# Patient Record
Sex: FEMALE | Race: ASIAN | NOT HISPANIC OR LATINO | URBAN - METROPOLITAN AREA
[De-identification: names, ages, dates, MRNs, and addresses within clinical notes are randomized per-mention and may not be internally consistent; named-entity substitution may affect disease eponyms.]

---

## 2022-01-07 ENCOUNTER — INPATIENT (INPATIENT)
Facility: HOSPITAL | Age: 22
LOS: 2 days | Discharge: HOME | DRG: 637 | End: 2022-01-10
Attending: INTERNAL MEDICINE | Admitting: INTERNAL MEDICINE
Payer: COMMERCIAL

## 2022-01-07 VITALS
OXYGEN SATURATION: 100 % | HEART RATE: 114 BPM | DIASTOLIC BLOOD PRESSURE: 81 MMHG | RESPIRATION RATE: 24 BRPM | SYSTOLIC BLOOD PRESSURE: 120 MMHG | WEIGHT: 110.01 LBS

## 2022-01-07 LAB
A1C WITH ESTIMATED AVERAGE GLUCOSE RESULT: >20.1 % — HIGH (ref 4–5.6)
ALBUMIN SERPL ELPH-MCNC: 2.9 G/DL — LOW (ref 3.4–5)
ALBUMIN SERPL ELPH-MCNC: 3.6 G/DL — SIGNIFICANT CHANGE UP (ref 3.3–5)
ALBUMIN SERPL ELPH-MCNC: 3.6 G/DL — SIGNIFICANT CHANGE UP (ref 3.4–5)
ALBUMIN SERPL ELPH-MCNC: 3.8 G/DL — SIGNIFICANT CHANGE UP (ref 3.3–5)
ALP SERPL-CCNC: 289 U/L — HIGH (ref 40–120)
ALP SERPL-CCNC: 418 U/L — HIGH (ref 40–120)
ALP SERPL-CCNC: 444 U/L — HIGH (ref 40–120)
ALP SERPL-CCNC: 487 U/L — HIGH (ref 40–120)
ALT FLD-CCNC: 24 U/L — SIGNIFICANT CHANGE UP (ref 10–45)
ALT FLD-CCNC: 25 U/L — SIGNIFICANT CHANGE UP (ref 12–42)
ALT FLD-CCNC: <5 U/L — LOW (ref 10–45)
ALT FLD-CCNC: SIGNIFICANT CHANGE UP U/L (ref 12–42)
AMPHET UR-MCNC: NEGATIVE — SIGNIFICANT CHANGE UP
ANION GAP SERPL CALC-SCNC: 21 MMOL/L — HIGH (ref 5–17)
ANION GAP SERPL CALC-SCNC: 23 MMOL/L — HIGH (ref 5–17)
ANION GAP SERPL CALC-SCNC: 26 MMOL/L — HIGH (ref 5–17)
ANION GAP SERPL CALC-SCNC: 29 MMOL/L — HIGH (ref 5–17)
ANION GAP SERPL CALC-SCNC: >21 MMOL/L — HIGH (ref 5–17)
ANION GAP SERPL CALC-SCNC: >24 MMOL/L — HIGH (ref 9–16)
ANION GAP SERPL CALC-SCNC: >26 MMOL/L — HIGH (ref 9–16)
ANISOCYTOSIS BLD QL: SIGNIFICANT CHANGE UP
APPEARANCE UR: CLEAR — SIGNIFICANT CHANGE UP
APTT BLD: 29.9 SEC — SIGNIFICANT CHANGE UP (ref 27.5–35.5)
APTT BLD: 33.1 SEC — SIGNIFICANT CHANGE UP (ref 27.5–35.5)
AST SERPL-CCNC: 29 U/L — SIGNIFICANT CHANGE UP (ref 15–37)
AST SERPL-CCNC: 36 U/L — SIGNIFICANT CHANGE UP (ref 10–40)
AST SERPL-CCNC: <5 U/L — LOW (ref 10–40)
AST SERPL-CCNC: SIGNIFICANT CHANGE UP U/L (ref 15–37)
B-OH-BUTYR SERPL-SCNC: >8 MMOL/L — SIGNIFICANT CHANGE UP
BACTERIA # UR AUTO: PRESENT /HPF
BARBITURATES UR SCN-MCNC: NEGATIVE — SIGNIFICANT CHANGE UP
BASE EXCESS BLDA CALC-SCNC: -24 MMOL/L — LOW (ref -2–3)
BASE EXCESS BLDA CALC-SCNC: -30.8 MMOL/L — LOW (ref -2–3)
BASOPHILS # BLD AUTO: 0.07 K/UL — SIGNIFICANT CHANGE UP (ref 0–0.2)
BASOPHILS # BLD AUTO: 0.12 K/UL — SIGNIFICANT CHANGE UP (ref 0–0.2)
BASOPHILS NFR BLD AUTO: 0.6 % — SIGNIFICANT CHANGE UP (ref 0–2)
BASOPHILS NFR BLD AUTO: 1 % — SIGNIFICANT CHANGE UP (ref 0–2)
BENZODIAZ UR-MCNC: NEGATIVE — SIGNIFICANT CHANGE UP
BILIRUB SERPL-MCNC: 0.2 MG/DL — SIGNIFICANT CHANGE UP (ref 0.2–1.2)
BILIRUB SERPL-MCNC: 0.3 MG/DL — SIGNIFICANT CHANGE UP (ref 0.2–1.2)
BILIRUB SERPL-MCNC: 0.4 MG/DL — SIGNIFICANT CHANGE UP (ref 0.2–1.2)
BILIRUB SERPL-MCNC: 0.5 MG/DL — SIGNIFICANT CHANGE UP (ref 0.2–1.2)
BILIRUB UR-MCNC: NEGATIVE — SIGNIFICANT CHANGE UP
BLD GP AB SCN SERPL QL: NEGATIVE — SIGNIFICANT CHANGE UP
BUN SERPL-MCNC: 11 MG/DL — SIGNIFICANT CHANGE UP (ref 7–23)
BUN SERPL-MCNC: 14 MG/DL — SIGNIFICANT CHANGE UP (ref 7–23)
BUN SERPL-MCNC: 15 MG/DL — SIGNIFICANT CHANGE UP (ref 7–23)
BUN SERPL-MCNC: 18 MG/DL — SIGNIFICANT CHANGE UP (ref 7–23)
BUN SERPL-MCNC: 19 MG/DL — SIGNIFICANT CHANGE UP (ref 7–23)
CALCIUM SERPL-MCNC: 5.9 MG/DL — CRITICAL LOW (ref 8.4–10.5)
CALCIUM SERPL-MCNC: 6 MG/DL — CRITICAL LOW (ref 8.4–10.5)
CALCIUM SERPL-MCNC: 6.3 MG/DL — CRITICAL LOW (ref 8.5–10.5)
CALCIUM SERPL-MCNC: 6.5 MG/DL — CRITICAL LOW (ref 8.4–10.5)
CALCIUM SERPL-MCNC: 7.3 MG/DL — LOW (ref 8.5–10.5)
CALCIUM SERPL-MCNC: 7.6 MG/DL — LOW (ref 8.4–10.5)
CALCIUM SERPL-MCNC: 7.7 MG/DL — LOW (ref 8.4–10.5)
CHLORIDE SERPL-SCNC: 110 MMOL/L — HIGH (ref 96–108)
CHLORIDE SERPL-SCNC: 113 MMOL/L — HIGH (ref 96–108)
CHLORIDE SERPL-SCNC: 115 MMOL/L — HIGH (ref 96–108)
CHLORIDE SERPL-SCNC: 117 MMOL/L — HIGH (ref 96–108)
CHLORIDE SERPL-SCNC: 99 MMOL/L — SIGNIFICANT CHANGE UP (ref 96–108)
CHOLEST SERPL-MCNC: 346 MG/DL — HIGH
CO2 BLDA-SCNC: 3 MMOL/L — LOW (ref 19–24)
CO2 BLDA-SCNC: 4 MMOL/L — LOW (ref 19–24)
CO2 SERPL-SCNC: 3 MMOL/L — CRITICAL LOW (ref 22–31)
CO2 SERPL-SCNC: 3 MMOL/L — CRITICAL LOW (ref 22–31)
CO2 SERPL-SCNC: 4 MMOL/L — CRITICAL LOW (ref 22–31)
CO2 SERPL-SCNC: 8 MMOL/L — CRITICAL LOW (ref 22–31)
CO2 SERPL-SCNC: <2 MMOL/L — CRITICAL LOW (ref 22–31)
CO2 SERPL-SCNC: <6 MMOL/L — CRITICAL LOW (ref 22–31)
CO2 SERPL-SCNC: <6 MMOL/L — CRITICAL LOW (ref 22–31)
COCAINE METAB.OTHER UR-MCNC: NEGATIVE — SIGNIFICANT CHANGE UP
COLOR SPEC: YELLOW — SIGNIFICANT CHANGE UP
CORTIS AM PEAK SERPL-MCNC: 37.17 UG/DL — HIGH (ref 6.02–18.4)
CREAT SERPL-MCNC: 0.48 MG/DL — LOW (ref 0.5–1.3)
CREAT SERPL-MCNC: 0.57 MG/DL — SIGNIFICANT CHANGE UP (ref 0.5–1.3)
CREAT SERPL-MCNC: 0.59 MG/DL — SIGNIFICANT CHANGE UP (ref 0.5–1.3)
CREAT SERPL-MCNC: 0.67 MG/DL — SIGNIFICANT CHANGE UP (ref 0.5–1.3)
CREAT SERPL-MCNC: 0.77 MG/DL — SIGNIFICANT CHANGE UP (ref 0.5–1.3)
CREAT SERPL-MCNC: 0.92 MG/DL — SIGNIFICANT CHANGE UP (ref 0.5–1.3)
CREAT SERPL-MCNC: 1.12 MG/DL — SIGNIFICANT CHANGE UP (ref 0.5–1.3)
DACRYOCYTES BLD QL SMEAR: SIGNIFICANT CHANGE UP
DIFF PNL FLD: ABNORMAL
EOSINOPHIL # BLD AUTO: 0 K/UL — SIGNIFICANT CHANGE UP (ref 0–0.5)
EOSINOPHIL # BLD AUTO: 0.12 K/UL — SIGNIFICANT CHANGE UP (ref 0–0.5)
EOSINOPHIL NFR BLD AUTO: 0 % — SIGNIFICANT CHANGE UP (ref 0–6)
EOSINOPHIL NFR BLD AUTO: 1 % — SIGNIFICANT CHANGE UP (ref 0–6)
EPI CELLS # UR: SIGNIFICANT CHANGE UP /HPF (ref 0–5)
ESTIMATED AVERAGE GLUCOSE: >441 MG/DL — HIGH (ref 68–114)
ETHANOL SERPL-MCNC: <3 MG/DL — SIGNIFICANT CHANGE UP
GAS PNL BLDA: SIGNIFICANT CHANGE UP
GLUCOSE BLDC GLUCOMTR-MCNC: 221 MG/DL — HIGH (ref 70–99)
GLUCOSE BLDC GLUCOMTR-MCNC: 267 MG/DL — HIGH (ref 70–99)
GLUCOSE BLDC GLUCOMTR-MCNC: 297 MG/DL — HIGH (ref 70–99)
GLUCOSE BLDC GLUCOMTR-MCNC: 357 MG/DL — HIGH (ref 70–99)
GLUCOSE BLDC GLUCOMTR-MCNC: 362 MG/DL — HIGH (ref 70–99)
GLUCOSE BLDC GLUCOMTR-MCNC: 377 MG/DL — HIGH (ref 70–99)
GLUCOSE BLDC GLUCOMTR-MCNC: 377 MG/DL — HIGH (ref 70–99)
GLUCOSE BLDC GLUCOMTR-MCNC: 409 MG/DL — HIGH (ref 70–99)
GLUCOSE BLDC GLUCOMTR-MCNC: 412 MG/DL — HIGH (ref 70–99)
GLUCOSE BLDC GLUCOMTR-MCNC: 478 MG/DL — CRITICAL HIGH (ref 70–99)
GLUCOSE BLDC GLUCOMTR-MCNC: 492 MG/DL — CRITICAL HIGH (ref 70–99)
GLUCOSE BLDC GLUCOMTR-MCNC: >600 MG/DL — CRITICAL HIGH (ref 70–99)
GLUCOSE SERPL-MCNC: 398 MG/DL — HIGH (ref 70–99)
GLUCOSE SERPL-MCNC: 451 MG/DL — CRITICAL HIGH (ref 70–99)
GLUCOSE SERPL-MCNC: 479 MG/DL — CRITICAL HIGH (ref 70–99)
GLUCOSE SERPL-MCNC: 512 MG/DL — CRITICAL HIGH (ref 70–99)
GLUCOSE SERPL-MCNC: 513 MG/DL — CRITICAL HIGH (ref 70–99)
GLUCOSE SERPL-MCNC: 535 MG/DL — CRITICAL HIGH (ref 70–99)
GLUCOSE SERPL-MCNC: 733 MG/DL — CRITICAL HIGH (ref 70–99)
GLUCOSE UR QL: >=1000
GRAN CASTS # UR COMP ASSIST: ABNORMAL /LPF
HCG SERPL-ACNC: <1 MIU/ML — SIGNIFICANT CHANGE UP
HCO3 BLDA-SCNC: 2 MMOL/L — CRITICAL LOW (ref 21–28)
HCO3 BLDA-SCNC: 4 MMOL/L — CRITICAL LOW (ref 21–28)
HCT VFR BLD CALC: 42.3 % — SIGNIFICANT CHANGE UP (ref 34.5–45)
HCT VFR BLD CALC: 45.8 % — HIGH (ref 34.5–45)
HDLC SERPL-MCNC: 43 MG/DL — LOW
HGB BLD-MCNC: 13.9 G/DL — SIGNIFICANT CHANGE UP (ref 11.5–15.5)
HGB BLD-MCNC: 14.5 G/DL — SIGNIFICANT CHANGE UP (ref 11.5–15.5)
IMM GRANULOCYTES NFR BLD AUTO: 2.8 % — HIGH (ref 0–1.5)
INR BLD: 0.87 — LOW (ref 0.88–1.16)
INR BLD: 0.91 — SIGNIFICANT CHANGE UP (ref 0.88–1.16)
KETONES UR-MCNC: >=80 MG/DL
LACTATE SERPL-SCNC: 0.5 MMOL/L — SIGNIFICANT CHANGE UP (ref 0.4–2)
LACTATE SERPL-SCNC: 1.1 MMOL/L — SIGNIFICANT CHANGE UP (ref 0.5–2)
LDLC SERPL DIRECT ASSAY-MCNC: 159 MG/DL — HIGH
LEUKOCYTE ESTERASE UR-ACNC: NEGATIVE — SIGNIFICANT CHANGE UP
LIDOCAIN IGE QN: 102 U/L — HIGH (ref 7–60)
LIDOCAIN IGE QN: 220 U/L — SIGNIFICANT CHANGE UP (ref 73–393)
LIPID PNL WITH DIRECT LDL SERPL: SIGNIFICANT CHANGE UP MG/DL
LYMPHOCYTES # BLD AUTO: 1.5 K/UL — SIGNIFICANT CHANGE UP (ref 1–3.3)
LYMPHOCYTES # BLD AUTO: 12.9 % — LOW (ref 13–44)
LYMPHOCYTES # BLD AUTO: 18 % — SIGNIFICANT CHANGE UP (ref 13–44)
LYMPHOCYTES # BLD AUTO: 2.15 K/UL — SIGNIFICANT CHANGE UP (ref 1–3.3)
MACROCYTES BLD QL: SLIGHT — SIGNIFICANT CHANGE UP
MAGNESIUM SERPL-MCNC: 1.9 MG/DL — SIGNIFICANT CHANGE UP (ref 1.6–2.6)
MAGNESIUM SERPL-MCNC: 2.4 MG/DL — SIGNIFICANT CHANGE UP (ref 1.6–2.6)
MAGNESIUM SERPL-MCNC: 2.5 MG/DL — SIGNIFICANT CHANGE UP (ref 1.6–2.6)
MAGNESIUM SERPL-MCNC: 2.6 MG/DL — SIGNIFICANT CHANGE UP (ref 1.6–2.6)
MANUAL SMEAR VERIFICATION: SIGNIFICANT CHANGE UP
MCHC RBC-ENTMCNC: 28.8 PG — SIGNIFICANT CHANGE UP (ref 27–34)
MCHC RBC-ENTMCNC: 29.2 PG — SIGNIFICANT CHANGE UP (ref 27–34)
MCHC RBC-ENTMCNC: 31.7 GM/DL — LOW (ref 32–36)
MCHC RBC-ENTMCNC: 32.9 GM/DL — SIGNIFICANT CHANGE UP (ref 32–36)
MCV RBC AUTO: 87.6 FL — SIGNIFICANT CHANGE UP (ref 80–100)
MCV RBC AUTO: 92.2 FL — SIGNIFICANT CHANGE UP (ref 80–100)
METAMYELOCYTES # FLD: 3 % — HIGH (ref 0–0)
METHADONE UR-MCNC: NEGATIVE — SIGNIFICANT CHANGE UP
MONOCYTES # BLD AUTO: 0.6 K/UL — SIGNIFICANT CHANGE UP (ref 0–0.9)
MONOCYTES # BLD AUTO: 1.63 K/UL — HIGH (ref 0–0.9)
MONOCYTES NFR BLD AUTO: 14 % — SIGNIFICANT CHANGE UP (ref 2–14)
MONOCYTES NFR BLD AUTO: 5 % — SIGNIFICANT CHANGE UP (ref 2–14)
MYELOCYTES NFR BLD: 5 % — HIGH (ref 0–0)
NEUTROPHILS # BLD AUTO: 7.76 K/UL — HIGH (ref 1.8–7.4)
NEUTROPHILS # BLD AUTO: 8.08 K/UL — HIGH (ref 1.8–7.4)
NEUTROPHILS NFR BLD AUTO: 56 % — SIGNIFICANT CHANGE UP (ref 43–77)
NEUTROPHILS NFR BLD AUTO: 69.7 % — SIGNIFICANT CHANGE UP (ref 43–77)
NEUTS BAND # BLD: 9 % — HIGH (ref 0–8)
NITRITE UR-MCNC: NEGATIVE — SIGNIFICANT CHANGE UP
NON HDL CHOLESTEROL: 303 MG/DL — HIGH
NRBC # BLD: 0 /100 WBCS — SIGNIFICANT CHANGE UP (ref 0–0)
NRBC # BLD: 0 /100 — SIGNIFICANT CHANGE UP (ref 0–0)
NRBC # BLD: SIGNIFICANT CHANGE UP /100 WBCS (ref 0–0)
OPIATES UR-MCNC: NEGATIVE — SIGNIFICANT CHANGE UP
PCO2 BLDA: <18 MMHG — LOW (ref 32–35)
PCO2 BLDA: <18 MMHG — LOW (ref 32–35)
PCO2 BLDV: 25 MMHG — LOW (ref 39–42)
PCO2 BLDV: <19 MMHG — LOW (ref 39–42)
PCP SPEC-MCNC: SIGNIFICANT CHANGE UP
PCP UR-MCNC: NEGATIVE — SIGNIFICANT CHANGE UP
PH BLDA: 6.83 — CRITICAL LOW (ref 7.35–7.45)
PH BLDA: 7.09 — CRITICAL LOW (ref 7.35–7.45)
PH BLDV: <6.93 — LOW (ref 7.32–7.43)
PH BLDV: <6.93 — LOW (ref 7.32–7.43)
PH UR: 5.5 — SIGNIFICANT CHANGE UP (ref 5–8)
PHOSPHATE SERPL-MCNC: 0.5 MG/DL — CRITICAL LOW (ref 2.5–4.5)
PHOSPHATE SERPL-MCNC: 1.6 MG/DL — LOW (ref 2.5–4.5)
PHOSPHATE SERPL-MCNC: 2.7 MG/DL — SIGNIFICANT CHANGE UP (ref 2.5–4.5)
PHOSPHATE SERPL-MCNC: 2.8 MG/DL — SIGNIFICANT CHANGE UP (ref 2.5–4.5)
PLAT MORPH BLD: NORMAL — SIGNIFICANT CHANGE UP
PLATELET # BLD AUTO: 257 K/UL — SIGNIFICANT CHANGE UP (ref 150–400)
PLATELET # BLD AUTO: 304 K/UL — SIGNIFICANT CHANGE UP (ref 150–400)
PO2 BLDA: 175 MMHG — HIGH (ref 83–108)
PO2 BLDA: 197 MMHG — HIGH (ref 83–108)
PO2 BLDV: 48 MMHG — HIGH (ref 25–45)
PO2 BLDV: 74 MMHG — HIGH (ref 25–45)
POIKILOCYTOSIS BLD QL AUTO: SIGNIFICANT CHANGE UP
POLYCHROMASIA BLD QL SMEAR: SLIGHT — SIGNIFICANT CHANGE UP
POTASSIUM SERPL-MCNC: 2.7 MMOL/L — CRITICAL LOW (ref 3.5–5.3)
POTASSIUM SERPL-MCNC: 3.1 MMOL/L — LOW (ref 3.5–5.3)
POTASSIUM SERPL-MCNC: 3.4 MMOL/L — LOW (ref 3.5–5.3)
POTASSIUM SERPL-MCNC: 3.4 MMOL/L — LOW (ref 3.5–5.3)
POTASSIUM SERPL-MCNC: 4 MMOL/L — SIGNIFICANT CHANGE UP (ref 3.5–5.3)
POTASSIUM SERPL-MCNC: SIGNIFICANT CHANGE UP (ref 3.5–5.3)
POTASSIUM SERPL-MCNC: SIGNIFICANT CHANGE UP MMOL/L (ref 3.5–5.3)
POTASSIUM SERPL-SCNC: 2.7 MMOL/L — CRITICAL LOW (ref 3.5–5.3)
POTASSIUM SERPL-SCNC: 3.1 MMOL/L — LOW (ref 3.5–5.3)
POTASSIUM SERPL-SCNC: 3.4 MMOL/L — LOW (ref 3.5–5.3)
POTASSIUM SERPL-SCNC: 3.4 MMOL/L — LOW (ref 3.5–5.3)
POTASSIUM SERPL-SCNC: 4 MMOL/L — SIGNIFICANT CHANGE UP (ref 3.5–5.3)
POTASSIUM SERPL-SCNC: SIGNIFICANT CHANGE UP (ref 3.5–5.3)
POTASSIUM SERPL-SCNC: SIGNIFICANT CHANGE UP MMOL/L (ref 3.5–5.3)
PROMYELOCYTES # FLD: 2 % — HIGH (ref 0–0)
PROT SERPL-MCNC: 5.3 G/DL — LOW (ref 6.4–8.2)
PROT SERPL-MCNC: 5.5 G/DL — LOW (ref 6–8.3)
PROT SERPL-MCNC: 6 G/DL — SIGNIFICANT CHANGE UP (ref 6–8.3)
PROT SERPL-MCNC: 6.6 G/DL — SIGNIFICANT CHANGE UP (ref 6.4–8.2)
PROT UR-MCNC: 30 MG/DL
PROTHROM AB SERPL-ACNC: 10.5 SEC — LOW (ref 10.6–13.6)
PROTHROM AB SERPL-ACNC: 10.8 SEC — SIGNIFICANT CHANGE UP (ref 10.6–13.6)
RBC # BLD: 4.83 M/UL — SIGNIFICANT CHANGE UP (ref 3.8–5.2)
RBC # BLD: 4.97 M/UL — SIGNIFICANT CHANGE UP (ref 3.8–5.2)
RBC # FLD: 13.8 % — SIGNIFICANT CHANGE UP (ref 10.3–14.5)
RBC # FLD: 14.1 % — SIGNIFICANT CHANGE UP (ref 10.3–14.5)
RBC BLD AUTO: ABNORMAL
RBC CASTS # UR COMP ASSIST: < 5 /HPF — SIGNIFICANT CHANGE UP
RH IG SCN BLD-IMP: POSITIVE — SIGNIFICANT CHANGE UP
SAO2 % BLDA: 100 % — HIGH (ref 94–98)
SAO2 % BLDA: 99.6 % — HIGH (ref 94–98)
SAO2 % BLDV: 82.3 % — SIGNIFICANT CHANGE UP (ref 67–88)
SAO2 % BLDV: 94.7 % — HIGH (ref 67–88)
SARS-COV-2 RNA SPEC QL NAA+PROBE: SIGNIFICANT CHANGE UP
SODIUM SERPL-SCNC: 131 MMOL/L — LOW (ref 132–145)
SODIUM SERPL-SCNC: 136 MMOL/L — SIGNIFICANT CHANGE UP (ref 135–145)
SODIUM SERPL-SCNC: 140 MMOL/L — SIGNIFICANT CHANGE UP (ref 132–145)
SODIUM SERPL-SCNC: 142 MMOL/L — SIGNIFICANT CHANGE UP (ref 135–145)
SODIUM SERPL-SCNC: 144 MMOL/L — SIGNIFICANT CHANGE UP (ref 135–145)
SODIUM SERPL-SCNC: 146 MMOL/L — HIGH (ref 135–145)
SODIUM SERPL-SCNC: 147 MMOL/L — HIGH (ref 135–145)
SP GR SPEC: 1.02 — SIGNIFICANT CHANGE UP (ref 1–1.03)
SPHEROCYTES BLD QL SMEAR: SLIGHT — SIGNIFICANT CHANGE UP
THC UR QL: NEGATIVE — SIGNIFICANT CHANGE UP
TRIGL SERPL-MCNC: 1298 MG/DL — HIGH
TROPONIN I, HIGH SENSITIVITY RESULT: 9.9 NG/L — SIGNIFICANT CHANGE UP
TSH SERPL-MCNC: 2.07 UIU/ML — SIGNIFICANT CHANGE UP (ref 0.36–3.74)
UROBILINOGEN FLD QL: 0.2 E.U./DL — SIGNIFICANT CHANGE UP
WBC # BLD: 11.61 K/UL — HIGH (ref 3.8–10.5)
WBC # BLD: 11.94 K/UL — HIGH (ref 3.8–10.5)
WBC # FLD AUTO: 11.61 K/UL — HIGH (ref 3.8–10.5)
WBC # FLD AUTO: 11.94 K/UL — HIGH (ref 3.8–10.5)
WBC UR QL: < 5 /HPF — SIGNIFICANT CHANGE UP

## 2022-01-07 PROCEDURE — 71045 X-RAY EXAM CHEST 1 VIEW: CPT | Mod: 26

## 2022-01-07 PROCEDURE — 99222 1ST HOSP IP/OBS MODERATE 55: CPT | Mod: GC,25

## 2022-01-07 PROCEDURE — 36620 INSERTION CATHETER ARTERY: CPT | Mod: GC

## 2022-01-07 PROCEDURE — 70450 CT HEAD/BRAIN W/O DYE: CPT | Mod: 26

## 2022-01-07 PROCEDURE — 93010 ELECTROCARDIOGRAM REPORT: CPT

## 2022-01-07 PROCEDURE — 71045 X-RAY EXAM CHEST 1 VIEW: CPT | Mod: 26,59

## 2022-01-07 PROCEDURE — 99291 CRITICAL CARE FIRST HOUR: CPT | Mod: 25

## 2022-01-07 RX ORDER — POTASSIUM CHLORIDE 20 MEQ
40 PACKET (EA) ORAL EVERY 4 HOURS
Refills: 0 | Status: COMPLETED | OUTPATIENT
Start: 2022-01-07 | End: 2022-01-07

## 2022-01-07 RX ORDER — POTASSIUM CHLORIDE 20 MEQ
40 PACKET (EA) ORAL ONCE
Refills: 0 | Status: COMPLETED | OUTPATIENT
Start: 2022-01-07 | End: 2022-01-07

## 2022-01-07 RX ORDER — PIPERACILLIN AND TAZOBACTAM 4; .5 G/20ML; G/20ML
4.5 INJECTION, POWDER, LYOPHILIZED, FOR SOLUTION INTRAVENOUS EVERY 6 HOURS
Refills: 0 | Status: DISCONTINUED | OUTPATIENT
Start: 2022-01-07 | End: 2022-01-08

## 2022-01-07 RX ORDER — MAGNESIUM SULFATE 500 MG/ML
1 VIAL (ML) INJECTION ONCE
Refills: 0 | Status: COMPLETED | OUTPATIENT
Start: 2022-01-07 | End: 2022-01-07

## 2022-01-07 RX ORDER — POTASSIUM CHLORIDE 20 MEQ
10 PACKET (EA) ORAL
Refills: 0 | Status: COMPLETED | OUTPATIENT
Start: 2022-01-07 | End: 2022-01-07

## 2022-01-07 RX ORDER — DEXTROSE 50 % IN WATER 50 %
50 SYRINGE (ML) INTRAVENOUS
Refills: 0 | Status: DISCONTINUED | OUTPATIENT
Start: 2022-01-07 | End: 2022-01-10

## 2022-01-07 RX ORDER — SODIUM CHLORIDE 9 MG/ML
1000 INJECTION, SOLUTION INTRAVENOUS ONCE
Refills: 0 | Status: COMPLETED | OUTPATIENT
Start: 2022-01-07 | End: 2022-01-07

## 2022-01-07 RX ORDER — POTASSIUM CHLORIDE 20 MEQ
20 PACKET (EA) ORAL ONCE
Refills: 0 | Status: COMPLETED | OUTPATIENT
Start: 2022-01-07 | End: 2022-01-07

## 2022-01-07 RX ORDER — INSULIN HUMAN 100 [IU]/ML
10 INJECTION, SOLUTION SUBCUTANEOUS ONCE
Refills: 0 | Status: DISCONTINUED | OUTPATIENT
Start: 2022-01-07 | End: 2022-01-07

## 2022-01-07 RX ORDER — INSULIN HUMAN 100 [IU]/ML
4.5 INJECTION, SOLUTION SUBCUTANEOUS
Qty: 100 | Refills: 0 | Status: DISCONTINUED | OUTPATIENT
Start: 2022-01-07 | End: 2022-01-07

## 2022-01-07 RX ORDER — CHLORHEXIDINE GLUCONATE 213 G/1000ML
1 SOLUTION TOPICAL
Refills: 0 | Status: DISCONTINUED | OUTPATIENT
Start: 2022-01-07 | End: 2022-01-10

## 2022-01-07 RX ORDER — POTASSIUM PHOSPHATE, MONOBASIC POTASSIUM PHOSPHATE, DIBASIC 236; 224 MG/ML; MG/ML
15 INJECTION, SOLUTION INTRAVENOUS ONCE
Refills: 0 | Status: COMPLETED | OUTPATIENT
Start: 2022-01-07 | End: 2022-01-07

## 2022-01-07 RX ORDER — PIPERACILLIN AND TAZOBACTAM 4; .5 G/20ML; G/20ML
3.38 INJECTION, POWDER, LYOPHILIZED, FOR SOLUTION INTRAVENOUS ONCE
Refills: 0 | Status: DISCONTINUED | OUTPATIENT
Start: 2022-01-07 | End: 2022-01-07

## 2022-01-07 RX ORDER — INSULIN HUMAN 100 [IU]/ML
4 INJECTION, SOLUTION SUBCUTANEOUS ONCE
Refills: 0 | Status: DISCONTINUED | OUTPATIENT
Start: 2022-01-07 | End: 2022-01-07

## 2022-01-07 RX ORDER — HYDROCORTISONE 20 MG
100 TABLET ORAL EVERY 8 HOURS
Refills: 0 | Status: DISCONTINUED | OUTPATIENT
Start: 2022-01-07 | End: 2022-01-07

## 2022-01-07 RX ORDER — SODIUM BICARBONATE 1 MEQ/ML
50 SYRINGE (ML) INTRAVENOUS ONCE
Refills: 0 | Status: COMPLETED | OUTPATIENT
Start: 2022-01-07 | End: 2022-01-07

## 2022-01-07 RX ORDER — ENOXAPARIN SODIUM 100 MG/ML
40 INJECTION SUBCUTANEOUS EVERY 24 HOURS
Refills: 0 | Status: DISCONTINUED | OUTPATIENT
Start: 2022-01-07 | End: 2022-01-08

## 2022-01-07 RX ORDER — ONDANSETRON 8 MG/1
4 TABLET, FILM COATED ORAL ONCE
Refills: 0 | Status: COMPLETED | OUTPATIENT
Start: 2022-01-07 | End: 2022-01-07

## 2022-01-07 RX ORDER — INSULIN HUMAN 100 [IU]/ML
4 INJECTION, SOLUTION SUBCUTANEOUS
Qty: 100 | Refills: 0 | Status: DISCONTINUED | OUTPATIENT
Start: 2022-01-07 | End: 2022-01-08

## 2022-01-07 RX ORDER — SODIUM CHLORIDE 9 MG/ML
1000 INJECTION, SOLUTION INTRAVENOUS ONCE
Refills: 0 | Status: DISCONTINUED | OUTPATIENT
Start: 2022-01-07 | End: 2022-01-07

## 2022-01-07 RX ORDER — SODIUM CHLORIDE 9 MG/ML
1000 INJECTION, SOLUTION INTRAVENOUS
Refills: 0 | Status: DISCONTINUED | OUTPATIENT
Start: 2022-01-07 | End: 2022-01-07

## 2022-01-07 RX ORDER — SODIUM CHLORIDE 9 MG/ML
1000 INJECTION INTRAMUSCULAR; INTRAVENOUS; SUBCUTANEOUS ONCE
Refills: 0 | Status: COMPLETED | OUTPATIENT
Start: 2022-01-07 | End: 2022-01-07

## 2022-01-07 RX ORDER — MAGNESIUM SULFATE 500 MG/ML
2 VIAL (ML) INJECTION ONCE
Refills: 0 | Status: COMPLETED | OUTPATIENT
Start: 2022-01-07 | End: 2022-01-07

## 2022-01-07 RX ORDER — VANCOMYCIN HCL 1 G
750 VIAL (EA) INTRAVENOUS ONCE
Refills: 0 | Status: COMPLETED | OUTPATIENT
Start: 2022-01-07 | End: 2022-01-07

## 2022-01-07 RX ORDER — CALCIUM GLUCONATE 100 MG/ML
2 VIAL (ML) INTRAVENOUS ONCE
Refills: 0 | Status: COMPLETED | OUTPATIENT
Start: 2022-01-07 | End: 2022-01-07

## 2022-01-07 RX ORDER — SODIUM BICARBONATE 1 MEQ/ML
0.83 SYRINGE (ML) INTRAVENOUS
Qty: 150 | Refills: 0 | Status: DISCONTINUED | OUTPATIENT
Start: 2022-01-07 | End: 2022-01-07

## 2022-01-07 RX ORDER — INSULIN HUMAN 100 [IU]/ML
4 INJECTION, SOLUTION SUBCUTANEOUS
Qty: 100 | Refills: 0 | Status: DISCONTINUED | OUTPATIENT
Start: 2022-01-07 | End: 2022-01-07

## 2022-01-07 RX ORDER — POTASSIUM CHLORIDE 20 MEQ
40 PACKET (EA) ORAL EVERY 4 HOURS
Refills: 0 | Status: DISCONTINUED | OUTPATIENT
Start: 2022-01-07 | End: 2022-01-07

## 2022-01-07 RX ORDER — INSULIN HUMAN 100 [IU]/ML
4.5 INJECTION, SOLUTION SUBCUTANEOUS
Qty: 250 | Refills: 0 | Status: DISCONTINUED | OUTPATIENT
Start: 2022-01-07 | End: 2022-01-07

## 2022-01-07 RX ORDER — SODIUM BICARBONATE 1 MEQ/ML
50 SYRINGE (ML) INTRAVENOUS
Refills: 0 | Status: COMPLETED | OUTPATIENT
Start: 2022-01-07 | End: 2022-01-07

## 2022-01-07 RX ORDER — SODIUM CHLORIDE 9 MG/ML
1000 INJECTION, SOLUTION INTRAVENOUS
Refills: 0 | Status: DISCONTINUED | OUTPATIENT
Start: 2022-01-07 | End: 2022-01-08

## 2022-01-07 RX ORDER — POTASSIUM PHOSPHATE, MONOBASIC POTASSIUM PHOSPHATE, DIBASIC 236; 224 MG/ML; MG/ML
30 INJECTION, SOLUTION INTRAVENOUS ONCE
Refills: 0 | Status: COMPLETED | OUTPATIENT
Start: 2022-01-07 | End: 2022-01-07

## 2022-01-07 RX ADMIN — Medication 100 MILLIEQUIVALENT(S): at 10:23

## 2022-01-07 RX ADMIN — Medication 25 GRAM(S): at 08:31

## 2022-01-07 RX ADMIN — Medication 100 MILLIEQUIVALENT(S): at 11:27

## 2022-01-07 RX ADMIN — SODIUM CHLORIDE 1000 MILLILITER(S): 9 INJECTION INTRAMUSCULAR; INTRAVENOUS; SUBCUTANEOUS at 08:00

## 2022-01-07 RX ADMIN — PIPERACILLIN AND TAZOBACTAM 200 GRAM(S): 4; .5 INJECTION, POWDER, LYOPHILIZED, FOR SOLUTION INTRAVENOUS at 08:59

## 2022-01-07 RX ADMIN — Medication 100 MILLIEQUIVALENT(S): at 10:57

## 2022-01-07 RX ADMIN — Medication 50 MILLIEQUIVALENT(S): at 22:22

## 2022-01-07 RX ADMIN — SODIUM CHLORIDE 1000 MILLILITER(S): 9 INJECTION INTRAMUSCULAR; INTRAVENOUS; SUBCUTANEOUS at 08:30

## 2022-01-07 RX ADMIN — Medication 40 MILLIEQUIVALENT(S): at 17:13

## 2022-01-07 RX ADMIN — Medication 100 MILLIEQUIVALENT(S): at 09:05

## 2022-01-07 RX ADMIN — Medication 40 MILLIEQUIVALENT(S): at 13:21

## 2022-01-07 RX ADMIN — POTASSIUM PHOSPHATE, MONOBASIC POTASSIUM PHOSPHATE, DIBASIC 83.33 MILLIMOLE(S): 236; 224 INJECTION, SOLUTION INTRAVENOUS at 23:03

## 2022-01-07 RX ADMIN — Medication 50 MILLIEQUIVALENT(S): at 10:20

## 2022-01-07 RX ADMIN — Medication 50 MILLIEQUIVALENT(S): at 08:59

## 2022-01-07 RX ADMIN — PIPERACILLIN AND TAZOBACTAM 200 GRAM(S): 4; .5 INJECTION, POWDER, LYOPHILIZED, FOR SOLUTION INTRAVENOUS at 13:25

## 2022-01-07 RX ADMIN — INSULIN HUMAN 4 UNIT(S)/HR: 100 INJECTION, SOLUTION SUBCUTANEOUS at 15:00

## 2022-01-07 RX ADMIN — Medication 100 MILLIGRAM(S): at 15:30

## 2022-01-07 RX ADMIN — Medication 250 MILLIGRAM(S): at 13:20

## 2022-01-07 RX ADMIN — Medication 40 MILLIEQUIVALENT(S): at 15:40

## 2022-01-07 RX ADMIN — SODIUM CHLORIDE 250 MILLILITER(S): 9 INJECTION, SOLUTION INTRAVENOUS at 14:13

## 2022-01-07 RX ADMIN — Medication 50 MILLIEQUIVALENT(S): at 10:22

## 2022-01-07 RX ADMIN — SODIUM CHLORIDE 200 MILLILITER(S): 9 INJECTION, SOLUTION INTRAVENOUS at 21:37

## 2022-01-07 RX ADMIN — INSULIN HUMAN 4 UNIT(S)/HR: 100 INJECTION, SOLUTION SUBCUTANEOUS at 21:00

## 2022-01-07 RX ADMIN — SODIUM CHLORIDE 1000 MILLILITER(S): 9 INJECTION, SOLUTION INTRAVENOUS at 17:27

## 2022-01-07 RX ADMIN — CHLORHEXIDINE GLUCONATE 1 APPLICATION(S): 213 SOLUTION TOPICAL at 20:01

## 2022-01-07 RX ADMIN — SODIUM CHLORIDE 1000 MILLILITER(S): 9 INJECTION INTRAMUSCULAR; INTRAVENOUS; SUBCUTANEOUS at 08:27

## 2022-01-07 RX ADMIN — POTASSIUM PHOSPHATE, MONOBASIC POTASSIUM PHOSPHATE, DIBASIC 62.5 MILLIMOLE(S): 236; 224 INJECTION, SOLUTION INTRAVENOUS at 12:30

## 2022-01-07 RX ADMIN — Medication 200 GRAM(S): at 14:30

## 2022-01-07 RX ADMIN — INSULIN HUMAN 4 UNIT(S)/HR: 100 INJECTION, SOLUTION SUBCUTANEOUS at 11:00

## 2022-01-07 RX ADMIN — Medication 100 GRAM(S): at 20:01

## 2022-01-07 RX ADMIN — Medication 100 MILLIGRAM(S): at 10:20

## 2022-01-07 RX ADMIN — ONDANSETRON 4 MILLIGRAM(S): 8 TABLET, FILM COATED ORAL at 08:31

## 2022-01-07 RX ADMIN — ENOXAPARIN SODIUM 40 MILLIGRAM(S): 100 INJECTION SUBCUTANEOUS at 12:42

## 2022-01-07 RX ADMIN — Medication 40 MILLIEQUIVALENT(S): at 23:14

## 2022-01-07 RX ADMIN — PIPERACILLIN AND TAZOBACTAM 200 GRAM(S): 4; .5 INJECTION, POWDER, LYOPHILIZED, FOR SOLUTION INTRAVENOUS at 19:06

## 2022-01-07 RX ADMIN — Medication 100 MILLIEQUIVALENT(S): at 12:42

## 2022-01-07 RX ADMIN — Medication 40 MILLIEQUIVALENT(S): at 21:30

## 2022-01-07 RX ADMIN — INSULIN HUMAN 10 UNIT(S): 100 INJECTION, SOLUTION SUBCUTANEOUS at 08:58

## 2022-01-07 RX ADMIN — Medication 100 MILLIEQUIVALENT(S): at 09:00

## 2022-01-07 RX ADMIN — Medication 250 MEQ/KG/HR: at 21:03

## 2022-01-07 NOTE — CONSULT NOTE ADULT - ATTENDING COMMENTS
Pt seen on rounds this afternoon.  21-yo  woman with apparently negative PMH admitted with new onset type 1 DM/severe DKA.  Has been treated with insulin drip, but still quite acidotic and still with Kussmaul respirations at the time of our visit.  She is also still quite lethargic, and though will awaken to answer questions, is obviously still quite weak and gives very brief answers.  Has had typical hyperglycemic symptoms for the past few weeks, has also lost 5-10 lb, but quite possibly 20 lb over the past few months.  (Seems to report a weight of 105 lb at one point, now 85).  Was started on empiric steroid therapy and received two doses of 100 mg hydrocortisone, but this has now been D/C'd with result of pre-therapy cortisol level found to 37 mcg/dl    She has received 2 amps of calcium gluconate for the hypocalcemia, but may need additional therapy.  In terms of the hypocalcemia:  --need to check a PTH level, as well as both 25 and 1,25-vitamin D levels.  Would be very suspicious that her 25-D level is extremely low.    --Her alk phos is extremely high in the setting of normal transaminases.  Would check a GGT to confirm that the alk phos is of bone origin.  If it is, it is possibly the result of severe secondary hyperparathyroidism.    --If the alk phos is of bone origin, would not wait for the 25-d level to come back before ordering celiac serologies, cassandra given her type I DM    Other considerations:  --Is likely to need LARGE amounts of phosphate repletion--not only the usual deficits for DKA, but intracellular fluxes due to the large amount of insulin and dextrose she is receiving.  If her PTH is significantly elevated, this will also add to her deficits via the hormone's phosphaturic effect.  --Need to follow the ketosis with the BHB level, since I suspect that she will have a significant component of hyperchloremic acidosis from the large amount of saline she received  --Potassium losses are also exacerbated by the mineralocorticoid effect of the two doses of hydrocortisone, (plus the kaliuretic effect of the combination of secondary hyperaldo (volume depletion) and large amounts of saline  --Agree with the bicarb infusion

## 2022-01-07 NOTE — ED ADULT NURSE NOTE - CHIEF COMPLAINT QUOTE
Pt BIBA as notification c/o found unresponsive. As per friend who arrives with pt, pt c/o recent weight loss and anemia. Pt withdraws to painful stimuli. FS in field [id number], no hx of diabetes. 1 ep of vomiting prior to EMS arrival. Given about 200 cc NS PTA, 20g to L AC placed by EMS. ED team at the bedside.

## 2022-01-07 NOTE — ED PROVIDER NOTE - PHYSICAL EXAMINATION
Constitutional:  Somnolent, Kussmaul breathing, minimal response to noxious stimuli  Head:  NC/AT, symmetric, neck supple  ENMT: Airway patent, nasal mucosa clear, mouth with dry mucosa, throat has no vesicles, no oropharyngeal exudates and vulva is midline  Eyes:  Clear bilaterally, pupils equal, round and reactive to light  Cardiac:  Tachy 100-110's, regular rhythm. Heart sounds S1,S2.  No murmurs, rubs or gallops.  No JVD.  Respiratory:  Breath sounds clear and equal bilaterally, Kussmaul respirations  GI:   Abd soft, non-distended  :  No discharge or lesions  MSK:  Spine appears normal, range of motion is not limited, no muscle or joint tenderness  Neuro:  Somnolent, minimal response to noxious stimuli  Skin:  Skin normal color for race, warm, dry and intact.  No evidence of rash  Psych:  Normal mood and affect, no apparent risk to self or others.  Heme:  No adenopathy or splenomegaly.

## 2022-01-07 NOTE — H&P ADULT - NSHPLABSRESULTS_GEN_ALL_CORE
LABS:                        13.9   11.61 )-----------( 257      ( 2022 10:12 )             42.3     01-    147<H>  |  115<H>  |  14  ----------------------------<  513<HH>  See Note   |  3<LL>  |  0.67    Ca    7.7<L>      2022 17:26  Phos  2.7     -  Mg     1.9     -    TPro  5.3<L>  /  Alb  2.9<L>  /  TBili  0.3  /  DBili  x   /  AST  29  /  ALT  25  /  AlkPhos  418<H>      PT/INR - ( 2022 10:12 )   PT: 10.5 sec;   INR: 0.87          PTT - ( 2022 10:12 )  PTT:29.9 sec  Urinalysis Basic - ( 2022 08:28 )    Color: Yellow / Appearance: Clear / S.020 / pH: x  Gluc: x / Ketone: >=80 mg/dL  / Bili: NEGATIVE / Urobili: 0.2 E.U./dL   Blood: x / Protein: 30 mg/dL / Nitrite: NEGATIVE   Leuk Esterase: NEGATIVE / RBC: < 5 /HPF / WBC < 5 /HPF   Sq Epi: x / Non Sq Epi: 0-5 /HPF / Bacteria: Present /HPF      CAPILLARY BLOOD GLUCOSE      POCT Blood Glucose.: 362 mg/dL (2022 19:10)      RADIOLOGY & ADDITIONAL TESTS: Reviewed.

## 2022-01-07 NOTE — H&P ADULT - NSHPPHYSICALEXAM_GEN_ALL_CORE
VITAL SIGNS:  T(F): 97.8 (01-07-22 @ 18:05), Max: 98.5 (01-07-22 @ 17:00)  HR: 127 (01-07-22 @ 19:00) (99 - 135)  BP: 91/55 (01-07-22 @ 19:00) (91/55 - 120/81)  BP(mean): 67 (01-07-22 @ 19:00) (67 - 94)  RR: 16 (01-07-22 @ 19:00) (15 - 26)  SpO2: 100% (01-07-22 @ 19:00) (98% - 100%)    PHYSICAL EXAM:    Constitutional: young adult woman, laying flat in bed; NAD  HEENT: GABO, anicteric sclera, no nasal discharge; uvula midline, no oropharyngeal erythema or exudates; dry MM  Neck: supple, no thyromegaly  Respiratory: CTA B/L; no W/R/R, no retractions  Cardiac: tachy, regular, no M/R/G; no JVD  Gastrointestinal: soft, NT/ND; no rebound or guarding  Genitourinary: jo with light yellow urine  Extremities: WWP, no clubbing or cyanosis; no peripheral edema  Musculoskeletal: NROM x4; no joint swelling, tenderness or erythema  Vascular: 2+ radial, DP pulses B/L  Dermatologic: skin warm, dry and intact; no rashes, wounds, or scars  Lymphatic: no submandibular or cervical LAD  Neurologic: AAOx1; unable to assess further  Psychiatric: delierious

## 2022-01-07 NOTE — H&P ADULT - ASSESSMENT
Patient is a 22yo F w/ no PMH who presented to hospital after being found in kitchen screaming by friend and appeared weak. BIBEMS and patient found to be in DKA and with AMS. Admitted to MICU for management of DKA.    Neuro  #metabolic encephalopathy  - AAOx1  - 2/2 metabolic acidosis in the setting of DKA  - Monitor mental status    Cardiac  #tachycardia  EKG w/ Sinus tachycardia. Likely 2/2 hypovolemia in the setting of DKA, polyuria.  - give fluids as appropriate    Pulm  No acute issues.  CXR wnl.    Endo/Metabolic  #DKA  #T1DM  Presented in DKA. No known hx of diabetes. Likely new diagnosis of T1DM.  - Endocrinology following  - admit to MICU for insulin gtt protocol  - fluid ruscusitate and replenish electrolytes aggressively   - bicarb gtt for acidosis, monitor K  - change to dextrose containing fluid when glu<250 as per protocol  - c/w insulin gtt until AG closed x2  - f/u Diabetes antibodies    #hypokalemia  K low and at risk for further hypokalemia with insulin gtt  - monitor BMP closely    #hypocalcemia  Ca low upon arrival.   - Ca gluconate  - f/u vitD25, PTH lvls    Renal  Polyuria in the setting of DKA. UA without infection, but with ketonuria and glucosuria.  - monitor fluid status, fluid resuscitate as appropriate.    GI  With NG tube.  Monitor BM's.    ID  #r/o infection  Unclear if infection incited DKA.  - start zosyn  - start vanc    Heme  No acute issues.      F: resuscitate generously  E: replenish aggressively  N: NPO    VTE Prophylaxis: Lovenox 40mg q24h  GI: not needed  C: Full Code  D: MICU

## 2022-01-07 NOTE — ED PROVIDER NOTE - PROGRESS NOTE DETAILS
FSBS 735, pH 6.93, pCO2 25, bicarb 3.  No other labs resulted at this time. Pt d/w Dr. Weiss, medical ICU.  Attending who advises to prioritize, IVFs, potassium and magnesium infusions, consider eICU for evaluation.  Pt transfer upgraded to Level 1, lights and siren as per Dr. Weiss's request.  Pt accepted to medical ICU. Patient resuscitated and Tele ICU consult done- Determined patient is now more alert and protecting airway. No need for intubation prior to transport at this time. Patient with electrolyte replenishment and IVF resuscitation. ICU team updated with most recent clinical status advised transport to ICU. Patient now able to answer questions and move extremities. Patient transported emergently to the ICU.  Thomas Holt PA-C

## 2022-01-07 NOTE — ED PROVIDER NOTE - OBJECTIVE STATEMENT
22 y/o F, no PMH, presents to ED via EMS accompanied by friend.   She is from [place].  [facility] staying at friends home.  Her friend confirmed with pts mother that pt has not PMH but has noted recent weight loss.  Today, pt was found screaming in the kitchen at 6:40 am.  Her friend was awoken from sleep and found pt with standing in kitchen with eyes closed.  She quickly started to appear weak and was eased to the floor.  EMS was called within 15 minutes.  Upon EMS arrival, pt had a witnessed episode of n/v while laying supine.  She arrives altered/somnolent, Kussmaul breathing and glucose "high".  Pt unable to cooperative with HPI/ROS.

## 2022-01-07 NOTE — ED PROVIDER NOTE - CLINICAL SUMMARY MEDICAL DECISION MAKING FREE TEXT BOX
22 y/o F presents to ED with AMS, Kussmaul respirations and hyperglycemia ("high").  Pt ill appearing, somnolent, 's, normotensive, normal O2.  IVFs administered.  Will admit to ICU.

## 2022-01-07 NOTE — CONSULT NOTE ADULT - SUBJECTIVE AND OBJECTIVE BOX
HPI: 21yFemale with no PMHx found altered by friend at home.   She is from [place] and is visiting a friend in [facility]. With confirmed history from mother of no PMHx but reported recent weight loss.   Patient was found screaming in the kitchen at 6:40 am today.  Her friend was awoken from sleep and found pt with standing in kitchen with eyes closed.  She quickly started to appear weak and was eased to the floor.  EMS was called within 15 minutes.  Upon EMS arrival, pt had a witnessed episode of n/v while laying supine.  She arrives altered/somnolent, Kussmaul breathing and glucose "high".  Pt unable to cooperative with HPI/ROS.    FSBS 735 on presentation - 10units regular insulin given   pH 6.93, pCO2 25, bicarb 3.   BHB>8    s/p 4L Normal Saline bolus  Na 142  glucose 479  Calcium corrected 6.8 - 2g Calcium gluconate ordered   AG 23  Triglycerides 1298            FH:  DM:  Thyroid:  Autoimmune:  Other:    SH:  Smoking  Etoh:  Recreational Drugs:  Social Life:    Current Meds:  calcium gluconate IVPB 2 Gram(s) IV Intermittent once  chlorhexidine 2% Cloths 1 Application(s) Topical <User Schedule>  dextrose 5% 1000 milliLiter(s) IV Continuous <Continuous>  dextrose 50% Injectable 50 milliLiter(s) IV Push every 15 minutes  enoxaparin Injectable 40 milliGRAM(s) SubCutaneous every 24 hours  hydrocortisone sodium succinate Injectable 100 milliGRAM(s) IV Push every 8 hours  piperacillin/tazobactam IVPB.. 4.5 Gram(s) IV Intermittent every 6 hours  potassium phosphate IVPB 15 milliMole(s) IV Intermittent once      Allergies:  Allergy Status Unknown      ROS:  Denies the following except as indicated.    General: weight loss/weight gain, decreased appetite, fatigue  Eyes: Blurry vision, double vision, visual changes  ENT: Throat pain, changes in voice,   CV: palpitations, SOB, CP, cough  GI: NVD, difficulty swallowing, abdominal pain  : polyuria, dysuria  Endo: abnormal menses, temperature intolerance, decreased libido  MSK: weakness, joint pain  Skin: rash, dryness, diaphoresis  Heme: Easy bruising,bleeding  Neuro: HA, dizziness, lightheadedness, numbness tingling  Psych: Anxiety, Depression    Vital Signs Last 24 Hrs  T(C): 35.6 (2022 09:16), Max: 35.6 (2022 09:16)  T(F): 96.1 (2022 09:16), Max: 96.1 (2022 09:16)  HR: 115 (2022 11:00) (99 - 115)  BP: 104/73 (2022 11:00) (96/69 - 120/81)  BP(mean): 83 (2022 11:00) (78 - 89)  RR: 18 (2022 11:00) (15 - 24)  SpO2: 100% (2022 11:00) (98% - 100%)    Weight (kg): 45.359 ( @ 07:46)      Constitutional: NAD.   HEENT: NCAT, MMM, OP clear, EOMI, , no proptosis or lid retraction  Neck: no thyromegaly or palpable thyroid nodules   Respiratory: lungs CTAB.  Cardiovascular: regular rhythm, normal S1 and S2, no audible murmurs, no peripheral edema  GI: soft, NT/ND, no masses/HSM appreciated.  Neurology: no tremors, DTR 2+  Skin: no visible rashes/lesions  Psychiatric: AAO x 3, normal affect/mood.  Ext: radial pulses intact, DP pulses intact, extremities warm, no cyanosis, clubbing or edema.       LABS:                        13.9   11.61 )-----------( 257      ( 2022 10:12 )             42.3     -07    142  |  115<H>  |  14  ----------------------------<  479<HH>  see note   |  4<LL>  |  0.48<L>    Ca    5.9<LL>      2022 12:50  Phos  1.6     -  Mg     1.9     -07    TPro  5.3<L>  /  Alb  2.9<L>  /  TBili  0.3  /  DBili  x   /  AST  29  /  ALT  25  /  AlkPhos  418<H>  -07    PT/INR - ( 2022 10:12 )   PT: 10.5 sec;   INR: 0.87          PTT - ( 2022 10:12 )  PTT:29.9 sec  Urinalysis Basic - ( 2022 08:28 )    Color: Yellow / Appearance: Clear / S.020 / pH: x  Gluc: x / Ketone: >=80 mg/dL  / Bili: NEGATIVE / Urobili: 0.2 E.U./dL   Blood: x / Protein: 30 mg/dL / Nitrite: NEGATIVE   Leuk Esterase: NEGATIVE / RBC: < 5 /HPF / WBC < 5 /HPF   Sq Epi: x / Non Sq Epi: 0-5 /HPF / Bacteria: Present /HPF        Thyroid Stimulating Hormone, Serum: 2.066 ( @ 07:58)      RADIOLOGY & ADDITIONAL STUDIES:  CAPILLARY BLOOD GLUCOSE      POCT Blood Glucose.: 297 mg/dL (2022 12:30)  POCT Blood Glucose.: 377 mg/dL (2022 11:06)  POCT Blood Glucose.: 412 mg/dL (2022 09:57)  POCT Blood Glucose.: >600 mg/dL (2022 08:56)  POCT Blood Glucose.: >600 mg/dL (2022 07:38)        A/P:21y Female    1.  DM- DKA new  likely newly diagnosed type 1  should get antibodies       Will continue to monitor     For discharge, pt can continue    Pt can follow up at discharge with [facility] by calling  to make an appointment.   Will discuss case with     and update primary team HPI: 21yFemale with no PMHx found altered by friend at home.   She is from [place] and is visiting a friend in [facility]. With confirmed history from mother of no PMHx but reported recent weight loss.   Patient was found screaming in the kitchen at 6:40 am today.  Her friend was awoken from sleep and found pt with standing in kitchen with eyes closed.  She quickly started to appear weak and was eased to the floor.  EMS was called within 15 minutes.  Upon EMS arrival, pt had a witnessed episode of n/v while laying supine.  She arrives altered/somnolent, Kussmaul breathing and glucose "high".  Pt unable to be cooperative with HPI/ROS.    FSBS 735 on presentation - 10units regular insulin given   pH 6.93, pCO2 25, bicarb 3.   BHB>8    s/p 4L Normal Saline bolus  Na 142  glucose 479  Calcium corrected 6.8 - 2g Calcium gluconate ordered   AG 23  Triglycerides 1298      Interval HPI- awake and remains slightly confused but answers some questions appropriately   Patient reports having lost 20 pound over the past year, was 105lbs and is now 85lbs. She reports associated cessation in menses. She has noticed increased thirst and urination and reports she drinks a lot of water.       Current Meds:  calcium gluconate IVPB 2 Gram(s) IV Intermittent once  chlorhexidine 2% Cloths 1 Application(s) Topical <User Schedule>  dextrose 5% 1000 milliLiter(s) IV Continuous <Continuous>  dextrose 50% Injectable 50 milliLiter(s) IV Push every 15 minutes  enoxaparin Injectable 40 milliGRAM(s) SubCutaneous every 24 hours  hydrocortisone sodium succinate Injectable 100 milliGRAM(s) IV Push every 8 hours  piperacillin/tazobactam IVPB.. 4.5 Gram(s) IV Intermittent every 6 hours  potassium phosphate IVPB 15 milliMole(s) IV Intermittent once      Allergies:  Allergy Status Unknown      ROS:  Denies the following except as indicated.    General: +weight loss/weight gain, decreased appetite, fatigue  Eyes: Blurry vision, double vision, visual changes  ENT: Throat pain, changes in voice,   CV: palpitations, SOB, CP, cough  GI: NVD, difficulty swallowing, abdominal pain  : +polyuria, dysuria  Endo: abnormal menses, temperature intolerance, decreased libido, +polydipsia   MSK: weakness, joint pain  Skin: rash, dryness, diaphoresis  Heme: Easy bruising,bleeding  Neuro: HA, dizziness, lightheadedness, numbness tingling  Psych: Anxiety, Depression    Vital Signs Last 24 Hrs  T(C): 35.6 (2022 09:16), Max: 35.6 (2022 09:16)  T(F): 96.1 (2022 09:16), Max: 96.1 (2022 09:16)  HR: 115 (2022 11:00) (99 - 115)  BP: 104/73 (2022 11:00) (96/69 - 120/81)  BP(mean): 83 (2022 11:00) (78 - 89)  RR: 18 (2022 11:00) (15 - 24)  SpO2: 100% (2022 11:00) (98% - 100%)    Weight (kg): 45.359 ( @ 07:46)      Constitutional: thin, kussmaul breathing noted   HEENT: NCAT, dry mucous membrane   Neck: no thyromegaly or palpable thyroid nodules   Respiratory: lungs CTAB.  Cardiovascular: tachycardic   GI: soft, NT/ND, no masses/HSM appreciated.  Neurology: no tremors  Skin: no visible rashes/lesions, no hyperpigmentation   Psychiatric: AAO x 2  Ext: radial pulses intact, DP pulses intact, extremities warm, no cyanosis, clubbing or edema.       LABS:                        13.9   11.61 )-----------( 257      ( 2022 10:12 )             42.3         142  |  115<H>  |  14  ----------------------------<  479<HH>  see note   |  4<LL>  |  0.48<L>    Ca    5.9<LL>      2022 12:50  Phos  1.6       Mg     1.9         TPro  5.3<L>  /  Alb  2.9<L>  /  TBili  0.3  /  DBili  x   /  AST  29  /  ALT  25  /  AlkPhos  418<H>      PT/INR - ( 2022 10:12 )   PT: 10.5 sec;   INR: 0.87          PTT - ( 2022 10:12 )  PTT:29.9 sec  Urinalysis Basic - ( 2022 08:28 )    Color: Yellow / Appearance: Clear / S.020 / pH: x  Gluc: x / Ketone: >=80 mg/dL  / Bili: NEGATIVE / Urobili: 0.2 E.U./dL   Blood: x / Protein: 30 mg/dL / Nitrite: NEGATIVE   Leuk Esterase: NEGATIVE / RBC: < 5 /HPF / WBC < 5 /HPF   Sq Epi: x / Non Sq Epi: 0-5 /HPF / Bacteria: Present /HPF        Thyroid Stimulating Hormone, Serum: 2.066 ( @ 07:58)      RADIOLOGY & ADDITIONAL STUDIES:  CAPILLARY BLOOD GLUCOSE      POCT Blood Glucose.: 297 mg/dL (2022 12:30)  POCT Blood Glucose.: 377 mg/dL (2022 11:06)  POCT Blood Glucose.: 412 mg/dL (2022 09:57)  POCT Blood Glucose.: >600 mg/dL (2022 08:56)  POCT Blood Glucose.: >600 mg/dL (2022 07:38)        A/P:21y Female with no PMHx presenting in DKA     1.  DM- DKA   likely newly diagnosed type 1 DM- should get antibodies   Currently remains in DKA- treat as per ICU protocol  Correction of fluid and electrolytes  Monitor potassium closely as patient is on bicarb drip as well  Was given 4L Normal Saline- currently hypernatremic and hyperchloremic  consider changing fluids to 1/2NS or LR---->change to dextrose containing fluids when glucose <250 as per protocol   Continue insulin drip until anion gap is closed x 2    pt being given 150mE bicarb in D5  Patient continues to have kussmaul breathing   pH now 7.10, bicarb remains 3  c/w bicarb drip, consider putting in sterile water as she has glucose 400s    If AG closes overnight, bridge with NPH in the morning     Will continue to monitor         Pt can follow up at discharge with [facility] by calling  to make an appointment.   Will discuss case with Dr. Lunsford and update primary team HPI: 21yFemale with no PMHx found altered by friend at home.   She is from [place] and is visiting a friend in [facility]. With confirmed history from mother of no PMHx but reported recent weight loss.   Patient was found screaming in the kitchen at 6:40 am today.  Her friend was awoken from sleep and found pt with standing in kitchen with eyes closed.  She quickly started to appear weak and was eased to the floor.  EMS was called within 15 minutes.  Upon EMS arrival, pt had a witnessed episode of n/v while laying supine.  She arrives altered/somnolent, Kussmaul breathing and glucose "high".  Pt unable to be cooperative with HPI/ROS.    FSBS 735 on presentation - 10units regular insulin given   pH 6.93, pCO2 25, bicarb 3.   BHB>8    s/p 4L Normal Saline bolus  Na 142  glucose 479  Calcium corrected 6.8 - 2g Calcium gluconate ordered   AG 23  Triglycerides 1298      Interval HPI- awake and remains slightly confused but answers some questions appropriately   Patient reports having lost 20 pound over the past year, was 105lbs and is now 85lbs. She reports associated cessation in menses. She has noticed increased thirst and urination and reports she drinks a lot of water.       Current Meds:  calcium gluconate IVPB 2 Gram(s) IV Intermittent once  chlorhexidine 2% Cloths 1 Application(s) Topical <User Schedule>  dextrose 5% 1000 milliLiter(s) IV Continuous <Continuous>  dextrose 50% Injectable 50 milliLiter(s) IV Push every 15 minutes  enoxaparin Injectable 40 milliGRAM(s) SubCutaneous every 24 hours  hydrocortisone sodium succinate Injectable 100 milliGRAM(s) IV Push every 8 hours  piperacillin/tazobactam IVPB.. 4.5 Gram(s) IV Intermittent every 6 hours  potassium phosphate IVPB 15 milliMole(s) IV Intermittent once      Allergies:  Allergy Status Unknown      ROS:  Denies the following except as indicated.    General: +weight loss/weight gain, decreased appetite, fatigue  Eyes: Blurry vision, double vision, visual changes  ENT: Throat pain, changes in voice,   CV: palpitations, SOB, CP, cough  GI: NVD, difficulty swallowing, abdominal pain  : +polyuria, dysuria  Endo: abnormal menses, temperature intolerance, decreased libido, +polydipsia   MSK: weakness, joint pain  Skin: rash, dryness, diaphoresis  Heme: Easy bruising,bleeding  Neuro: HA, dizziness, lightheadedness, numbness tingling  Psych: Anxiety, Depression    Vital Signs Last 24 Hrs  T(C): 35.6 (2022 09:16), Max: 35.6 (2022 09:16)  T(F): 96.1 (2022 09:16), Max: 96.1 (2022 09:16)  HR: 115 (2022 11:00) (99 - 115)  BP: 104/73 (2022 11:00) (96/69 - 120/81)  BP(mean): 83 (2022 11:00) (78 - 89)  RR: 18 (2022 11:00) (15 - 24)  SpO2: 100% (2022 11:00) (98% - 100%)    Weight (kg): 45.359 ( @ 07:46)      Constitutional: thin, kussmaul breathing noted   HEENT: NCAT, dry mucous membrane   Neck: no thyromegaly or palpable thyroid nodules   Respiratory: lungs CTAB.  Cardiovascular: tachycardic   GI: soft, NT/ND, no masses/HSM appreciated.  Neurology: no tremors  Skin: no visible rashes/lesions, no hyperpigmentation   Psychiatric: AAO x 2  Ext: radial pulses intact, DP pulses intact, extremities warm, no cyanosis, clubbing or edema.       LABS:                        13.9   11.61 )-----------( 257      ( 2022 10:12 )             42.3         142  |  115<H>  |  14  ----------------------------<  479<HH>  see note   |  4<LL>  |  0.48<L>    Ca    5.9<LL>      2022 12:50  Phos  1.6       Mg     1.9         TPro  5.3<L>  /  Alb  2.9<L>  /  TBili  0.3  /  DBili  x   /  AST  29  /  ALT  25  /  AlkPhos  418<H>      PT/INR - ( 2022 10:12 )   PT: 10.5 sec;   INR: 0.87          PTT - ( 2022 10:12 )  PTT:29.9 sec  Urinalysis Basic - ( 2022 08:28 )    Color: Yellow / Appearance: Clear / S.020 / pH: x  Gluc: x / Ketone: >=80 mg/dL  / Bili: NEGATIVE / Urobili: 0.2 E.U./dL   Blood: x / Protein: 30 mg/dL / Nitrite: NEGATIVE   Leuk Esterase: NEGATIVE / RBC: < 5 /HPF / WBC < 5 /HPF   Sq Epi: x / Non Sq Epi: 0-5 /HPF / Bacteria: Present /HPF        Thyroid Stimulating Hormone, Serum: 2.066 ( @ 07:58)      RADIOLOGY & ADDITIONAL STUDIES:  CAPILLARY BLOOD GLUCOSE      POCT Blood Glucose.: 297 mg/dL (2022 12:30)  POCT Blood Glucose.: 377 mg/dL (2022 11:06)  POCT Blood Glucose.: 412 mg/dL (2022 09:57)  POCT Blood Glucose.: >600 mg/dL (2022 08:56)  POCT Blood Glucose.: >600 mg/dL (2022 07:38)        A/P:21y Female with no PMHx presenting in DKA     1.  DM- DKA   likely newly diagnosed type 1 DM- should get antibodies   Currently remains in DKA- treat as per ICU protocol  Correction of fluid and electrolytes  Monitor potassium closely as patient is on bicarb drip as well  Was given 4L Normal Saline- currently hypernatremic and hyperchloremic  consider changing fluids to 1/2NS or LR---->change to dextrose containing fluids when glucose <250 as per protocol   Continue insulin drip until anion gap is closed x 2    pt being given 150mE bicarb in D5  Patient continues to have kussmaul breathing   pH now 7.10, bicarb remains 3  c/w bicarb drip, consider putting in sterile water as she has glucose 400s    If AG closes overnight, bridge with NPH in the morning       2. Hypocalcemia-  noted to have drop in calcium after admission  6.8 corrected --->give 2 g calcium gluconate   unknown etiology, will assess parathyroid gland involvement (polyglandular syndrome? MEN1?)  Cortisol level done prior to hydrocortisone given- Cortisol: 37.17 adequate (no sign of adrenal insufficiency)  no need for hydrocortisone   f/u VitD25 level  f/u PTH with calcium level  monitor serial bmps          Will continue to monitor         Pt can follow up at discharge with [facility] by calling  to make an appointment.   Will discuss case with Dr. Lunsford and update primary team

## 2022-01-07 NOTE — PATIENT PROFILE ADULT - FALL HARM RISK - HARM RISK INTERVENTIONS
Assistance with ambulation/Assistance OOB with selected safe patient handling equipment/Communicate Risk of Fall with Harm to all staff/Discuss with provider need for PT consult/Monitor gait and stability/Reinforce activity limits and safety measures with patient and family/Tailored Fall Risk Interventions/Visual Cue: Yellow wristband and red socks/Bed in lowest position, wheels locked, appropriate side rails in place/Call bell, personal items and telephone in reach/Instruct patient to call for assistance before getting out of bed or chair/Non-slip footwear when patient is out of bed/[place] to call system/Physically safe environment - no spills, clutter or unnecessary equipment/Purposeful Proactive Rounding/Room/bathroom lighting operational, light cord in reach

## 2022-01-07 NOTE — PROVIDER CONTACT NOTE (EICU) - BACKGROUND
Was contacted by bedside MD for evaluation for new DKA  21 year old female with   who p/w Ams, n/v and possible aspiration pneumonia. in eD found to have DKA and AMS.   Workup thus far shows AG metabolic acidosis, hyperglycemia, hypokalemia and possible lipemia on blood work  Patient has received IVF, IV insulin, zosyn, bicarb  patient plan to receive supplemental K and start insulin drip after K supplementation  patient bp, sat, RR stable, verbal and responsive on monitor when name was called.

## 2022-01-07 NOTE — H&P ADULT - ATTENDING COMMENTS
New diagnosis of diabetes with DKA. Profound acidosis. Bicarb gtt until pH > 7.0. BMP Q4. Replete K aggressively. FS Q1. Obtain collateral history from family/friends.

## 2022-01-07 NOTE — PROVIDER CONTACT NOTE (EICU) - SITUATION
eICU intervention  patient location [facility] ED  Provider location [facility]

## 2022-01-07 NOTE — ED ADULT TRIAGE NOTE - CHIEF COMPLAINT QUOTE
Pt BIBA as notification c/o unresponsive. As per friend who arrives with pt, pt c/o recent weight loss and anemia. Pt withdraws to painful stimuli. FS in field 595, no hx of diabetes. Given about 200 cc NS PTA, 20g to L AC placed by EMS. ED team at the bedside. Pt BIBA as notification c/o found unresponsive. As per friend who arrives with pt, pt c/o recent weight loss and anemia. Pt withdraws to painful stimuli. FS in field 595, no hx of diabetes. 1 ep of vomiting prior to EMS arrival. Given about 200 cc NS PTA, 20g to L AC placed by EMS. ED team at the bedside.

## 2022-01-07 NOTE — ED PROVIDER NOTE - CRITICAL CARE ATTENDING CONTRIBUTION TO CARE
The patient was seen immediately upon arrival due to a high probability of imminent or life-threatening deterioration secondary to dka / metabolic acidosis , which required my direct attention, intervention, and personal management at the bedside. I have personally provided critical care time exclusive of time spent on separately billable procedures. Time includes review of laboratory data, radiology results, discussion with consultants, discussion with the patient's family, and monitoring for potential decompensation.    additional information obtained from friend present in the ER     the patient demonstrated slow progressive clinical improvement throughout er stay.   ivf, mg ,k, hco3, insulin administered   initially somnolent  and with treatment became awake/drowsy - answers questions & moving extremities appropriately      I was present when SURY Holt discussed case with dr jesus tele icu @[facility] 820   NP colon discussed case with dr. Weiss at approximately 825am and 9am     EMS present to take patient L&S to [facility] @ 900. The patient was seen immediately upon arrival due to a high probability of imminent or life-threatening deterioration secondary to dka / metabolic acidosis , which required my direct attention, intervention, and personal management at the bedside. I have personally provided critical care time exclusive of time spent on separately billable procedures. Time includes review of laboratory data, radiology results, discussion with consultants, discussion with the patient's family, and monitoring for potential decompensation.    additional information obtained from friend present in the ER     20 yo woman w dka & severe metabolic acidosis     the patient demonstrated progressive clinical improvement throughout er stay with administration of ivf, mg ,k, hco3 & insulin.  initially somnolent minimally responsive and with treatment became awake/drowsy - answers questions & moving extremities appropriately.    I was present when SURY Holt discussed case with dr jesus tele icu @[facility] 820   NP colon discussed case with dr. Weiss at approximately 825am and 9am    EMS present to take patient L&S to [facility] @ 061.

## 2022-01-07 NOTE — PROVIDER CONTACT NOTE (EICU) - RECOMMENDATIONS
IVF bolus, IVF with potassium  Check Mag, Lipid profile(TG),   supplement and monitor lytes.   IV insulin drip   Trend and monitor lytes  NPO  IV antibiotics  at this time patient does not appear to require intubation

## 2022-01-07 NOTE — PATIENT PROFILE ADULT - HAS THE PATIENT RECEIVED THE INFLUENZA VACCINE THIS SEASON?
Diabetes under excellent control    Hypertension stable on current medication    COPD following with pulmonology nebulizer twice a day and continuous O2    History of prostate cancer had been following with Urology    Chronic kidney disease following with Nephrology creatinine at the
unable to assess immunization status...

## 2022-01-07 NOTE — H&P ADULT - HISTORY OF PRESENT ILLNESS
Patient is a 22yo F w/ no PMH who presented to hospital after being found in kitchen screaming by friend and appeared weak. BIBEMS and patient found to be in DKA and with AMS. Admitted to MICU for management of DKA. Patient was not able to give history so history gathered from ED documentation and patient's mother. As per ED: She is visiting [facility] and staying at friends home. Pts mother noted that pt does not have PMH but has noted recent weight loss. Pt was found screaming in the kitchen at 6:40 am today.  Her friend was awoken from sleep and found pt with standing in kitchen with eyes closed. She quickly started to appear weak and was eased to the floor. EMS was called within 15 minutes. Upon EMS arrival, pt had a witnessed episode of n/v while laying supine.    ED course:   vitals: temp 95.2, , /81, RR 24,  Patient is a 22yo F w/ no PMH who presented to hospital after being found in kitchen screaming by friend and appeared weak. BIBEMS Patient was not able to give full history so history gathered from ED documentation and patient's mother. Patient did however report weight loss and increased urination frequency. As per ED: She is visiting [facility] and staying at friends home. Pts mother noted via phone that pt does not have PMH but has noted recent weight loss. Pt was found screaming in the kitchen at 6:40 am today.  Her friend was awoken from sleep and found pt with standing in kitchen with eyes closed. She quickly started to appear weak and was eased to the floor. EMS was called within 15 minutes. Upon EMS arrival, pt had a witnessed episode of n/v while laying supine.    ED course:   vitals: temp 95.2, , /81, RR 24, O2sat 100 NRB  labs: wbc 11, bands 9%, glucose >600, AG >26, alk phos 487, cortisol 37, A1C >20, chol 346, triglycerides 1298, lactate 1.1, venous PH <6.93  UA neg, w/ glucosuria and ketonuria  CT head: no acute findings  EKG: sinus tach, QTc 491  CXR: wnl

## 2022-01-08 DIAGNOSIS — E87.8 OTHER DISORDERS OF ELECTROLYTE AND FLUID BALANCE, NOT ELSEWHERE CLASSIFIED: ICD-10-CM

## 2022-01-08 DIAGNOSIS — G93.41 METABOLIC ENCEPHALOPATHY: ICD-10-CM

## 2022-01-08 DIAGNOSIS — E11.10 TYPE 2 DIABETES MELLITUS WITH KETOACIDOSIS WITHOUT COMA: ICD-10-CM

## 2022-01-08 DIAGNOSIS — R63.8 OTHER SYMPTOMS AND SIGNS CONCERNING FOOD AND FLUID INTAKE: ICD-10-CM

## 2022-01-08 LAB
24R-OH-CALCIDIOL SERPL-MCNC: 32.2 NG/ML — SIGNIFICANT CHANGE UP (ref 30–80)
ALBUMIN SERPL ELPH-MCNC: 3.1 G/DL — LOW (ref 3.3–5)
ALP SERPL-CCNC: 209 U/L — HIGH (ref 40–120)
ALT FLD-CCNC: 19 U/L — SIGNIFICANT CHANGE UP (ref 10–45)
ANION GAP SERPL CALC-SCNC: 10 MMOL/L — SIGNIFICANT CHANGE UP (ref 5–17)
ANION GAP SERPL CALC-SCNC: 12 MMOL/L — SIGNIFICANT CHANGE UP (ref 5–17)
ANION GAP SERPL CALC-SCNC: 12 MMOL/L — SIGNIFICANT CHANGE UP (ref 5–17)
ANION GAP SERPL CALC-SCNC: 16 MMOL/L — SIGNIFICANT CHANGE UP (ref 5–17)
ANION GAP SERPL CALC-SCNC: 16 MMOL/L — SIGNIFICANT CHANGE UP (ref 5–17)
ANION GAP SERPL CALC-SCNC: 20 MMOL/L — HIGH (ref 5–17)
ANISOCYTOSIS BLD QL: SIGNIFICANT CHANGE UP
AST SERPL-CCNC: 25 U/L — SIGNIFICANT CHANGE UP (ref 10–40)
BASE EXCESS BLDA CALC-SCNC: -14.4 MMOL/L — LOW (ref -2–3)
BASE EXCESS BLDA CALC-SCNC: -14.7 MMOL/L — LOW (ref -2–3)
BASE EXCESS BLDA CALC-SCNC: -8.8 MMOL/L — LOW (ref -2–3)
BASOPHILS # BLD AUTO: 0.04 K/UL — SIGNIFICANT CHANGE UP (ref 0–0.2)
BASOPHILS NFR BLD AUTO: 0.9 % — SIGNIFICANT CHANGE UP (ref 0–2)
BILIRUB SERPL-MCNC: 0.3 MG/DL — SIGNIFICANT CHANGE UP (ref 0.2–1.2)
BUN SERPL-MCNC: 10 MG/DL — SIGNIFICANT CHANGE UP (ref 7–23)
BUN SERPL-MCNC: 7 MG/DL — SIGNIFICANT CHANGE UP (ref 7–23)
BUN SERPL-MCNC: 8 MG/DL — SIGNIFICANT CHANGE UP (ref 7–23)
BUN SERPL-MCNC: 8 MG/DL — SIGNIFICANT CHANGE UP (ref 7–23)
BUN SERPL-MCNC: 9 MG/DL — SIGNIFICANT CHANGE UP (ref 7–23)
BUN SERPL-MCNC: 9 MG/DL — SIGNIFICANT CHANGE UP (ref 7–23)
C PEPTIDE SERPL-MCNC: 0.3 NG/ML — LOW (ref 1.1–4.4)
C PEPTIDE SERPL-MCNC: 0.4 NG/ML — LOW (ref 1.1–4.4)
CALCIUM SERPL-MCNC: 7.2 MG/DL — LOW (ref 8.4–10.5)
CALCIUM SERPL-MCNC: 7.7 MG/DL — LOW (ref 8.4–10.5)
CALCIUM SERPL-MCNC: 7.7 MG/DL — LOW (ref 8.4–10.5)
CALCIUM SERPL-MCNC: 7.8 MG/DL — LOW (ref 8.4–10.5)
CALCIUM SERPL-MCNC: 7.9 MG/DL — LOW (ref 8.4–10.5)
CALCIUM SERPL-MCNC: 7.9 MG/DL — LOW (ref 8.4–10.5)
CALCIUM SERPL-MCNC: 8 MG/DL — LOW (ref 8.4–10.5)
CHLORIDE SERPL-SCNC: 113 MMOL/L — HIGH (ref 96–108)
CHLORIDE SERPL-SCNC: 117 MMOL/L — HIGH (ref 96–108)
CHLORIDE SERPL-SCNC: 119 MMOL/L — HIGH (ref 96–108)
CHLORIDE SERPL-SCNC: 119 MMOL/L — HIGH (ref 96–108)
CO2 BLDA-SCNC: 11 MMOL/L — LOW (ref 19–24)
CO2 BLDA-SCNC: 16 MMOL/L — LOW (ref 19–24)
CO2 BLDA-SCNC: 9 MMOL/L — LOW (ref 19–24)
CO2 SERPL-SCNC: 10 MMOL/L — CRITICAL LOW (ref 22–31)
CO2 SERPL-SCNC: 10 MMOL/L — CRITICAL LOW (ref 22–31)
CO2 SERPL-SCNC: 14 MMOL/L — LOW (ref 22–31)
CO2 SERPL-SCNC: 15 MMOL/L — LOW (ref 22–31)
CO2 SERPL-SCNC: 15 MMOL/L — LOW (ref 22–31)
CO2 SERPL-SCNC: 8 MMOL/L — CRITICAL LOW (ref 22–31)
CORTIS AM PEAK SERPL-MCNC: 22.49 UG/DL — HIGH (ref 6.02–18.4)
CREAT SERPL-MCNC: 0.8 MG/DL — SIGNIFICANT CHANGE UP (ref 0.5–1.3)
CREAT SERPL-MCNC: 0.83 MG/DL — SIGNIFICANT CHANGE UP (ref 0.5–1.3)
CREAT SERPL-MCNC: 0.84 MG/DL — SIGNIFICANT CHANGE UP (ref 0.5–1.3)
CREAT SERPL-MCNC: 0.86 MG/DL — SIGNIFICANT CHANGE UP (ref 0.5–1.3)
CREAT SERPL-MCNC: 0.9 MG/DL — SIGNIFICANT CHANGE UP (ref 0.5–1.3)
CREAT SERPL-MCNC: 1.04 MG/DL — SIGNIFICANT CHANGE UP (ref 0.5–1.3)
DACRYOCYTES BLD QL SMEAR: SLIGHT — SIGNIFICANT CHANGE UP
EOSINOPHIL # BLD AUTO: 0 K/UL — SIGNIFICANT CHANGE UP (ref 0–0.5)
EOSINOPHIL NFR BLD AUTO: 0 % — SIGNIFICANT CHANGE UP (ref 0–6)
GIANT PLATELETS BLD QL SMEAR: PRESENT — SIGNIFICANT CHANGE UP
GLUCOSE BLDC GLUCOMTR-MCNC: 189 MG/DL — HIGH (ref 70–99)
GLUCOSE BLDC GLUCOMTR-MCNC: 199 MG/DL — HIGH (ref 70–99)
GLUCOSE BLDC GLUCOMTR-MCNC: 209 MG/DL — HIGH (ref 70–99)
GLUCOSE BLDC GLUCOMTR-MCNC: 209 MG/DL — HIGH (ref 70–99)
GLUCOSE BLDC GLUCOMTR-MCNC: 221 MG/DL — HIGH (ref 70–99)
GLUCOSE BLDC GLUCOMTR-MCNC: 221 MG/DL — HIGH (ref 70–99)
GLUCOSE BLDC GLUCOMTR-MCNC: 230 MG/DL — HIGH (ref 70–99)
GLUCOSE BLDC GLUCOMTR-MCNC: 233 MG/DL — HIGH (ref 70–99)
GLUCOSE BLDC GLUCOMTR-MCNC: 259 MG/DL — HIGH (ref 70–99)
GLUCOSE BLDC GLUCOMTR-MCNC: 260 MG/DL — HIGH (ref 70–99)
GLUCOSE BLDC GLUCOMTR-MCNC: 269 MG/DL — HIGH (ref 70–99)
GLUCOSE BLDC GLUCOMTR-MCNC: 284 MG/DL — HIGH (ref 70–99)
GLUCOSE BLDC GLUCOMTR-MCNC: 288 MG/DL — HIGH (ref 70–99)
GLUCOSE BLDC GLUCOMTR-MCNC: 324 MG/DL — HIGH (ref 70–99)
GLUCOSE BLDC GLUCOMTR-MCNC: 334 MG/DL — HIGH (ref 70–99)
GLUCOSE BLDC GLUCOMTR-MCNC: 357 MG/DL — HIGH (ref 70–99)
GLUCOSE BLDC GLUCOMTR-MCNC: 367 MG/DL — HIGH (ref 70–99)
GLUCOSE SERPL-MCNC: 225 MG/DL — HIGH (ref 70–99)
GLUCOSE SERPL-MCNC: 228 MG/DL — HIGH (ref 70–99)
GLUCOSE SERPL-MCNC: 257 MG/DL — HIGH (ref 70–99)
GLUCOSE SERPL-MCNC: 269 MG/DL — HIGH (ref 70–99)
GLUCOSE SERPL-MCNC: 326 MG/DL — HIGH (ref 70–99)
GLUCOSE SERPL-MCNC: 358 MG/DL — HIGH (ref 70–99)
HCO3 BLDA-SCNC: 11 MMOL/L — LOW (ref 21–28)
HCO3 BLDA-SCNC: 16 MMOL/L — LOW (ref 21–28)
HCO3 BLDA-SCNC: 9 MMOL/L — CRITICAL LOW (ref 21–28)
HCT VFR BLD CALC: 27.9 % — LOW (ref 34.5–45)
HGB BLD-MCNC: 10.2 G/DL — LOW (ref 11.5–15.5)
LYMPHOCYTES # BLD AUTO: 1.04 K/UL — SIGNIFICANT CHANGE UP (ref 1–3.3)
LYMPHOCYTES # BLD AUTO: 22 % — SIGNIFICANT CHANGE UP (ref 13–44)
MAGNESIUM SERPL-MCNC: 1.7 MG/DL — SIGNIFICANT CHANGE UP (ref 1.6–2.6)
MAGNESIUM SERPL-MCNC: 1.9 MG/DL — SIGNIFICANT CHANGE UP (ref 1.6–2.6)
MAGNESIUM SERPL-MCNC: 2 MG/DL — SIGNIFICANT CHANGE UP (ref 1.6–2.6)
MAGNESIUM SERPL-MCNC: 2.5 MG/DL — SIGNIFICANT CHANGE UP (ref 1.6–2.6)
MANUAL SMEAR VERIFICATION: SIGNIFICANT CHANGE UP
MCHC RBC-ENTMCNC: 29.1 PG — SIGNIFICANT CHANGE UP (ref 27–34)
MCHC RBC-ENTMCNC: 36.6 GM/DL — HIGH (ref 32–36)
MCV RBC AUTO: 79.7 FL — LOW (ref 80–100)
MICROCYTES BLD QL: SIGNIFICANT CHANGE UP
MONOCYTES # BLD AUTO: 0.22 K/UL — SIGNIFICANT CHANGE UP (ref 0–0.9)
MONOCYTES NFR BLD AUTO: 4.6 % — SIGNIFICANT CHANGE UP (ref 2–14)
NEUTROPHILS # BLD AUTO: 3.33 K/UL — SIGNIFICANT CHANGE UP (ref 1.8–7.4)
NEUTROPHILS NFR BLD AUTO: 69.7 % — SIGNIFICANT CHANGE UP (ref 43–77)
NEUTS BAND # BLD: 0.9 % — SIGNIFICANT CHANGE UP (ref 0–8)
PCO2 BLDA: 23 MMHG — LOW (ref 32–35)
PCO2 BLDA: 29 MMHG — LOW (ref 32–35)
PCO2 BLDA: <18 MMHG — LOW (ref 32–35)
PH BLDA: 7.27 — LOW (ref 7.35–7.45)
PH BLDA: 7.32 — LOW (ref 7.35–7.45)
PH BLDA: 7.34 — LOW (ref 7.35–7.45)
PHOSPHATE SERPL-MCNC: 1.2 MG/DL — LOW (ref 2.5–4.5)
PHOSPHATE SERPL-MCNC: 2.5 MG/DL — SIGNIFICANT CHANGE UP (ref 2.5–4.5)
PHOSPHATE SERPL-MCNC: 3 MG/DL — SIGNIFICANT CHANGE UP (ref 2.5–4.5)
PLAT MORPH BLD: ABNORMAL
PLATELET # BLD AUTO: 162 K/UL — SIGNIFICANT CHANGE UP (ref 150–400)
PO2 BLDA: 108 MMHG — SIGNIFICANT CHANGE UP (ref 83–108)
PO2 BLDA: 136 MMHG — HIGH (ref 83–108)
PO2 BLDA: 149 MMHG — HIGH (ref 83–108)
POIKILOCYTOSIS BLD QL AUTO: SLIGHT — SIGNIFICANT CHANGE UP
POTASSIUM SERPL-MCNC: 3.2 MMOL/L — LOW (ref 3.5–5.3)
POTASSIUM SERPL-MCNC: 3.4 MMOL/L — LOW (ref 3.5–5.3)
POTASSIUM SERPL-MCNC: 3.8 MMOL/L — SIGNIFICANT CHANGE UP (ref 3.5–5.3)
POTASSIUM SERPL-MCNC: 4.3 MMOL/L — SIGNIFICANT CHANGE UP (ref 3.5–5.3)
POTASSIUM SERPL-MCNC: 4.5 MMOL/L — SIGNIFICANT CHANGE UP (ref 3.5–5.3)
POTASSIUM SERPL-MCNC: SIGNIFICANT CHANGE UP MMOL/L (ref 3.5–5.3)
POTASSIUM SERPL-SCNC: 3.2 MMOL/L — LOW (ref 3.5–5.3)
POTASSIUM SERPL-SCNC: 3.4 MMOL/L — LOW (ref 3.5–5.3)
POTASSIUM SERPL-SCNC: 3.8 MMOL/L — SIGNIFICANT CHANGE UP (ref 3.5–5.3)
POTASSIUM SERPL-SCNC: 4.3 MMOL/L — SIGNIFICANT CHANGE UP (ref 3.5–5.3)
POTASSIUM SERPL-SCNC: 4.5 MMOL/L — SIGNIFICANT CHANGE UP (ref 3.5–5.3)
POTASSIUM SERPL-SCNC: SIGNIFICANT CHANGE UP MMOL/L (ref 3.5–5.3)
PROT SERPL-MCNC: 4.6 G/DL — LOW (ref 6–8.3)
PTH-INTACT FLD-MCNC: 43 PG/ML — SIGNIFICANT CHANGE UP (ref 15–65)
RBC # BLD: 3.5 M/UL — LOW (ref 3.8–5.2)
RBC # FLD: 13.7 % — SIGNIFICANT CHANGE UP (ref 10.3–14.5)
RBC BLD AUTO: ABNORMAL
SAO2 % BLDA: 100 % — HIGH (ref 94–98)
SAO2 % BLDA: 99.1 % — HIGH (ref 94–98)
SAO2 % BLDA: 99.6 % — HIGH (ref 94–98)
SCHISTOCYTES BLD QL AUTO: SLIGHT — SIGNIFICANT CHANGE UP
SMUDGE CELLS # BLD: PRESENT — SIGNIFICANT CHANGE UP
SODIUM SERPL-SCNC: 137 MMOL/L — SIGNIFICANT CHANGE UP (ref 135–145)
SODIUM SERPL-SCNC: 139 MMOL/L — SIGNIFICANT CHANGE UP (ref 135–145)
SODIUM SERPL-SCNC: 139 MMOL/L — SIGNIFICANT CHANGE UP (ref 135–145)
SODIUM SERPL-SCNC: 144 MMOL/L — SIGNIFICANT CHANGE UP (ref 135–145)
SODIUM SERPL-SCNC: 146 MMOL/L — HIGH (ref 135–145)
SODIUM SERPL-SCNC: 147 MMOL/L — HIGH (ref 135–145)
VANCOMYCIN FLD-MCNC: 4.5 UG/ML — SIGNIFICANT CHANGE UP
VARIANT LYMPHS # BLD: 1.9 % — SIGNIFICANT CHANGE UP (ref 0–6)
VIT D25+D1,25 OH+D1,25 PNL SERPL-MCNC: 47.7 PG/ML — SIGNIFICANT CHANGE UP (ref 19.9–79.3)
WBC # BLD: 4.71 K/UL — SIGNIFICANT CHANGE UP (ref 3.8–10.5)
WBC # FLD AUTO: 4.71 K/UL — SIGNIFICANT CHANGE UP (ref 3.8–10.5)

## 2022-01-08 PROCEDURE — 99233 SBSQ HOSP IP/OBS HIGH 50: CPT | Mod: GC

## 2022-01-08 PROCEDURE — 99232 SBSQ HOSP IP/OBS MODERATE 35: CPT | Mod: GC

## 2022-01-08 PROCEDURE — 99232 SBSQ HOSP IP/OBS MODERATE 35: CPT

## 2022-01-08 RX ORDER — POTASSIUM PHOSPHATE, MONOBASIC POTASSIUM PHOSPHATE, DIBASIC 236; 224 MG/ML; MG/ML
30 INJECTION, SOLUTION INTRAVENOUS ONCE
Refills: 0 | Status: DISCONTINUED | OUTPATIENT
Start: 2022-01-08 | End: 2022-01-08

## 2022-01-08 RX ORDER — INSULIN LISPRO 100/ML
VIAL (ML) SUBCUTANEOUS
Refills: 0 | Status: DISCONTINUED | OUTPATIENT
Start: 2022-01-08 | End: 2022-01-10

## 2022-01-08 RX ORDER — INSULIN GLARGINE 100 [IU]/ML
12 INJECTION, SOLUTION SUBCUTANEOUS AT BEDTIME
Refills: 0 | Status: DISCONTINUED | OUTPATIENT
Start: 2022-01-08 | End: 2022-01-10

## 2022-01-08 RX ORDER — SODIUM CHLORIDE 9 MG/ML
1000 INJECTION, SOLUTION INTRAVENOUS
Refills: 0 | Status: DISCONTINUED | OUTPATIENT
Start: 2022-01-08 | End: 2022-01-10

## 2022-01-08 RX ORDER — POTASSIUM CHLORIDE 20 MEQ
10 PACKET (EA) ORAL
Refills: 0 | Status: COMPLETED | OUTPATIENT
Start: 2022-01-08 | End: 2022-01-08

## 2022-01-08 RX ORDER — SODIUM BICARBONATE 1 MEQ/ML
1300 SYRINGE (ML) INTRAVENOUS EVERY 8 HOURS
Refills: 0 | Status: DISCONTINUED | OUTPATIENT
Start: 2022-01-08 | End: 2022-01-10

## 2022-01-08 RX ORDER — POTASSIUM CHLORIDE 20 MEQ
20 PACKET (EA) ORAL ONCE
Refills: 0 | Status: COMPLETED | OUTPATIENT
Start: 2022-01-08 | End: 2022-01-08

## 2022-01-08 RX ORDER — INSULIN GLARGINE 100 [IU]/ML
12 INJECTION, SOLUTION SUBCUTANEOUS ONCE
Refills: 0 | Status: COMPLETED | OUTPATIENT
Start: 2022-01-08 | End: 2022-01-08

## 2022-01-08 RX ORDER — INSULIN LISPRO 100/ML
5 VIAL (ML) SUBCUTANEOUS
Refills: 0 | Status: DISCONTINUED | OUTPATIENT
Start: 2022-01-08 | End: 2022-01-10

## 2022-01-08 RX ORDER — ACETAMINOPHEN 500 MG
650 TABLET ORAL ONCE
Refills: 0 | Status: COMPLETED | OUTPATIENT
Start: 2022-01-08 | End: 2022-01-08

## 2022-01-08 RX ORDER — DEXTROSE 50 % IN WATER 50 %
15 SYRINGE (ML) INTRAVENOUS ONCE
Refills: 0 | Status: DISCONTINUED | OUTPATIENT
Start: 2022-01-08 | End: 2022-01-10

## 2022-01-08 RX ORDER — ENOXAPARIN SODIUM 100 MG/ML
30 INJECTION SUBCUTANEOUS EVERY 24 HOURS
Refills: 0 | Status: DISCONTINUED | OUTPATIENT
Start: 2022-01-08 | End: 2022-01-09

## 2022-01-08 RX ORDER — SODIUM CHLORIDE 9 MG/ML
1000 INJECTION, SOLUTION INTRAVENOUS
Refills: 0 | Status: DISCONTINUED | OUTPATIENT
Start: 2022-01-08 | End: 2022-01-08

## 2022-01-08 RX ORDER — POTASSIUM CHLORIDE 20 MEQ
40 PACKET (EA) ORAL
Refills: 0 | Status: COMPLETED | OUTPATIENT
Start: 2022-01-08 | End: 2022-01-08

## 2022-01-08 RX ORDER — MAGNESIUM SULFATE 500 MG/ML
2 VIAL (ML) INJECTION ONCE
Refills: 0 | Status: COMPLETED | OUTPATIENT
Start: 2022-01-08 | End: 2022-01-08

## 2022-01-08 RX ORDER — GLUCAGON INJECTION, SOLUTION 0.5 MG/.1ML
1 INJECTION, SOLUTION SUBCUTANEOUS ONCE
Refills: 0 | Status: DISCONTINUED | OUTPATIENT
Start: 2022-01-08 | End: 2022-01-10

## 2022-01-08 RX ADMIN — Medication 5 UNIT(S): at 17:08

## 2022-01-08 RX ADMIN — INSULIN GLARGINE 12 UNIT(S): 100 INJECTION, SOLUTION SUBCUTANEOUS at 12:27

## 2022-01-08 RX ADMIN — Medication 4: at 17:08

## 2022-01-08 RX ADMIN — Medication 650 MILLIGRAM(S): at 02:21

## 2022-01-08 RX ADMIN — Medication 650 MILLIGRAM(S): at 03:32

## 2022-01-08 RX ADMIN — PIPERACILLIN AND TAZOBACTAM 200 GRAM(S): 4; .5 INJECTION, POWDER, LYOPHILIZED, FOR SOLUTION INTRAVENOUS at 01:33

## 2022-01-08 RX ADMIN — PIPERACILLIN AND TAZOBACTAM 200 GRAM(S): 4; .5 INJECTION, POWDER, LYOPHILIZED, FOR SOLUTION INTRAVENOUS at 07:02

## 2022-01-08 RX ADMIN — Medication 40 MILLIEQUIVALENT(S): at 08:02

## 2022-01-08 RX ADMIN — Medication 100 MILLIEQUIVALENT(S): at 06:14

## 2022-01-08 RX ADMIN — SODIUM CHLORIDE 200 MILLILITER(S): 9 INJECTION, SOLUTION INTRAVENOUS at 04:16

## 2022-01-08 RX ADMIN — Medication 100 MILLIEQUIVALENT(S): at 07:23

## 2022-01-08 RX ADMIN — Medication 40 MILLIEQUIVALENT(S): at 06:14

## 2022-01-08 RX ADMIN — Medication 25 GRAM(S): at 06:02

## 2022-01-08 RX ADMIN — SODIUM CHLORIDE 200 MILLILITER(S): 9 INJECTION, SOLUTION INTRAVENOUS at 00:09

## 2022-01-08 RX ADMIN — ENOXAPARIN SODIUM 30 MILLIGRAM(S): 100 INJECTION SUBCUTANEOUS at 14:56

## 2022-01-08 RX ADMIN — Medication 1300 MILLIGRAM(S): at 14:56

## 2022-01-08 RX ADMIN — CHLORHEXIDINE GLUCONATE 1 APPLICATION(S): 213 SOLUTION TOPICAL at 06:27

## 2022-01-08 RX ADMIN — INSULIN GLARGINE 12 UNIT(S): 100 INJECTION, SOLUTION SUBCUTANEOUS at 22:08

## 2022-01-08 RX ADMIN — Medication 1300 MILLIGRAM(S): at 22:11

## 2022-01-08 RX ADMIN — Medication 20 MILLIEQUIVALENT(S): at 18:47

## 2022-01-08 RX ADMIN — Medication 10: at 22:07

## 2022-01-08 NOTE — PROGRESS NOTE ADULT - PROBLEM SELECTOR PLAN 4
F: PO  E: replenish aggressively  N: NPO  VTE Prophylaxis: Lovenox 40mg q24h  GI: not needed  C: Full Code  D: home F: PO  E: replenish aggressively  N: NPO  VTE Prophylaxis: none, patient mobile   GI: not needed  C: Full Code  D: home

## 2022-01-08 NOTE — PROGRESS NOTE ADULT - ASSESSMENT
20 yo previously healthy female BIBEMS after being found altered and weak at a friends apartment found to be newly diagnosed DM1 in DKA, anion gap closed and transitioned to sub-Q insulin now stable.

## 2022-01-08 NOTE — PROGRESS NOTE ADULT - SUBJECTIVE AND OBJECTIVE BOX
incomplete      INTERVAL EVENTS: JOHNATHAN    SUBJECTIVE / INTERVAL HPI: Patient seen and examined at bedside.     ROS: negative unless otherwise stated above.    VITAL SIGNS:  Vital Signs Last 24 Hrs  T(C): 38 (2022 00:50), Max: 38 (2022 00:50)  T(F): 100.4 (2022 00:50), Max: 100.4 (2022 00:50)  HR: 96 (2022 05:00) (96 - 135)  BP: 84/48 (2022 05:00) (82/52 - 120/81)  BP(mean): 60 (2022 05:00) (60 - 94)  RR: 14 (2022 05:00) (12 - 26)  SpO2: 100% (2022 05:00) (98% - 100%)      22 @ 07:01  -  22 @ 05:53  --------------------------------------------------------  IN: 6954.1 mL / OUT: 9485 mL / NET: -2530.9 mL        PHYSICAL EXAM:    General: NAD  HEENT: MMM  Neck: supple  Cardiovascular: +S1/S2; RRR  Respiratory: CTA B/L; no W/R/R  Gastrointestinal: soft, NT/ND  Extremities: WWP; no edema, clubbing or cyanosis  Vascular: 2+ radial, DP/PT pulses B/L  Neurological: AAOx3; no focal deficits    MEDICATIONS:  MEDICATIONS  (STANDING):  chlorhexidine 2% Cloths 1 Application(s) Topical <User Schedule>  dextrose 5% + sodium chloride 0.45%. 1000 milliLiter(s) (200 mL/Hr) IV Continuous <Continuous>  dextrose 50% Injectable 50 milliLiter(s) IV Push every 15 minutes  enoxaparin Injectable 40 milliGRAM(s) SubCutaneous every 24 hours  insulin regular Infusion 4 Unit(s)/Hr (4 mL/Hr) IV Continuous <Continuous>  magnesium sulfate  IVPB 2 Gram(s) IV Intermittent once  piperacillin/tazobactam IVPB.. 4.5 Gram(s) IV Intermittent every 6 hours    MEDICATIONS  (PRN):      ALLERGIES:  Allergies    Allergy Status Unknown    Intolerances        LABS:                        10.2   4.71  )-----------( 162      ( 2022 05:15 )             27.9     01-08    146<H>  |  119<H>  |  9   ----------------------------<  257<H>  x    |  15<L>  |  0.90    Ca    7.9<L>      2022 05:15  Phos  2.5     01-08  Mg     1.7     -08    TPro  4.6<L>  /  Alb  3.1<L>  /  TBili  0.3  /  DBili  x   /  AST  25  /  ALT  19  /  AlkPhos  209<H>  01-08    PT/INR - ( 2022 10:12 )   PT: 10.5 sec;   INR: 0.87          PTT - ( 2022 10:12 )  PTT:29.9 sec  Urinalysis Basic - ( 2022 08:28 )    Color: Yellow / Appearance: Clear / S.020 / pH: x  Gluc: x / Ketone: >=80 mg/dL  / Bili: NEGATIVE / Urobili: 0.2 E.U./dL   Blood: x / Protein: 30 mg/dL / Nitrite: NEGATIVE   Leuk Esterase: NEGATIVE / RBC: < 5 /HPF / WBC < 5 /HPF   Sq Epi: x / Non Sq Epi: 0-5 /HPF / Bacteria: Present /HPF      CAPILLARY BLOOD GLUCOSE      POCT Blood Glucose.: 233 mg/dL (2022 04:58)      RADIOLOGY & ADDITIONAL TESTS: Reviewed. ***TRANSFER FROM [facility] to [facility]***  Hospital Course: Patient is a 20yo F w/ no PMH who presented to hospital after being found in kitchen screaming by friend and appeared weak. BIBEMS Patient was not able to give full history so history gathered from ED documentation and patient's mother. Patient did however report weight loss and increased urination frequency. As per ED: She is visiting [facility] and staying at friends home. Pts mother noted via phone that pt does not have PMH but has noted recent weight loss. Pt was found screaming in the kitchen at 6:40 am today.  Her friend was awoken from sleep and found pt with standing in kitchen with eyes closed. She quickly started to appear weak and was eased to the floor. EMS was called within 15 minutes. Upon EMS arrival, pt had a witnessed episode of n/v while laying supine.       INTERVAL EVENTS: JOHNATHAN    SUBJECTIVE / INTERVAL HPI: Patient seen and examined at bedside.     ROS: negative unless otherwise stated above.    VITAL SIGNS:  Vital Signs Last 24 Hrs  T(C): 38 (2022 00:50), Max: 38 (2022 00:50)  T(F): 100.4 (2022 00:50), Max: 100.4 (2022 00:50)  HR: 96 (2022 05:00) (96 - 135)  BP: 84/48 (2022 05:00) (82/52 - 120/81)  BP(mean): 60 (2022 05:00) (60 - 94)  RR: 14 (2022 05:00) (12 - 26)  SpO2: 100% (2022 05:00) (98% - 100%)      22 @ 07:01  -  22 @ 05:53  --------------------------------------------------------  IN: 6954.1 mL / OUT: 9485 mL / NET: -2530.9 mL        PHYSICAL EXAM:    General: NAD  HEENT: MMM  Neck: supple  Cardiovascular: +S1/S2; RRR  Respiratory: CTA B/L; no W/R/R  Gastrointestinal: soft, NT/ND  Extremities: WWP; no edema, clubbing or cyanosis  Vascular: 2+ radial, DP/PT pulses B/L  Neurological: AAOx3; no focal deficits    MEDICATIONS:  MEDICATIONS  (STANDING):  chlorhexidine 2% Cloths 1 Application(s) Topical <User Schedule>  dextrose 5% + sodium chloride 0.45%. 1000 milliLiter(s) (200 mL/Hr) IV Continuous <Continuous>  dextrose 50% Injectable 50 milliLiter(s) IV Push every 15 minutes  enoxaparin Injectable 40 milliGRAM(s) SubCutaneous every 24 hours  insulin regular Infusion 4 Unit(s)/Hr (4 mL/Hr) IV Continuous <Continuous>  magnesium sulfate  IVPB 2 Gram(s) IV Intermittent once  piperacillin/tazobactam IVPB.. 4.5 Gram(s) IV Intermittent every 6 hours    MEDICATIONS  (PRN):      ALLERGIES:  Allergies    Allergy Status Unknown    Intolerances        LABS:                        10.2   4.71  )-----------( 162      ( 2022 05:15 )             27.9     01-08    146<H>  |  119<H>  |  9   ----------------------------<  257<H>  x    |  15<L>  |  0.90    Ca    7.9<L>      2022 05:15  Phos  2.5     01-08  Mg     1.7     -08    TPro  4.6<L>  /  Alb  3.1<L>  /  TBili  0.3  /  DBili  x   /  AST  25  /  ALT  19  /  AlkPhos  209<H>  01-08    PT/INR - ( 2022 10:12 )   PT: 10.5 sec;   INR: 0.87          PTT - ( 2022 10:12 )  PTT:29.9 sec  Urinalysis Basic - ( 2022 08:28 )    Color: Yellow / Appearance: Clear / S.020 / pH: x  Gluc: x / Ketone: >=80 mg/dL  / Bili: NEGATIVE / Urobili: 0.2 E.U./dL   Blood: x / Protein: 30 mg/dL / Nitrite: NEGATIVE   Leuk Esterase: NEGATIVE / RBC: < 5 /HPF / WBC < 5 /HPF   Sq Epi: x / Non Sq Epi: 0-5 /HPF / Bacteria: Present /HPF      CAPILLARY BLOOD GLUCOSE      POCT Blood Glucose.: 233 mg/dL (2022 04:58)      RADIOLOGY & ADDITIONAL TESTS: Reviewed. ***TRANSFER FROM MICU to [facility]***  Hospital Course: Patient is a 22yo F w/ no PMH who presented to hospital after being found in kitchen screaming by friend and appeared weak. BIBEMS Patient was not able to give full history so history gathered from ED documentation and patient's mother. Patient did however report weight loss and increased urination frequency. As per ED: She is visiting [facility] and staying at friends home. Pts mother noted via phone that pt does not have PMH but has noted recent weight loss. Pt was found screaming in the kitchen at 6:40 am today.  Her friend was awoken from sleep and found pt with standing in kitchen with eyes closed. She quickly started to appear weak and was eased to the floor. EMS was called within 15 minutes. Upon EMS arrival, pt had a witnessed episode of n/v while laying supine. Patient started on heparin drip upon arrival. Given bicarb and aggressively replenished potassium. Repeat BMP, ABG showed gradual improvement while on insulin gtt. Mental status improved as well. Patient then transitioned from insulin gtt to basal/bolus. Tolerated PO and glucose controlled with AG closed x3. Plan for stepdown to [facility]. Endocrinology following.      INTERVAL EVENTS: JOHNATHAN    SUBJECTIVE / INTERVAL HPI: Patient seen and examined at bedside.     ROS: negative unless otherwise stated above.    VITAL SIGNS:  Vital Signs Last 24 Hrs  T(C): 38 (2022 00:50), Max: 38 (2022 00:50)  T(F): 100.4 (2022 00:50), Max: 100.4 (2022 00:50)  HR: 96 (2022 05:00) (96 - 135)  BP: 84/48 (2022 05:00) (82/52 - 120/81)  BP(mean): 60 (2022 05:00) (60 - 94)  RR: 14 (2022 05:00) (12 - 26)  SpO2: 100% (2022 05:00) (98% - 100%)      22 @ 07:01  -  22 @ 05:53  --------------------------------------------------------  IN: 6954.1 mL / OUT: 9485 mL / NET: -2530.9 mL        PHYSICAL EXAM:    General: NAD, sleeping comfortably  HEENT: MMM, no scleral icterus  Neck: supple  Cardiovascular: tachycardic, no MRG  Respiratory: CTA B/L; no W/R/R  Gastrointestinal: soft, NT/ND  Extremities: WWP; no edema, clubbing or cyanosis  Vascular: 2+ radial, DP pulses B/L  Neurological: AAOx3; no focal deficits    MEDICATIONS:  MEDICATIONS  (STANDING):  chlorhexidine 2% Cloths 1 Application(s) Topical <User Schedule>  dextrose 5% + sodium chloride 0.45%. 1000 milliLiter(s) (200 mL/Hr) IV Continuous <Continuous>  dextrose 50% Injectable 50 milliLiter(s) IV Push every 15 minutes  enoxaparin Injectable 40 milliGRAM(s) SubCutaneous every 24 hours  insulin regular Infusion 4 Unit(s)/Hr (4 mL/Hr) IV Continuous <Continuous>  magnesium sulfate  IVPB 2 Gram(s) IV Intermittent once  piperacillin/tazobactam IVPB.. 4.5 Gram(s) IV Intermittent every 6 hours    MEDICATIONS  (PRN):      ALLERGIES:  Allergies    Allergy Status Unknown    Intolerances        LABS:                        10.2   4.71  )-----------( 162      ( 2022 05:15 )             27.9     01-08    146<H>  |  119<H>  |  9   ----------------------------<  257<H>  x    |  15<L>  |  0.90    Ca    7.9<L>      2022 05:15  Phos  2.5     -08  Mg     1.7     -08    TPro  4.6<L>  /  Alb  3.1<L>  /  TBili  0.3  /  DBili  x   /  AST  25  /  ALT  19  /  AlkPhos  209<H>  01-08    PT/INR - ( 2022 10:12 )   PT: 10.5 sec;   INR: 0.87          PTT - ( 2022 10:12 )  PTT:29.9 sec  Urinalysis Basic - ( 2022 08:28 )    Color: Yellow / Appearance: Clear / S.020 / pH: x  Gluc: x / Ketone: >=80 mg/dL  / Bili: NEGATIVE / Urobili: 0.2 E.U./dL   Blood: x / Protein: 30 mg/dL / Nitrite: NEGATIVE   Leuk Esterase: NEGATIVE / RBC: < 5 /HPF / WBC < 5 /HPF   Sq Epi: x / Non Sq Epi: 0-5 /HPF / Bacteria: Present /HPF      CAPILLARY BLOOD GLUCOSE      POCT Blood Glucose.: 233 mg/dL (2022 04:58)      RADIOLOGY & ADDITIONAL TESTS: Reviewed.

## 2022-01-08 NOTE — PROGRESS NOTE ADULT - ASSESSMENT
DKA, new diabetes diagnosis, likely type 1.  Continue insulin drip until anion gap has closed, and now she has long acting on board.    continue glargine 12 units, she can get another dose tonight and then continue dosing at night.   Start lispro 5 units with meals.  Once glucose toxicity has resolved, I anticipate her insulin requirements will decrease significantly but for now, she will still require larger than expected doses (based on her weight).  Discussed potential complications of diabetes, diet changes she will need to make, glucose testing and insulin she will need to take.    Hypocalcemia.   PTH inappropriately low (43)  but maybe low due to recent calcium administration, if this was drawn after she received bolus of calcium.  Mg and phos are normal.   check 25OH vitamin D (may be pending).  will follow.  25 minutes (time spent)

## 2022-01-08 NOTE — PROGRESS NOTE ADULT - SUBJECTIVE AND OBJECTIVE BOX
INTERVAL HPI/OVERNIGHT EVENTS:    Patient is a 21y old  Female who presents with a chief complaint of DKA (08 Jan 2022 05:53)  Pt awake and alert, eating lunch.   had viral illness last January/February and since then started losing weight.  Baseline weight was 115 lb, then went down to 87 lb.  Was trying to eat more, to gain weight.  No polyuria, but very fatigued, HOWARD, couldn't do anything.  some nausea and blurry vision.  Her diet is not bad, doesn't drink sugared drinks, main indulgence is noodles, likes to eat more proteins.  Father has hypothyroidism.  no other autoimmune disease in family.    Insulin drip was held early this morning (around 5am) for hypokalemia, then anion gap increased.  insulin drip was restarted, and 12 units glargine also given.    MEDICATIONS  (STANDING):  chlorhexidine 2% Cloths 1 Application(s) Topical <User Schedule>  dextrose 40% Gel 15 Gram(s) Oral once  dextrose 5%. 1000 milliLiter(s) (50 mL/Hr) IV Continuous <Continuous>  dextrose 5%. 1000 milliLiter(s) (100 mL/Hr) IV Continuous <Continuous>  dextrose 50% Injectable 50 milliLiter(s) IV Push every 15 minutes  enoxaparin Injectable 30 milliGRAM(s) SubCutaneous every 24 hours  glucagon  Injectable 1 milliGRAM(s) IntraMuscular once  insulin glargine Injectable (LANTUS) 12 Unit(s) SubCutaneous at bedtime  insulin lispro (ADMELOG) corrective regimen sliding scale   SubCutaneous Before meals and at bedtime  insulin lispro Injectable (ADMELOG) 5 Unit(s) SubCutaneous three times a day before meals  insulin regular Infusion 4 Unit(s)/Hr (4 mL/Hr) IV Continuous <Continuous>  sodium bicarbonate 1300 milliGRAM(s) Oral every 8 hours    MEDICATIONS  (PRN):      PHYSICAL EXAM  Vital Signs Last 24 Hrs  T(C): 37.1 (08 Jan 2022 14:00), Max: 38 (08 Jan 2022 00:50)  T(F): 98.8 (08 Jan 2022 14:00), Max: 100.4 (08 Jan 2022 00:50)  HR: 94 (08 Jan 2022 14:00) (85 - 135)  BP: 92/59 (08 Jan 2022 14:00) (75/44 - 115/81)  BP(mean): 69 (08 Jan 2022 14:00) (55 - 94)  RR: 16 (08 Jan 2022 14:00) (11 - 27)  SpO2: 100% (08 Jan 2022 14:00) (98% - 100%)      LABS:                        10.2   4.71  )-----------( 162      ( 08 Jan 2022 05:15 )             27.9     01-08    139  |  113<H>  |  8   ----------------------------<  358<H>  4.5   |  10<LL>  |  0.83    Ca    7.9<L>      08 Jan 2022 11:00  Phos  3.0     01-08  Mg     2.5     01-08    TPro  4.6<L>  /  Alb  3.1<L>  /  TBili  0.3  /  DBili  x   /  AST  25  /  ALT  19  /  AlkPhos  209<H>  01-08    PT/INR - ( 07 Jan 2022 10:12 )   PT: 10.5 sec;   INR: 0.87          PTT - ( 07 Jan 2022 10:12 )  PTT:29.9 sec    Thyroid Stimulating Hormone, Serum: 2.066 uIU/mL (01-07 @ 07:58)      HbA1C:   CAPILLARY BLOOD GLUCOSE  POCT Blood Glucose.: 230 mg/dL (08 Jan 2022 14:17)  POCT Blood Glucose.: 269 mg/dL (08 Jan 2022 13:36)  POCT Blood Glucose.: 367 mg/dL (08 Jan 2022 12:12)  POCT Blood Glucose.: 334 mg/dL (08 Jan 2022 10:37)  POCT Blood Glucose.: 288 mg/dL (08 Jan 2022 09:23)  POCT Blood Glucose.: 221 mg/dL (08 Jan 2022 07:55)  POCT Blood Glucose.: 189 mg/dL (08 Jan 2022 07:01)  POCT Blood Glucose.: 199 mg/dL (08 Jan 2022 05:57)  POCT Blood Glucose.: 233 mg/dL (08 Jan 2022 04:58)  POCT Blood Glucose.: 260 mg/dL (08 Jan 2022 03:58)  POCT Blood Glucose.: 284 mg/dL (08 Jan 2022 03:07)  POCT Blood Glucose.: 324 mg/dL (08 Jan 2022 02:06)  POCT Blood Glucose.: 259 mg/dL (08 Jan 2022 00:59)  POCT Blood Glucose.: 209 mg/dL (07 Jan 2022 23:58)  POCT Blood Glucose.: 221 mg/dL (07 Jan 2022 23:01)  POCT Blood Glucose.: 267 mg/dL (07 Jan 2022 22:03)  POCT Blood Glucose.: 377 mg/dL (07 Jan 2022 21:02)  POCT Blood Glucose.: 357 mg/dL (07 Jan 2022 20:04)  POCT Blood Glucose.: 362 mg/dL (07 Jan 2022 19:10)  POCT Blood Glucose.: 409 mg/dL (07 Jan 2022 18:12)  POCT Blood Glucose.: 478 mg/dL (07 Jan 2022 17:16)  POCT Blood Glucose.: 492 mg/dL (07 Jan 2022 16:04)

## 2022-01-08 NOTE — PROGRESS NOTE ADULT - SUBJECTIVE AND OBJECTIVE BOX
CC: Patient is a 21y old  Female who presents with a chief complaint of DKA     ***TRANSFER FROM MICU TO [facility]***    HOSPITAL COURSE:  22 yo previously healthy female BIBEMS after being found altered and weak at a friends apartment. Patient is visiting a friend from [place] and reports weight loss and increased urinary frequency x6 months - 1 year. On the AM of 2022 the patients friend found her weak and altered in the kitchen and called EMS. On admission the patient was found to to be in DKA with the following values: , arterial pH 6.93, AG >26, A1c of 20 and BHB of 8. The patient was admitted to the [facility] and started on DKA protocol, which included fluids, insulin gtt and bicarb given arterial pH less than 7. The patient also had electrolyte disturbances that have since resolved following repletion prn. Her anion gap cis now closed for 2+ blood chemistries. She has transitioned to SubQ insulin and will begin a regimen of 12U long acting at bedtime with 5U short acting TID. Her latest ABG on the AM of the  still shows acidosis, a non-AG metabolic acidosis, so the patient will continue Bicarb tablets for another 24-48 hours. On exam, her AMS has greatly improved and she is now AOx3 and only slightly groggy.       INTERVAL EVENTS: JOHNATHAN    SUBJECTIVE / INTERVAL HPI: Patient seen and examined at bedside.     ROS: negative unless otherwise stated above.    VITAL SIGNS:  Vital Signs Last 24 Hrs  T(C): 36.6 (2022 21:08), Max: 38 (2022 00:50)  T(F): 97.8 (2022 21:08), Max: 100.4 (2022 00:50)  HR: 98 (2022 21:08) (85 - 118)  BP: 98/58 (2022 21:08) (75/44 - 108/71)  BP(mean): 70 (2022 20:00) (55 - 85)  RR: 20 (2022 21:08) (11 - 27)  SpO2: 99% (2022 21:08) (98% - 100%)      22 @ 07:01  -  22 @ 07:00  --------------------------------------------------------  IN: 7354.1 mL / OUT: 9715 mL / NET: -2360.9 mL    22 @ 07:01  -  22 @ 22:28  --------------------------------------------------------  IN: 1502.3 mL / OUT: 750 mL / NET: 752.3 mL        PHYSICAL EXAM:    General: NAD  HEENT: MMM  Neck: supple  Cardiovascular: +S1/S2; RRR  Respiratory: CTA B/L; no W/R/R  Gastrointestinal: soft, NT/ND  Extremities: WWP; no edema, clubbing or cyanosis  Vascular: 2+ radial, DP/PT pulses B/L  Neurological: AAOx3; moves all extremities on command     MEDICATIONS:  MEDICATIONS  (STANDING):  chlorhexidine 2% Cloths 1 Application(s) Topical <User Schedule>  dextrose 40% Gel 15 Gram(s) Oral once  dextrose 5%. 1000 milliLiter(s) (50 mL/Hr) IV Continuous <Continuous>  dextrose 5%. 1000 milliLiter(s) (100 mL/Hr) IV Continuous <Continuous>  dextrose 50% Injectable 50 milliLiter(s) IV Push every 15 minutes  enoxaparin Injectable 30 milliGRAM(s) SubCutaneous every 24 hours  glucagon  Injectable 1 milliGRAM(s) IntraMuscular once  insulin glargine Injectable (LANTUS) 12 Unit(s) SubCutaneous at bedtime  insulin lispro (ADMELOG) corrective regimen sliding scale   SubCutaneous Before meals and at bedtime  insulin lispro Injectable (ADMELOG) 5 Unit(s) SubCutaneous three times a day before meals  sodium bicarbonate 1300 milliGRAM(s) Oral every 8 hours    MEDICATIONS  (PRN):      ALLERGIES:  Allergies    Allergy Status Unknown    Intolerances        LABS:                        10.2   4.71  )-----------( 162      ( 2022 05:15 )             27.9     08    137  |  113<H>  |  7   ----------------------------<  225<H>  3.8   |  14<L>  |  1.04    Ca    7.2<L>      2022 14:44  Phos  3.0     -  Mg     1.9         TPro  4.6<L>  /  Alb  3.1<L>  /  TBili  0.3  /  DBili  x   /  AST  25  /  ALT  19  /  AlkPhos  209<H>  -08    PT/INR - ( 2022 10:12 )   PT: 10.5 sec;   INR: 0.87          PTT - ( 2022 10:12 )  PTT:29.9 sec  Urinalysis Basic - ( 2022 08:28 )    Color: Yellow / Appearance: Clear / S.020 / pH: x  Gluc: x / Ketone: >=80 mg/dL  / Bili: NEGATIVE / Urobili: 0.2 E.U./dL   Blood: x / Protein: 30 mg/dL / Nitrite: NEGATIVE   Leuk Esterase: NEGATIVE / RBC: < 5 /HPF / WBC < 5 /HPF   Sq Epi: x / Non Sq Epi: 0-5 /HPF / Bacteria: Present /HPF      CAPILLARY BLOOD GLUCOSE      POCT Blood Glucose.: 357 mg/dL (2022 22:01)      RADIOLOGY & ADDITIONAL TESTS: Reviewed. CC: Patient is a 21y old  Female who presents with a chief complaint of DKA     ***TRANSFER FROM MICU TO [email or web address]***    HOSPITAL COURSE:  20 yo previously healthy female BIBEMS after being found altered and weak at a friends apartment. Patient is visiting a friend from [place] and reports weight loss and increased urinary frequency x6 months - 1 year. On the AM of 2022 the patients friend found her weak and altered in the kitchen and called EMS. On admission the patient was found to to be in DKA with the following values: , arterial pH 6.93, AG >26, A1c of 20 and BHB of 8. The patient was admitted to the [facility] and started on DKA protocol, which included fluids, insulin gtt and bicarb given arterial pH less than 7. The patient also had electrolyte disturbances that have since resolved following repletion prn. Her anion gap cis now closed for 2+ blood chemistries. She has transitioned to SubQ insulin and will begin a regimen of 12U long acting at bedtime with 5U short acting TID. Her latest ABG on the AM of the  still shows acidosis, a non-AG metabolic acidosis, so the patient will continue Bicarb tablets for another 24-48 hours. On exam, her AMS has greatly improved and she is now AOx3 and only slightly groggy.       INTERVAL EVENTS: JOHNATHAN    SUBJECTIVE / INTERVAL HPI: Patient seen and examined at bedside.     ROS: negative unless otherwise stated above.    VITAL SIGNS:  Vital Signs Last 24 Hrs  T(C): 36.6 (2022 21:08), Max: 38 (2022 00:50)  T(F): 97.8 (2022 21:08), Max: 100.4 (2022 00:50)  HR: 98 (2022 21:08) (85 - 118)  BP: 98/58 (2022 21:08) (75/44 - 108/71)  BP(mean): 70 (2022 20:00) (55 - 85)  RR: 20 (2022 21:08) (11 - 27)  SpO2: 99% (2022 21:08) (98% - 100%)      22 @ 07:01  -  22 @ 07:00  --------------------------------------------------------  IN: 7354.1 mL / OUT: 9715 mL / NET: -2360.9 mL    22 @ 07:01  -  22 @ 22:28  --------------------------------------------------------  IN: 1502.3 mL / OUT: 750 mL / NET: 752.3 mL        PHYSICAL EXAM:    General: NAD  HEENT: MMM  Neck: supple  Cardiovascular: +S1/S2; RRR  Respiratory: CTA B/L; no W/R/R  Gastrointestinal: soft, NT/ND  Extremities: WWP; no edema, clubbing or cyanosis  Vascular: 2+ radial, DP/PT pulses B/L  Neurological: AAOx3; moves all extremities on command     MEDICATIONS:  MEDICATIONS  (STANDING):  chlorhexidine 2% Cloths 1 Application(s) Topical <User Schedule>  dextrose 40% Gel 15 Gram(s) Oral once  dextrose 5%. 1000 milliLiter(s) (50 mL/Hr) IV Continuous <Continuous>  dextrose 5%. 1000 milliLiter(s) (100 mL/Hr) IV Continuous <Continuous>  dextrose 50% Injectable 50 milliLiter(s) IV Push every 15 minutes  enoxaparin Injectable 30 milliGRAM(s) SubCutaneous every 24 hours  glucagon  Injectable 1 milliGRAM(s) IntraMuscular once  insulin glargine Injectable (LANTUS) 12 Unit(s) SubCutaneous at bedtime  insulin lispro (ADMELOG) corrective regimen sliding scale   SubCutaneous Before meals and at bedtime  insulin lispro Injectable (ADMELOG) 5 Unit(s) SubCutaneous three times a day before meals  sodium bicarbonate 1300 milliGRAM(s) Oral every 8 hours    MEDICATIONS  (PRN):      ALLERGIES:  Allergies    Allergy Status Unknown    Intolerances        LABS:                        10.2   4.71  )-----------( 162      ( 2022 05:15 )             27.9     08    137  |  113<H>  |  7   ----------------------------<  225<H>  3.8   |  14<L>  |  1.04    Ca    7.2<L>      2022 14:44  Phos  3.0     -  Mg     1.9         TPro  4.6<L>  /  Alb  3.1<L>  /  TBili  0.3  /  DBili  x   /  AST  25  /  ALT  19  /  AlkPhos  209<H>  -08    PT/INR - ( 2022 10:12 )   PT: 10.5 sec;   INR: 0.87          PTT - ( 2022 10:12 )  PTT:29.9 sec  Urinalysis Basic - ( 2022 08:28 )    Color: Yellow / Appearance: Clear / S.020 / pH: x  Gluc: x / Ketone: >=80 mg/dL  / Bili: NEGATIVE / Urobili: 0.2 E.U./dL   Blood: x / Protein: 30 mg/dL / Nitrite: NEGATIVE   Leuk Esterase: NEGATIVE / RBC: < 5 /HPF / WBC < 5 /HPF   Sq Epi: x / Non Sq Epi: 0-5 /HPF / Bacteria: Present /HPF      CAPILLARY BLOOD GLUCOSE      POCT Blood Glucose.: 357 mg/dL (2022 22:01)      RADIOLOGY & ADDITIONAL TESTS: Reviewed.

## 2022-01-08 NOTE — PROGRESS NOTE ADULT - PROBLEM SELECTOR PLAN 2
- 2/2 metabolic acidosis in the setting of DKA  - greatly improved on exam today #hypokalemia - resolved   - monitor BMP closely    #hypocalcemia - resolved   Ca low upon arrival.   - s/p Ca gluconate, PTH low but drawn s/p calcium administration   - endocrinology following

## 2022-01-08 NOTE — PROGRESS NOTE ADULT - ASSESSMENT
Patient is a 20yo F w/ no PMH who presented to hospital after being found in kitchen screaming by friend and appeared weak. BIBEMS and patient found to be in DKA and with AMS. Admitted to MICU for management of DKA.    Neuro  #metabolic encephalopathy  - AAOx1  - 2/2 metabolic acidosis in the setting of DKA  - Monitor mental status    Cardiac  #tachycardia  EKG w/ Sinus tachycardia. Likely 2/2 hypovolemia in the setting of DKA, polyuria.  - give fluids as appropriate    Pulm  No acute issues.  CXR wnl.    Endo/Metabolic  #DKA  #T1DM  Presented in DKA. No known hx of diabetes. Likely new diagnosis of T1DM.  - Endocrinology following  - admit to MICU for insulin gtt protocol  - fluid ruscusitate and replenish electrolytes aggressively   - bicarb gtt for acidosis, monitor K  - change to dextrose containing fluid when glu<250 as per protocol  - c/w insulin gtt until AG closed x2  - f/u Diabetes antibodies    #hypokalemia  K low and at risk for further hypokalemia with insulin gtt  - monitor BMP closely    #hypocalcemia  Ca low upon arrival.   - Ca gluconate  - f/u vitD25, PTH lvls    Renal  Polyuria in the setting of DKA. UA without infection, but with ketonuria and glucosuria.  - monitor fluid status, fluid resuscitate as appropriate.    GI  With NG tube.  Monitor BM's.    ID  #r/o infection  Unclear if infection incited DKA.  - start zosyn  - start vanc    Heme  No acute issues.      F: resuscitate generously  E: replenish aggressively  N: NPO    VTE Prophylaxis: Lovenox 40mg q24h  GI: not needed  C: Full Code  D: MICU   Patient is a 22yo F w/ no PMH who presented to hospital after being found in kitchen screaming by friend and appeared weak. BIBEMS and patient found to be in DKA and with AMS. Admitted to MICU for management of DKA.    Neuro  #metabolic encephalopathy  - AAOx3  - 2/2 metabolic acidosis in the setting of DKA  - Monitor mental status    Cardiac  #tachycardia  EKG w/ Sinus tachycardia. Likely 2/2 hypovolemia in the setting of DKA, polyuria.  - give fluids as appropriate    Pulm  No acute issues.  CXR wnl.    Endo/Metabolic  #DKA  #T1DM  Presented in DKA. No known hx of diabetes. Likely new diagnosis of T1DM.  - Endocrinology following  - admitted to MICU for insulin gtt protocol  - fluid ruscusitate and replenish electrolytes aggressively   - bicarb tabs for non-AG met acidosis, monitor K  - transition from insulin gtt to basal/bolus  - f/u Diabetes antibodies    #hypokalemia  K low and at risk for further hypokalemia with insulin gtt  - monitor BMP closely    #hypocalcemia  Ca low upon arrival. s/p Ca gluconate  - f/u vitD25, PTH lvls    Renal  Polyuria in the setting of DKA. UA without infection, but with ketonuria and glucosuria.  - monitor fluid status, fluid resuscitate as appropriate.    GI  With NG tube.  Monitor BM's.  - plan to remove NG tube    ID  #r/o infection  Unclear if infection incited DKA.  - c/w zosyn  - c/w vanc    Heme  No acute issues.      F: resuscitate generously  E: replenish aggressively  N: NPO    VTE Prophylaxis: Lovenox 40mg q24h  GI: not needed  C: Full Code  D: MICU

## 2022-01-08 NOTE — PROGRESS NOTE ADULT - PROBLEM SELECTOR PLAN 3
#hypokalemia - resolved   - monitor BMP closely    #hypocalcemia - resolved   Ca low upon arrival.   - s/p Ca gluconate  - endocrinology following #hypokalemia - resolved   - monitor BMP closely    #hypocalcemia - resolved   Ca low upon arrival.   - s/p Ca gluconate, PTH low but drawn s/p calcium administration   - endocrinology following F: PO  E: replenish aggressively  N: NPO  VTE Prophylaxis: none, patient mobile   GI: not needed  C: Full Code  D: home

## 2022-01-09 LAB
ALBUMIN SERPL ELPH-MCNC: 3 G/DL — LOW (ref 3.3–5)
ALP SERPL-CCNC: 164 U/L — HIGH (ref 40–120)
ALT FLD-CCNC: 18 U/L — SIGNIFICANT CHANGE UP (ref 10–45)
ANION GAP SERPL CALC-SCNC: 12 MMOL/L — SIGNIFICANT CHANGE UP (ref 5–17)
AST SERPL-CCNC: 20 U/L — SIGNIFICANT CHANGE UP (ref 10–40)
BASOPHILS # BLD AUTO: 0.02 K/UL — SIGNIFICANT CHANGE UP (ref 0–0.2)
BASOPHILS NFR BLD AUTO: 0.4 % — SIGNIFICANT CHANGE UP (ref 0–2)
BILIRUB SERPL-MCNC: 0.4 MG/DL — SIGNIFICANT CHANGE UP (ref 0.2–1.2)
BUN SERPL-MCNC: 11 MG/DL — SIGNIFICANT CHANGE UP (ref 7–23)
CALCIUM SERPL-MCNC: 8.3 MG/DL — LOW (ref 8.4–10.5)
CHLORIDE SERPL-SCNC: 111 MMOL/L — HIGH (ref 96–108)
CO2 SERPL-SCNC: 19 MMOL/L — LOW (ref 22–31)
CREAT SERPL-MCNC: 0.82 MG/DL — SIGNIFICANT CHANGE UP (ref 0.5–1.3)
EOSINOPHIL # BLD AUTO: 0.03 K/UL — SIGNIFICANT CHANGE UP (ref 0–0.5)
EOSINOPHIL NFR BLD AUTO: 0.6 % — SIGNIFICANT CHANGE UP (ref 0–6)
GLUCOSE BLDC GLUCOMTR-MCNC: 135 MG/DL — HIGH (ref 70–99)
GLUCOSE BLDC GLUCOMTR-MCNC: 173 MG/DL — HIGH (ref 70–99)
GLUCOSE BLDC GLUCOMTR-MCNC: 339 MG/DL — HIGH (ref 70–99)
GLUCOSE BLDC GLUCOMTR-MCNC: 380 MG/DL — HIGH (ref 70–99)
GLUCOSE BLDC GLUCOMTR-MCNC: 416 MG/DL — HIGH (ref 70–99)
GLUCOSE SERPL-MCNC: 123 MG/DL — HIGH (ref 70–99)
HCT VFR BLD CALC: 28.6 % — LOW (ref 34.5–45)
HGB BLD-MCNC: 10 G/DL — LOW (ref 11.5–15.5)
IMM GRANULOCYTES NFR BLD AUTO: 0.6 % — SIGNIFICANT CHANGE UP (ref 0–1.5)
LYMPHOCYTES # BLD AUTO: 1.94 K/UL — SIGNIFICANT CHANGE UP (ref 1–3.3)
LYMPHOCYTES # BLD AUTO: 35.9 % — SIGNIFICANT CHANGE UP (ref 13–44)
MAGNESIUM SERPL-MCNC: 2.1 MG/DL — SIGNIFICANT CHANGE UP (ref 1.6–2.6)
MCHC RBC-ENTMCNC: 27.9 PG — SIGNIFICANT CHANGE UP (ref 27–34)
MCHC RBC-ENTMCNC: 35 GM/DL — SIGNIFICANT CHANGE UP (ref 32–36)
MCV RBC AUTO: 79.9 FL — LOW (ref 80–100)
MONOCYTES # BLD AUTO: 0.54 K/UL — SIGNIFICANT CHANGE UP (ref 0–0.9)
MONOCYTES NFR BLD AUTO: 10 % — SIGNIFICANT CHANGE UP (ref 2–14)
NEUTROPHILS # BLD AUTO: 2.85 K/UL — SIGNIFICANT CHANGE UP (ref 1.8–7.4)
NEUTROPHILS NFR BLD AUTO: 52.5 % — SIGNIFICANT CHANGE UP (ref 43–77)
NRBC # BLD: 0 /100 WBCS — SIGNIFICANT CHANGE UP (ref 0–0)
PHOSPHATE SERPL-MCNC: 2.3 MG/DL — LOW (ref 2.5–4.5)
PLATELET # BLD AUTO: 148 K/UL — LOW (ref 150–400)
POTASSIUM SERPL-MCNC: 3.1 MMOL/L — LOW (ref 3.5–5.3)
POTASSIUM SERPL-SCNC: 3.1 MMOL/L — LOW (ref 3.5–5.3)
PROT SERPL-MCNC: 4.6 G/DL — LOW (ref 6–8.3)
RBC # BLD: 3.58 M/UL — LOW (ref 3.8–5.2)
RBC # FLD: 14.1 % — SIGNIFICANT CHANGE UP (ref 10.3–14.5)
SODIUM SERPL-SCNC: 142 MMOL/L — SIGNIFICANT CHANGE UP (ref 135–145)
WBC # BLD: 5.41 K/UL — SIGNIFICANT CHANGE UP (ref 3.8–10.5)
WBC # FLD AUTO: 5.41 K/UL — SIGNIFICANT CHANGE UP (ref 3.8–10.5)

## 2022-01-09 PROCEDURE — 99231 SBSQ HOSP IP/OBS SF/LOW 25: CPT

## 2022-01-09 PROCEDURE — 99232 SBSQ HOSP IP/OBS MODERATE 35: CPT | Mod: GC

## 2022-01-09 RX ORDER — POTASSIUM CHLORIDE 20 MEQ
40 PACKET (EA) ORAL EVERY 4 HOURS
Refills: 0 | Status: COMPLETED | OUTPATIENT
Start: 2022-01-09 | End: 2022-01-09

## 2022-01-09 RX ORDER — POTASSIUM PHOSPHATE, MONOBASIC POTASSIUM PHOSPHATE, DIBASIC 236; 224 MG/ML; MG/ML
15 INJECTION, SOLUTION INTRAVENOUS ONCE
Refills: 0 | Status: COMPLETED | OUTPATIENT
Start: 2022-01-09 | End: 2022-01-09

## 2022-01-09 RX ORDER — MAGNESIUM SULFATE 500 MG/ML
2 VIAL (ML) INJECTION ONCE
Refills: 0 | Status: COMPLETED | OUTPATIENT
Start: 2022-01-09 | End: 2022-01-09

## 2022-01-09 RX ORDER — POTASSIUM CHLORIDE 20 MEQ
40 PACKET (EA) ORAL ONCE
Refills: 0 | Status: COMPLETED | OUTPATIENT
Start: 2022-01-09 | End: 2022-01-09

## 2022-01-09 RX ADMIN — Medication 8: at 21:37

## 2022-01-09 RX ADMIN — Medication 1300 MILLIGRAM(S): at 13:26

## 2022-01-09 RX ADMIN — Medication 5 UNIT(S): at 17:42

## 2022-01-09 RX ADMIN — Medication 5 UNIT(S): at 12:34

## 2022-01-09 RX ADMIN — Medication 40 MILLIEQUIVALENT(S): at 09:35

## 2022-01-09 RX ADMIN — Medication 1300 MILLIGRAM(S): at 21:38

## 2022-01-09 RX ADMIN — INSULIN GLARGINE 12 UNIT(S): 100 INJECTION, SOLUTION SUBCUTANEOUS at 21:38

## 2022-01-09 RX ADMIN — POTASSIUM PHOSPHATE, MONOBASIC POTASSIUM PHOSPHATE, DIBASIC 62.5 MILLIMOLE(S): 236; 224 INJECTION, SOLUTION INTRAVENOUS at 11:44

## 2022-01-09 RX ADMIN — Medication 1300 MILLIGRAM(S): at 05:57

## 2022-01-09 RX ADMIN — CHLORHEXIDINE GLUCONATE 1 APPLICATION(S): 213 SOLUTION TOPICAL at 05:58

## 2022-01-09 RX ADMIN — Medication 2: at 12:34

## 2022-01-09 RX ADMIN — Medication 40 MILLIEQUIVALENT(S): at 12:45

## 2022-01-09 RX ADMIN — Medication 25 GRAM(S): at 09:35

## 2022-01-09 RX ADMIN — Medication 12: at 17:42

## 2022-01-09 RX ADMIN — Medication 5 UNIT(S): at 09:34

## 2022-01-09 RX ADMIN — Medication 40 MILLIEQUIVALENT(S): at 17:26

## 2022-01-09 NOTE — PROVIDER CONTACT NOTE (OTHER) - SITUATION
finger stick initially was 416 after 17units of insulin coverage pt had light dinner finger stick 380 at this time pt denies any complaints  pt asymptomatic

## 2022-01-09 NOTE — PROGRESS NOTE ADULT - ASSESSMENT
22 yo previously healthy female BIBEMS after being found altered and weak at a friends apartment found to be newly diagnosed DM1 in DKA, anion gap closed and transitioned to sub-Q insulin now on RMF.

## 2022-01-09 NOTE — PROGRESS NOTE ADULT - SUBJECTIVE AND OBJECTIVE BOX
INTERVAL HPI/OVERNIGHT EVENTS: JOHNATHAN o/n. Resting comfortably in bed this AM. Tolerating meals w/o difficulty. No nausea or vomiting. She is apprehensive about using insulin at home and surprised at her hospitalization/new diagnosis of DM. No nausea, vomiting, fever, chills.     VITAL SIGNS:  T(F): 97.8 (01-09-22 @ 05:26)  HR: 83 (01-09-22 @ 05:26)  BP: 92/61 (01-09-22 @ 05:26)  RR: 17 (01-09-22 @ 05:26)  SpO2: 98% (01-09-22 @ 05:26)  Wt(kg): --    PHYSICAL EXAM:      Constitutional: NAD, well-groomed, well-developed  HEENT: PERRLA, EOMI, Normal Hearing, MMM  Neck: No LAD, No JVD  Back: Normal spine flexure, No CVA tenderness  Respiratory: CTAB  Cardiovascular: S1 and S2, RRR, no M/G/R  Gastrointestinal: BS+, soft, NT/ND  Extremities: No peripheral edema  Vascular: 2+ peripheral pulses  Neurological: A/O x 3, no focal deficits  Psychiatric: Normal mood, normal affect  Musculoskeletal: 5/5 strength b/l upper and lower extremities  Skin: No rashes        MEDICATIONS  (STANDING):  chlorhexidine 2% Cloths 1 Application(s) Topical <User Schedule>  dextrose 40% Gel 15 Gram(s) Oral once  dextrose 5%. 1000 milliLiter(s) (50 mL/Hr) IV Continuous <Continuous>  dextrose 5%. 1000 milliLiter(s) (100 mL/Hr) IV Continuous <Continuous>  dextrose 50% Injectable 50 milliLiter(s) IV Push every 15 minutes  glucagon  Injectable 1 milliGRAM(s) IntraMuscular once  insulin glargine Injectable (LANTUS) 12 Unit(s) SubCutaneous at bedtime  insulin lispro (ADMELOG) corrective regimen sliding scale   SubCutaneous Before meals and at bedtime  insulin lispro Injectable (ADMELOG) 5 Unit(s) SubCutaneous three times a day before meals  potassium chloride    Tablet ER 40 milliEquivalent(s) Oral every 4 hours  sodium bicarbonate 1300 milliGRAM(s) Oral every 8 hours    MEDICATIONS  (PRN):      Allergies    Allergy Status Unknown    Intolerances        LABS:                        10.0   5.41  )-----------( 148      ( 09 Jan 2022 07:05 )             28.6     01-09    142  |  111<H>  |  11  ----------------------------<  123<H>  3.1<L>   |  19<L>  |  0.82    Ca    8.3<L>      09 Jan 2022 07:05  Phos  2.3     01-09  Mg     2.1     01-09    TPro  4.6<L>  /  Alb  3.0<L>  /  TBili  0.4  /  DBili  x   /  AST  20  /  ALT  18  /  AlkPhos  164<H>  01-09          RADIOLOGY & ADDITIONAL TESTS:

## 2022-01-09 NOTE — PROGRESS NOTE ADULT - PROBLEM SELECTOR PLAN 2
#hypokalemia - resolved   - monitor BMP closely    #hypocalcemia - resolved   Ca low upon arrival.   - s/p Ca gluconate, PTH low but drawn s/p calcium administration   - endocrinology following

## 2022-01-09 NOTE — PROGRESS NOTE ADULT - ASSESSMENT
20 yo previously healthy female BIBEMS after being found altered and weak at a friends apartment found to be newly diagnosed DM1 in DKA, anion gap closed and transitioned to sub-Q insulin now on RMF.

## 2022-01-09 NOTE — PROGRESS NOTE ADULT - PROBLEM SELECTOR PLAN 3
F: PO  E: replenish aggressively  N: Consistent carb  VTE Prophylaxis: none, patient mobile   GI: not needed  C: Full Code  D: home

## 2022-01-09 NOTE — PROVIDER CONTACT NOTE (OTHER) - ACTION/TREATMENT ORDERED:
correctional insulin 12 units given plus 5 units of pre meal as ordered  will recheck , educated pt on reporting any symptoms, pt verbalized understanding

## 2022-01-09 NOTE — PROGRESS NOTE ADULT - SUBJECTIVE AND OBJECTIVE BOX
INTERVAL HPI/OVERNIGHT EVENTS:    Patient is a 21y old  Female who presents with a chief complaint of DKA (09 Jan 2022 12:00)  Pt feeling much better, sitting in bed, appetite is good.  currently under her parents' medical insurance plan but will be starting new job in a few weeks in [place]  Sugars today in a good range.  very low vitamin D,  25D 7.8 ng/ml    MEDICATIONS  (STANDING):  chlorhexidine 2% Cloths 1 Application(s) Topical <User Schedule>  dextrose 40% Gel 15 Gram(s) Oral once  dextrose 5%. 1000 milliLiter(s) (50 mL/Hr) IV Continuous <Continuous>  dextrose 5%. 1000 milliLiter(s) (100 mL/Hr) IV Continuous <Continuous>  dextrose 50% Injectable 50 milliLiter(s) IV Push every 15 minutes  glucagon  Injectable 1 milliGRAM(s) IntraMuscular once  insulin glargine Injectable (LANTUS) 12 Unit(s) SubCutaneous at bedtime  insulin lispro (ADMELOG) corrective regimen sliding scale   SubCutaneous Before meals and at bedtime  insulin lispro Injectable (ADMELOG) 5 Unit(s) SubCutaneous three times a day before meals  potassium chloride    Tablet ER 40 milliEquivalent(s) Oral every 4 hours  sodium bicarbonate 1300 milliGRAM(s) Oral every 8 hours    MEDICATIONS  (PRN):      PHYSICAL EXAM  Vital Signs Last 24 Hrs  T(C): 36.4 (09 Jan 2022 12:46), Max: 37.1 (08 Jan 2022 14:00)  T(F): 97.6 (09 Jan 2022 12:46), Max: 98.8 (08 Jan 2022 14:00)  HR: 82 (09 Jan 2022 12:46) (82 - 118)  BP: 96/62 (09 Jan 2022 12:46) (91/52 - 102/63)  BP(mean): 70 (08 Jan 2022 20:00) (66 - 70)  RR: 14 (09 Jan 2022 12:46) (14 - 21)  SpO2: 100% (09 Jan 2022 12:46) (98% - 100%)      LABS:                        10.0   5.41  )-----------( 148      ( 09 Jan 2022 07:05 )             28.6     01-09    142  |  111<H>  |  11  ----------------------------<  123<H>  3.1<L>   |  19<L>  |  0.82    Ca    8.3<L>      09 Jan 2022 07:05  Phos  2.3     01-09  Mg     2.1     01-09    TPro  4.6<L>  /  Alb  3.0<L>  /  TBili  0.4  /  DBili  x   /  AST  20  /  ALT  18  /  AlkPhos  164<H>  01-09        Thyroid Stimulating Hormone, Serum: 2.066 uIU/mL (01-07 @ 07:58)      HbA1C:   CAPILLARY BLOOD GLUCOSE      POCT Blood Glucose.: 173 mg/dL (09 Jan 2022 12:17)  POCT Blood Glucose.: 135 mg/dL (09 Jan 2022 09:20)  POCT Blood Glucose.: 357 mg/dL (08 Jan 2022 22:01)  POCT Blood Glucose.: 209 mg/dL (08 Jan 2022 16:52)  POCT Blood Glucose.: 221 mg/dL (08 Jan 2022 15:08)  POCT Blood Glucose.: 230 mg/dL (08 Jan 2022 14:17)

## 2022-01-09 NOTE — PROGRESS NOTE ADULT - SUBJECTIVE AND OBJECTIVE BOX
OVERNIGHT EVENTS: NAEON    SUBJECTIVE / INTERVAL HPI: Patient seen and examined at bedside. Denied headache, increased urinary frequency, nausea    VITAL SIGNS:  Vital Signs Last 24 Hrs  T(C): 36.6 (09 Jan 2022 05:26), Max: 37.1 (08 Jan 2022 14:00)  T(F): 97.8 (09 Jan 2022 05:26), Max: 98.8 (08 Jan 2022 14:00)  HR: 83 (09 Jan 2022 05:26) (83 - 118)  BP: 92/61 (09 Jan 2022 05:26) (89/59 - 102/63)  BP(mean): 70 (08 Jan 2022 20:00) (66 - 71)  RR: 17 (09 Jan 2022 05:26) (15 - 27)  SpO2: 98% (09 Jan 2022 05:26) (98% - 100%)    PHYSICAL EXAM:    General: Pleasant female NAD  HEENT: NC/AT; PERRL, anicteric sclera; MMM  Neck: supple  Cardiovascular: +S1/S2, RRR, no murmurs, rubs, gallops  Respiratory: CTA B/L; no W/R/R  Gastrointestinal: soft, NT/ND; +BSx4  Extremities: WWP; no edema, clubbing or cyanosis  Vascular: 2+ radial, DP/PT pulses B/L  Neurological: AAOx3; no focal deficits. 5/5 UE 5/5 LE    MEDICATIONS:  MEDICATIONS  (STANDING):  chlorhexidine 2% Cloths 1 Application(s) Topical <User Schedule>  dextrose 40% Gel 15 Gram(s) Oral once  dextrose 5%. 1000 milliLiter(s) (50 mL/Hr) IV Continuous <Continuous>  dextrose 5%. 1000 milliLiter(s) (100 mL/Hr) IV Continuous <Continuous>  dextrose 50% Injectable 50 milliLiter(s) IV Push every 15 minutes  glucagon  Injectable 1 milliGRAM(s) IntraMuscular once  insulin glargine Injectable (LANTUS) 12 Unit(s) SubCutaneous at bedtime  insulin lispro (ADMELOG) corrective regimen sliding scale   SubCutaneous Before meals and at bedtime  insulin lispro Injectable (ADMELOG) 5 Unit(s) SubCutaneous three times a day before meals  potassium chloride    Tablet ER 40 milliEquivalent(s) Oral every 4 hours  sodium bicarbonate 1300 milliGRAM(s) Oral every 8 hours    MEDICATIONS  (PRN):      ALLERGIES:  Allergies    Allergy Status Unknown    Intolerances        LABS:                        10.0   5.41  )-----------( 148      ( 09 Jan 2022 07:05 )             28.6     01-09    142  |  111<H>  |  11  ----------------------------<  123<H>  3.1<L>   |  19<L>  |  0.82    Ca    8.3<L>      09 Jan 2022 07:05  Phos  2.3     01-09  Mg     2.1     01-09    TPro  4.6<L>  /  Alb  3.0<L>  /  TBili  0.4  /  DBili  x   /  AST  20  /  ALT  18  /  AlkPhos  164<H>  01-09        CAPILLARY BLOOD GLUCOSE      POCT Blood Glucose.: 135 mg/dL (09 Jan 2022 09:20)      RADIOLOGY & ADDITIONAL TESTS: Reviewed.    PLAN:

## 2022-01-09 NOTE — PROGRESS NOTE ADULT - PROBLEM SELECTOR PLAN 2
No #hypokalemia   - replete aggressively along with Mg    #hypocalcemia - resolved   Ca low upon arrival.   - s/p Ca gluconate, PTH low but drawn s/p calcium administration   - endocrinology following

## 2022-01-09 NOTE — PROGRESS NOTE ADULT - ASSESSMENT
s/p DKA.    Continue current insulin doses, but I anticipate she will need less insulin in the next coming weeks as she enters "honeymoon phase" due to residual insulin function from small, remaining reserve.  Discussed basal/bolus insulin regimen and need to test glucose at home to monitor sugars.  Outpatient endo can order CGM for her.  Please give her our number upon discharge, [phone], [address].    Hypocalcemia probably due to vitamin D deficiency.  25D level is very low.  Calcium improving/increasing.  please give her a dose of vitamin D 50,000 IU before discharge.

## 2022-01-10 VITALS
RESPIRATION RATE: 18 BRPM | HEART RATE: 76 BPM | OXYGEN SATURATION: 99 % | TEMPERATURE: 98 F | SYSTOLIC BLOOD PRESSURE: 95 MMHG | DIASTOLIC BLOOD PRESSURE: 61 MMHG

## 2022-01-10 LAB
ALBUMIN SERPL ELPH-MCNC: 3.3 G/DL — SIGNIFICANT CHANGE UP (ref 3.3–5)
ALP SERPL-CCNC: 164 U/L — HIGH (ref 40–120)
ALT FLD-CCNC: 18 U/L — SIGNIFICANT CHANGE UP (ref 10–45)
ANION GAP SERPL CALC-SCNC: 9 MMOL/L — SIGNIFICANT CHANGE UP (ref 5–17)
AST SERPL-CCNC: 21 U/L — SIGNIFICANT CHANGE UP (ref 10–40)
BASOPHILS # BLD AUTO: 0.02 K/UL — SIGNIFICANT CHANGE UP (ref 0–0.2)
BASOPHILS NFR BLD AUTO: 0.6 % — SIGNIFICANT CHANGE UP (ref 0–2)
BILIRUB SERPL-MCNC: 0.4 MG/DL — SIGNIFICANT CHANGE UP (ref 0.2–1.2)
BUN SERPL-MCNC: 14 MG/DL — SIGNIFICANT CHANGE UP (ref 7–23)
CALCIUM SERPL-MCNC: 9 MG/DL — SIGNIFICANT CHANGE UP (ref 8.4–10.5)
CHLORIDE SERPL-SCNC: 106 MMOL/L — SIGNIFICANT CHANGE UP (ref 96–108)
CO2 SERPL-SCNC: 27 MMOL/L — SIGNIFICANT CHANGE UP (ref 22–31)
CREAT SERPL-MCNC: 0.72 MG/DL — SIGNIFICANT CHANGE UP (ref 0.5–1.3)
EOSINOPHIL # BLD AUTO: 0.03 K/UL — SIGNIFICANT CHANGE UP (ref 0–0.5)
EOSINOPHIL NFR BLD AUTO: 0.8 % — SIGNIFICANT CHANGE UP (ref 0–6)
GLUCOSE BLDC GLUCOMTR-MCNC: 213 MG/DL — HIGH (ref 70–99)
GLUCOSE BLDC GLUCOMTR-MCNC: 240 MG/DL — HIGH (ref 70–99)
GLUCOSE SERPL-MCNC: 185 MG/DL — HIGH (ref 70–99)
HCT VFR BLD CALC: 31.3 % — LOW (ref 34.5–45)
HGB BLD-MCNC: 10.8 G/DL — LOW (ref 11.5–15.5)
IMM GRANULOCYTES NFR BLD AUTO: 0.3 % — SIGNIFICANT CHANGE UP (ref 0–1.5)
LYMPHOCYTES # BLD AUTO: 1.62 K/UL — SIGNIFICANT CHANGE UP (ref 1–3.3)
LYMPHOCYTES # BLD AUTO: 44.9 % — HIGH (ref 13–44)
MAGNESIUM SERPL-MCNC: 2.2 MG/DL — SIGNIFICANT CHANGE UP (ref 1.6–2.6)
MCHC RBC-ENTMCNC: 28.7 PG — SIGNIFICANT CHANGE UP (ref 27–34)
MCHC RBC-ENTMCNC: 34.5 GM/DL — SIGNIFICANT CHANGE UP (ref 32–36)
MCV RBC AUTO: 83.2 FL — SIGNIFICANT CHANGE UP (ref 80–100)
MONOCYTES # BLD AUTO: 0.31 K/UL — SIGNIFICANT CHANGE UP (ref 0–0.9)
MONOCYTES NFR BLD AUTO: 8.6 % — SIGNIFICANT CHANGE UP (ref 2–14)
NEUTROPHILS # BLD AUTO: 1.62 K/UL — LOW (ref 1.8–7.4)
NEUTROPHILS NFR BLD AUTO: 44.8 % — SIGNIFICANT CHANGE UP (ref 43–77)
NRBC # BLD: 0 /100 WBCS — SIGNIFICANT CHANGE UP (ref 0–0)
PHOSPHATE SERPL-MCNC: 3 MG/DL — SIGNIFICANT CHANGE UP (ref 2.5–4.5)
PLATELET # BLD AUTO: 167 K/UL — SIGNIFICANT CHANGE UP (ref 150–400)
POTASSIUM SERPL-MCNC: 4.6 MMOL/L — SIGNIFICANT CHANGE UP (ref 3.5–5.3)
POTASSIUM SERPL-SCNC: 4.6 MMOL/L — SIGNIFICANT CHANGE UP (ref 3.5–5.3)
PROT SERPL-MCNC: 5.1 G/DL — LOW (ref 6–8.3)
RBC # BLD: 3.76 M/UL — LOW (ref 3.8–5.2)
RBC # FLD: 14.2 % — SIGNIFICANT CHANGE UP (ref 10.3–14.5)
SODIUM SERPL-SCNC: 142 MMOL/L — SIGNIFICANT CHANGE UP (ref 135–145)
WBC # BLD: 3.61 K/UL — LOW (ref 3.8–10.5)
WBC # FLD AUTO: 3.61 K/UL — LOW (ref 3.8–10.5)

## 2022-01-10 PROCEDURE — 84295 ASSAY OF SERUM SODIUM: CPT

## 2022-01-10 PROCEDURE — 84443 ASSAY THYROID STIM HORMONE: CPT

## 2022-01-10 PROCEDURE — 82962 GLUCOSE BLOOD TEST: CPT

## 2022-01-10 PROCEDURE — 36415 COLL VENOUS BLD VENIPUNCTURE: CPT

## 2022-01-10 PROCEDURE — 84132 ASSAY OF SERUM POTASSIUM: CPT

## 2022-01-10 PROCEDURE — 83036 HEMOGLOBIN GLYCOSYLATED A1C: CPT

## 2022-01-10 PROCEDURE — 99232 SBSQ HOSP IP/OBS MODERATE 35: CPT | Mod: GC

## 2022-01-10 PROCEDURE — 81001 URINALYSIS AUTO W/SCOPE: CPT

## 2022-01-10 PROCEDURE — 86901 BLOOD TYPING SEROLOGIC RH(D): CPT

## 2022-01-10 PROCEDURE — 84702 CHORIONIC GONADOTROPIN TEST: CPT

## 2022-01-10 PROCEDURE — 84681 ASSAY OF C-PEPTIDE: CPT

## 2022-01-10 PROCEDURE — 99285 EMERGENCY DEPT VISIT HI MDM: CPT

## 2022-01-10 PROCEDURE — 86341 ISLET CELL ANTIBODY: CPT

## 2022-01-10 PROCEDURE — 84100 ASSAY OF PHOSPHORUS: CPT

## 2022-01-10 PROCEDURE — 86900 BLOOD TYPING SEROLOGIC ABO: CPT

## 2022-01-10 PROCEDURE — 80307 DRUG TEST PRSMV CHEM ANLYZR: CPT

## 2022-01-10 PROCEDURE — 84484 ASSAY OF TROPONIN QUANT: CPT

## 2022-01-10 PROCEDURE — 96374 THER/PROPH/DIAG INJ IV PUSH: CPT

## 2022-01-10 PROCEDURE — 82010 KETONE BODYS QUAN: CPT

## 2022-01-10 PROCEDURE — 86850 RBC ANTIBODY SCREEN: CPT

## 2022-01-10 PROCEDURE — 80202 ASSAY OF VANCOMYCIN: CPT

## 2022-01-10 PROCEDURE — 82533 TOTAL CORTISOL: CPT

## 2022-01-10 PROCEDURE — 93005 ELECTROCARDIOGRAM TRACING: CPT

## 2022-01-10 PROCEDURE — 82306 VITAMIN D 25 HYDROXY: CPT

## 2022-01-10 PROCEDURE — 86337 INSULIN ANTIBODIES: CPT

## 2022-01-10 PROCEDURE — 87635 SARS-COV-2 COVID-19 AMP PRB: CPT

## 2022-01-10 PROCEDURE — 82652 VIT D 1 25-DIHYDROXY: CPT

## 2022-01-10 PROCEDURE — 82310 ASSAY OF CALCIUM: CPT

## 2022-01-10 PROCEDURE — 85025 COMPLETE CBC W/AUTO DIFF WBC: CPT

## 2022-01-10 PROCEDURE — 80048 BASIC METABOLIC PNL TOTAL CA: CPT

## 2022-01-10 PROCEDURE — 83735 ASSAY OF MAGNESIUM: CPT

## 2022-01-10 PROCEDURE — 87040 BLOOD CULTURE FOR BACTERIA: CPT

## 2022-01-10 PROCEDURE — 80061 LIPID PANEL: CPT

## 2022-01-10 PROCEDURE — 80053 COMPREHEN METABOLIC PANEL: CPT

## 2022-01-10 PROCEDURE — 83690 ASSAY OF LIPASE: CPT

## 2022-01-10 PROCEDURE — 70450 CT HEAD/BRAIN W/O DYE: CPT

## 2022-01-10 PROCEDURE — 96375 TX/PRO/DX INJ NEW DRUG ADDON: CPT

## 2022-01-10 PROCEDURE — 71045 X-RAY EXAM CHEST 1 VIEW: CPT

## 2022-01-10 PROCEDURE — 83970 ASSAY OF PARATHORMONE: CPT

## 2022-01-10 PROCEDURE — 82330 ASSAY OF CALCIUM: CPT

## 2022-01-10 PROCEDURE — 82803 BLOOD GASES ANY COMBINATION: CPT

## 2022-01-10 PROCEDURE — 99239 HOSP IP/OBS DSCHRG MGMT >30: CPT | Mod: GC

## 2022-01-10 PROCEDURE — 85730 THROMBOPLASTIN TIME PARTIAL: CPT

## 2022-01-10 PROCEDURE — 83721 ASSAY OF BLOOD LIPOPROTEIN: CPT

## 2022-01-10 PROCEDURE — 83605 ASSAY OF LACTIC ACID: CPT

## 2022-01-10 PROCEDURE — 85610 PROTHROMBIN TIME: CPT

## 2022-01-10 RX ORDER — INSULIN GLARGINE 100 [IU]/ML
12 INJECTION, SOLUTION SUBCUTANEOUS
Qty: 2 | Refills: 0
Start: 2022-01-10 | End: 2022-02-08

## 2022-01-10 RX ORDER — ERGOCALCIFEROL 1.25 MG/1
50000 CAPSULE ORAL ONCE
Refills: 0 | Status: COMPLETED | OUTPATIENT
Start: 2022-01-10 | End: 2022-01-10

## 2022-01-10 RX ORDER — ISOPROPYL ALCOHOL, BENZOCAINE .7; .06 ML/ML; ML/ML
1 SWAB TOPICAL
Qty: 100 | Refills: 1
Start: 2022-01-10 | End: 2022-02-28

## 2022-01-10 RX ORDER — INSULIN ASPART 100 [IU]/ML
8 INJECTION, SOLUTION SUBCUTANEOUS
Qty: 3 | Refills: 0
Start: 2022-01-10 | End: 2022-02-08

## 2022-01-10 RX ADMIN — Medication 1300 MILLIGRAM(S): at 05:12

## 2022-01-10 RX ADMIN — CHLORHEXIDINE GLUCONATE 1 APPLICATION(S): 213 SOLUTION TOPICAL at 05:12

## 2022-01-10 RX ADMIN — ERGOCALCIFEROL 50000 UNIT(S): 1.25 CAPSULE ORAL at 13:59

## 2022-01-10 RX ADMIN — Medication 4: at 08:46

## 2022-01-10 RX ADMIN — Medication 4: at 12:43

## 2022-01-10 RX ADMIN — Medication 5 UNIT(S): at 12:43

## 2022-01-10 RX ADMIN — Medication 5 UNIT(S): at 08:47

## 2022-01-10 RX ADMIN — Medication 1300 MILLIGRAM(S): at 13:59

## 2022-01-10 NOTE — DISCHARGE NOTE PROVIDER - PROVIDER TOKENS
FREE:[LAST:[Meek],FIRST:[Ignacia],PHONE:[phone]],FAX:[(   )    -],ADDRESS:[Family Practice Physician   [address] [place] [address]],SCHEDULEDAPPT:[01/20/2022],SCHEDULEDAPPTTIME:[10:30 PM]]

## 2022-01-10 NOTE — DISCHARGE NOTE PROVIDER - NSDCCPCAREPLAN_GEN_ALL_CORE_FT
PRINCIPAL DISCHARGE DIAGNOSIS  Diagnosis: DKA (diabetic ketoacidosis)  Assessment and Plan of Treatment:        PRINCIPAL DISCHARGE DIAGNOSIS  Diagnosis: DKA (diabetic ketoacidosis)  Assessment and Plan of Treatment: Diabetic ketoacidosis (DKA) happens when the body does not have enough insulin and can't get the sugar it needs for energy. When the body can't use sugar for energy, it starts to use fat for energy. This process makes fatty acids called ketones. The ketones build up in the blood and change the chemical balance in your body.  This problem can be very dangerous and needs to be treated. Without treatment, it can lead to a coma or death.  Follow-up care is a key part of your treatment and safety. Be sure to make and go to all appointments, and call your doctor or nurse call line if you are having problems. It's  To reduce your chance of ketoacidosis:  Take your insulin and other diabetes medicines on time and in the right dose.  Test your blood sugar before meals and at bedtime or as often as your doctor advises. This is the best way to know when your blood sugar is high so you can treat it early.   Teach others at work and at home how to check your blood sugar.   Make sure that someone else knows how do it in case you can't.  Wear or carry medical identification at all times.   Eat regular meals that spread your calories and carbohydrate throughout the day. This will help keep your blood sugar steady.  Call 911 anytime you think you may need emergency care. For example, call if:  You passed out (lost consciousness).  You are confused or cannot think clearly.  Your blood sugar is very high or very low.  Watch closely for changes in your health, and be sure to contact your doctor or nurse call line if:  Your blood sugar stays outside the level your doctor set for you.  You have any problems.         PRINCIPAL DISCHARGE DIAGNOSIS  Diagnosis: DKA (diabetic ketoacidosis)  Assessment and Plan of Treatment: Diabetic ketoacidosis (DKA) happens when the body does not have enough insulin and can't get the sugar it needs for energy. When the body can't use sugar for energy, it starts to use fat for energy. This process makes fatty acids called ketones. The ketones build up in the blood and change the chemical balance in your body.  This problem can be very dangerous and needs to be treated. Without treatment, it can lead to a coma or death.  Follow-up care is a key part of your treatment and safety. Be sure to make and go to all appointments, and call your doctor or nurse call line if you are having problems. It's  To reduce your chance of ketoacidosis:  Take your insulin and other diabetes medicines on time and in the right dose.  Test your blood sugar before meals and at bedtime or as often as your doctor advises. This is the best way to know when your blood sugar is high so you can treat it early.   Teach others at work and at home how to check your blood sugar.   Make sure that someone else knows how do it in case you can't.  Wear or carry medical identification at all times.   Eat regular meals that spread your calories and carbohydrate throughout the day. This will help keep your blood sugar steady.  Call 911 anytime you think you may need emergency care. For example, call if:  You passed out (lost consciousness).  You are confused or cannot think clearly.  Your blood sugar is very high or very low.  Watch closely for changes in your health, and be sure to contact your doctor or nurse call line if:  Your blood sugar stays outside the level your doctor set for you.  You have any problems.  Please see your primary care doctor and discuss with her your admission here. She will be able to help you manage your diabetes, and to recommend an endocrinologist. You will also need to see an opthalmologist once a year as well as a podiatrist for preventitive monitoring.         PRINCIPAL DISCHARGE DIAGNOSIS  Diagnosis: DKA (diabetic ketoacidosis)  Assessment and Plan of Treatment: Diabetic ketoacidosis (DKA) happens when the body does not have enough insulin and can't get the sugar it needs for energy. When the body can't use sugar for energy, it starts to use fat for energy. This process makes fatty acids called ketones. The ketones build up in the blood and change the chemical balance in your body.  This problem can be very dangerous and needs to be treated. Without treatment, it can lead to a coma or death.  Follow-up care is a key part of your treatment and safety. Be sure to make and go to all appointments, and call your doctor or nurse call line if you are having problems.   To reduce your chance of ketoacidosis:  Take your insulin and other diabetes medicines on time and in the right dose.  Test your blood sugar before meals and at bedtime or as often as your doctor advises. This is the best way to know when your blood sugar is high so you can treat it early.   Teach others at work and at home how to check your blood sugar.   Make sure that someone else knows how do it in case you can't.  Wear or carry medical identification at all times.   Eat regular meals that spread your calories and carbohydrate throughout the day. This will help keep your blood sugar steady.  Call 911 anytime you think you may need emergency care. For example, call if:  You passed out (lost consciousness).  You are confused or cannot think clearly.  Your blood sugar is very high or very low.  Watch closely for changes in your health, and be sure to contact your doctor or nurse call line if:  Your blood sugar stays outside the level your doctor set for you.  You have any problems.  Please see your primary care doctor and discuss with her your admission here. She will be able to help you manage your diabetes, and to recommend an endocrinologist. You will also need to see an opthalmologist once a year as well as a podiatrist for preventitive monitoring.  You will use 12 units of Lantus/Long acting insulin at night. 8Units of lispro/shortacting/Novolog before meals.

## 2022-01-10 NOTE — PROGRESS NOTE ADULT - PROBLEM SELECTOR PLAN 1
Presented in DKA. No known hx of diabetes. Likely new diagnosis of T1DM.  - Endocrinology following  - Anion gap closed, transitioned to Lantus 12U and Lispro 5U TID   - now tolerating PO fluids   - bicarb tablets (1500mg q8h) for another 24-48 hr given latest ABG pH 7.27  - f/u Diabetes antibodies  -Outpatient f/u with endocrinology  -Diabetes supplies on dc
Presented in DKA. No known hx of diabetes. Likely new diagnosis of T1DM.  - Endocrinology following  - Lantus 12U and Lispro 5U TID   - Diabetic educator in AM and insulin injection teaching/demonstration   - Outpatient f/u with endocrinology  - Diabetes supplies on dc
Presented in DKA. No known hx of diabetes. Likely new diagnosis of T1DM.  - Endocrinology following  - Anion gap closed, transitioned to Lantus 12U and Lispro 5U TID   - now tolerating PO fluids   - bicarb tablets (1500mg q8h) for another 24-48 hr given latest ABG pH 7.27  - f/u Diabetes antibodies  -Outpatient f/u with endocrinology  -Diabetes supplies on dc
Presented in DKA. No known hx of diabetes. Likely new diagnosis of T1DM.  - Endocrinology following  - Anion gap closed, transitioned to Lantus 12U and Lispro 5U TID   - now tolerating PO fluids   - bicarb tablets for another 24-48 hr given latest ABG pH 7.27  - f/u Diabetes antibodies

## 2022-01-10 NOTE — DISCHARGE NOTE NURSING/CASE MANAGEMENT/SOCIAL WORK - PATIENT PORTAL LINK FT
You can access the FollowMyHealth Patient Portal offered by [facility] by registering at the following website: [email or web address]. By joining [employer]’s FollowMyHealth portal, you will also be able to view your health information using other applications (apps) compatible with our system.

## 2022-01-10 NOTE — PROGRESS NOTE ADULT - SUBJECTIVE AND OBJECTIVE BOX
OVERNIGHT EVENTS:    SUBJECTIVE / INTERVAL HPI: Patient seen and examined at bedside.     VITAL SIGNS:  Vital Signs Last 24 Hrs  T(C): 36.4 (10 Apollo 2022 05:05), Max: 36.7 (09 Jan 2022 20:16)  T(F): 97.5 (10 [place] 2022 05:05), Max: 98 (09 Jan 2022 20:16)  HR: 74 (10 [place] 2022 05:05) (74 - 86)  BP: 97/63 (10 [place] 2022 05:05) (96/62 - 97/69)  BP(mean): --  RR: 18 (10 [place] 2022 05:05) (14 - 18)  SpO2: 100% (10 [place] 2022 05:05) (97% - 100%)    PHYSICAL EXAM:    General: No acute distress  HEENT: NC/AT; PERRL, anicteric sclera; MMM  Neck: supple  Cardiovascular: +S1/S2, RRR, no murmurs, rubs, gallops  Respiratory: CTA B/L; no W/R/R  Gastrointestinal: soft, NT/ND; +BSx4  Extremities: WWP; no edema, clubbing or cyanosis  Vascular: 2+ radial, DP/PT pulses B/L  Neurological: AAOx3; no focal deficits    MEDICATIONS:  MEDICATIONS  (STANDING):  chlorhexidine 2% Cloths 1 Application(s) Topical <User Schedule>  dextrose 40% Gel 15 Gram(s) Oral once  dextrose 5%. 1000 milliLiter(s) (50 mL/Hr) IV Continuous <Continuous>  dextrose 5%. 1000 milliLiter(s) (100 mL/Hr) IV Continuous <Continuous>  dextrose 50% Injectable 50 milliLiter(s) IV Push every 15 minutes  glucagon  Injectable 1 milliGRAM(s) IntraMuscular once  insulin glargine Injectable (LANTUS) 12 Unit(s) SubCutaneous at bedtime  insulin lispro (ADMELOG) corrective regimen sliding scale   SubCutaneous Before meals and at bedtime  insulin lispro Injectable (ADMELOG) 5 Unit(s) SubCutaneous three times a day before meals  sodium bicarbonate 1300 milliGRAM(s) Oral every 8 hours    MEDICATIONS  (PRN):      ALLERGIES:  Allergies    Allergy Status Unknown    Intolerances        LABS:                        10.0   5.41  )-----------( 148      ( 09 Jan 2022 07:05 )             28.6     01-09    142  |  111<H>  |  11  ----------------------------<  123<H>  3.1<L>   |  19<L>  |  0.82    Ca    8.3<L>      09 Jan 2022 07:05  Phos  2.3     01-09  Mg     2.1     01-09    TPro  4.6<L>  /  Alb  3.0<L>  /  TBili  0.4  /  DBili  x   /  AST  20  /  ALT  18  /  AlkPhos  164<H>  01-09        CAPILLARY BLOOD GLUCOSE      POCT Blood Glucose.: 339 mg/dL (09 Jan 2022 21:31)      RADIOLOGY & ADDITIONAL TESTS: Reviewed.    PLAN:

## 2022-01-10 NOTE — DISCHARGE NOTE PROVIDER - CARE PROVIDER_API CALL
Ignacia [place]  Family Practice Physician   [address] [place] [address]  Phone: [phone]  Fax: (   )    -  Scheduled Appointment: 01/20/2022 10:30 PM

## 2022-01-10 NOTE — PROGRESS NOTE ADULT - ATTENDING COMMENTS
Patient awake and alert and anion gap closed. Has primary non anion gap hypercl met acidosis and will start sodium bicarb tabs for next 24-48 hours. Endo plans noted. Transition to Lantus. follow K.
Pt seen on rounds this afternoon and events of the weekend reviewed.  Looks markedly better, and mental status has returned to baseline.  Bicarb level has normalized  Calcium level has also normalized.  25-D level was normal, and pt reports that she actually takes vitamin D supplementation--but is not sure about the dose (1000 or 2000 units/day).  PTH level was normal, but inappropriately so given the degree of hypocalcemia--explanation for this is also unclear  Glucoses spiked to 416/339 last night, but reason unclear.  Today is down to 213/240  Will increase the Lantus to 15 units for discharge, the premeal to 8 units lispro  Discussed importance of carb consistency in her diet, and outlined appropriate portions of starch/fruit at her main meals-but still needs to be seen by Nutrition.  She will be returning to [place] tonight  Strongly encouraged her to ask her PCP to arrange an urgent Endocrinology referral for follow-up
Agree with assessment and plan as documented by resident.   --diabetic education  --will give 50,000u vit d   --plan to dc today on lantus/lispro per endocrine team recs with outpatient further follow up
reviewed pertinent data , chart note  patient seen and examined overnight   a/f DKA, resolved    PE as above    1. DKA: resolved, monitor glucose on basal-bolus regimen, followup endocrine recs.        rest of  plan as above

## 2022-01-10 NOTE — PROGRESS NOTE ADULT - SUBJECTIVE AND OBJECTIVE BOX
INTERVAL HPI/OVERNIGHT EVENTS:    Patient is a 21y old  Female who presents with a chief complaint of DKA (10 Apollo 2022 11:48)  Patient clinically improving  FSG & Insulin received:    Yesterday:  pre-dinner fsg:  nutritional lispro   units +   units lispro SS  bedtime fsg:  lantus   units +    units lispro SS    Today:  pre-breakfast fsg:  nutritional lispro   units+    units lispro SS  pre-lunch fsg:  nutritional lispro   units+   units lispro SS    Pt reports the following symptoms:    CONSTITUTIONAL:  Negative fever or chills, feels well, good appetite  EYES:  Negative  blurry vision or double vision  CARDIOVASCULAR:  Negative for chest pain or palpitations  RESPIRATORY:  Negative for cough, wheezing, or SOB   GASTROINTESTINAL:  Negative for nausea, vomiting, diarrhea, constipation, or abdominal pain  GENITOURINARY:  Negative frequency, urgency or dysuria  NEUROLOGIC:  No headache, confusion, dizziness, lightheadedness    MEDICATIONS  (STANDING):  chlorhexidine 2% Cloths 1 Application(s) Topical <User Schedule>  dextrose 40% Gel 15 Gram(s) Oral once  dextrose 5%. 1000 milliLiter(s) (50 mL/Hr) IV Continuous <Continuous>  dextrose 5%. 1000 milliLiter(s) (100 mL/Hr) IV Continuous <Continuous>  dextrose 50% Injectable 50 milliLiter(s) IV Push every 15 minutes  ergocalciferol 39084 Unit(s) Oral once  glucagon  Injectable 1 milliGRAM(s) IntraMuscular once  insulin glargine Injectable (LANTUS) 12 Unit(s) SubCutaneous at bedtime  insulin lispro (ADMELOG) corrective regimen sliding scale   SubCutaneous Before meals and at bedtime  insulin lispro Injectable (ADMELOG) 5 Unit(s) SubCutaneous three times a day before meals  sodium bicarbonate 1300 milliGRAM(s) Oral every 8 hours    MEDICATIONS  (PRN):      PHYSICAL EXAM  Vital Signs Last 24 Hrs  T(C): 36.5 (10 Apollo 2022 11:26), Max: 36.7 (09 Jan 2022 20:16)  T(F): 97.7 (10 Apollo 2022 11:26), Max: 98 (09 Jan 2022 20:16)  HR: 76 (10 Apollo 2022 11:26) (74 - 86)  BP: 95/61 (10 Apollo 2022 11:26) (95/61 - 97/69)  BP(mean): --  RR: 18 (10 Apollo 2022 11:26) (16 - 18)  SpO2: 99% (10 Apollo 2022 11:26) (97% - 100%)    Constitutional: NAD.   HEENT: NCAT, MMM, OP clear, EOMI, , no proptosis or lid retraction  Neck: no thyromegaly or palpable thyroid nodules   Respiratory: lungs CTAB.  Cardiovascular: regular rhythm, normal S1 and S2, no audible murmurs, no peripheral edema  GI: soft, NT/ND, no masses/HSM appreciated.  Neurology: no tremors, DTR 2+  Skin: no visible rashes/lesions  Psychiatric: AAO x 3, normal affect/mood.    LABS:                        10.8   3.61  )-----------( 167      ( 10 Apollo 2022 08:41 )             31.3     01-10    142  |  106  |  14  ----------------------------<  185<H>  4.6   |  27  |  0.72    Ca    9.0      10 Apollo 2022 08:41  Phos  3.0     01-10  Mg     2.2     01-10    TPro  5.1<L>  /  Alb  3.3  /  TBili  0.4  /  DBili  x   /  AST  21  /  ALT  18  /  AlkPhos  164<H>  01-10        Thyroid Stimulating Hormone, Serum: 2.066 uIU/mL (01-07 @ 07:58)      HbA1C:         Insulin Sliding Scale requirements X 24 Hours:      RADIOLOGY & ADDITIONAL TESTS:      A/P:21y Female with no PMHx presenting in DKA   A1C: >20.1  Wt:40.9kg  Cr:0.72  GFR:120  1.  DM- DKA resolved  would get islet cell antibodies   would also get celiac antibodies in setting on new onset type 1 DM   Continue Lantus....  Lispro premeal....  moderate dose sliding scale before meals and at bedtime   In need of diabetes educations  Insulin teaching  glucose monitoring teaching     2. Hypocalcemia-  noted to have drop in calcium after admission  Given 2g Calcium gluconate, no additional supplementation was necessary   improved to 9.6 corrected today   VitD25 level-32.2  PTH with calcium level: 43/7.8          Will continue to monitor         Pt can follow up at discharge with [facility] by calling  to make an appointment.   Will discuss case with Dr. Lunsford and update primary team   INTERVAL HPI/OVERNIGHT EVENTS:    Patient is a 21y old  Female who presents with a chief complaint of DKA (10 Apollo 2022 11:48)  Patient clinically improving  FSG & Insulin received:    Yesterday:  pre-dinner fs  nutritional lispro 5  units + 12  units lispro SS  bedtime fs  lantus 12  units + 8   units lispro SS    Today:  pre-breakfast fs  nutritional lispro 5  units+ 4   units lispro SS  pre-lunch fs  nutritional lispro 5  units+ 4  units lispro SS    Pt reports the following symptoms:    CONSTITUTIONAL:  Negative fever or chills, feels well, good appetite  EYES:  Negative  blurry vision or double vision  CARDIOVASCULAR:  Negative for chest pain or palpitations  RESPIRATORY:  Negative for cough, wheezing, or SOB   GASTROINTESTINAL:  Negative for nausea, vomiting, diarrhea, constipation, or abdominal pain  GENITOURINARY:  Negative frequency, urgency or dysuria  NEUROLOGIC:  No headache, confusion, dizziness, lightheadedness    MEDICATIONS  (STANDING):  chlorhexidine 2% Cloths 1 Application(s) Topical <User Schedule>  dextrose 40% Gel 15 Gram(s) Oral once  dextrose 5%. 1000 milliLiter(s) (50 mL/Hr) IV Continuous <Continuous>  dextrose 5%. 1000 milliLiter(s) (100 mL/Hr) IV Continuous <Continuous>  dextrose 50% Injectable 50 milliLiter(s) IV Push every 15 minutes  ergocalciferol 89894 Unit(s) Oral once  glucagon  Injectable 1 milliGRAM(s) IntraMuscular once  insulin glargine Injectable (LANTUS) 12 Unit(s) SubCutaneous at bedtime  insulin lispro (ADMELOG) corrective regimen sliding scale   SubCutaneous Before meals and at bedtime  insulin lispro Injectable (ADMELOG) 5 Unit(s) SubCutaneous three times a day before meals  sodium bicarbonate 1300 milliGRAM(s) Oral every 8 hours    MEDICATIONS  (PRN):      PHYSICAL EXAM  Vital Signs Last 24 Hrs  T(C): 36.5 (10 Apollo 2022 11:26), Max: 36.7 (2022 20:16)  T(F): 97.7 (10 Apollo 2022 11:26), Max: 98 (2022 20:16)  HR: 76 (10 Apollo 2022 11:26) (74 - 86)  BP: 95/61 (10 Apollo 2022 11:26) (95/61 - 97/69)  BP(mean): --  RR: 18 (10 Apollo 2022 11:26) (16 - 18)  SpO2: 99% (10 Apollo 2022 11:26) (97% - 100%)    Constitutional: thin female, NAD.   HEENT: NCAT, MMM, OP clear, EOMI, , no proptosis or lid retraction  Neck: no thyromegaly or palpable thyroid nodules   Respiratory: lungs CTAB.  Cardiovascular: regular rhythm, normal S1 and S2, no audible murmurs, no peripheral edema  GI: soft, NT/ND, no masses/HSM appreciated.  Neurology: no tremors, DTR 2+  Skin: no visible rashes/lesions  Psychiatric: AAO x 3, normal affect/mood.    LABS:                        10.8   3.61  )-----------( 167      ( 10 Apollo 2022 08:41 )             31.3     01-10    142  |  106  |  14  ----------------------------<  185<H>  4.6   |  27  |  0.72    Ca    9.0      10 Apollo 2022 08:41  Phos  3.0     -10  Mg     2.2     -10    TPro  5.1<L>  /  Alb  3.3  /  TBili  0.4  /  DBili  x   /  AST  21  /  ALT  18  /  AlkPhos  164<H>  01-10        Thyroid Stimulating Hormone, Serum: 2.066 uIU/mL ( @ 07:58)      HbA1C:         Insulin Sliding Scale requirements X 24 Hours:      RADIOLOGY & ADDITIONAL TESTS:      A/P:21y Female with no PMHx presenting in DKA   A1C: >20.1  Wt:40.9kg  Cr:0.72  GFR:120  1.  DM- DKA resolved  f/u antibodies  would also get celiac antibodies in setting of new onset type 1 DM   Increase to Lantus 15 units at bedtime  Lispro premeal 8 units   moderate dose sliding scale before meals and at bedtime   In need of diabetes educations  Insulin teaching  glucose monitoring teaching     2. Hypocalcemia-  noted to have drop in calcium after admission  Given 2g Calcium gluconate, no additional supplementation was necessary   improved to 9.6 corrected today   VitD25 level-32.2  PTH with calcium level: 43/7.8- PTH inappropriately low   Should establish care with endocrinology  has PCP in [place] -         On discharge resume regimen as above.         Pt can follow up at discharge with Endocrinologist in [place] per recommendations from PCP.   Will discuss case with Dr. Lunsford and update primary team

## 2022-01-10 NOTE — DISCHARGE NOTE PROVIDER - HOSPITAL COURSE
#Discharge: do not delete    Patient is __ yo M/F with past medical history of _____, presented with _____, found to have _____      Inpatient treatment course:     Problem List/Main Diagnoses:     New medications/therapies:   New lines/hardware:  Labs to be followed outpatient:   Exam to be followed outpatient:     Discharge plan: discharge to home     #Discharge: do not delete    HOSPITAL COURSE:  20 yo previously healthy female BIBEMS after being found altered and weak at a friends apartment. Patient is visiting a friend from [place] and reports weight loss and increased urinary frequency x6 months - 1 year. On the AM of 1/7/2022 the patients friend found her weak and altered in the kitchen and called EMS. On admission the patient was found to to be in DKA with the following values: , arterial pH 6.93, AG >26, A1c of 20 and BHB of 8. The patient was admitted to the [facility] and started on DKA protocol, which included fluids, insulin gtt and bicarb given arterial pH less than 7. The patient also had electrolyte disturbances that have since resolved following repletion prn. Her anion gap cis now closed for 2+ blood chemistries. She has transitioned to SubQ insulin and will begin a regimen of 12U long acting at bedtime with 5U short acting TID. Her latest ABG on the AM of the 8th still shows acidosis, a non-AG metabolic acidosis, so the patient will continue Bicarb tablets for another 24-48 hours. On exam, her AMS has greatly improved and she is now AOx3 and only slightly groggy.     Inpatient treatment course:     Problem List/Main Diagnoses:   #DKA (diabetic ketoacidosis).    Presented in DKA. No known hx of diabetes. Likely new diagnosis of T1DM.  - Anion gap closed, transitioned to Lantus 12U and Lispro 5U TID   - now tolerating PO fluids   - bicarb tablets (1500mg q8h) for another 24-48 hr given latest ABG pH 7.27  - f/u Diabetes antibodies  -Outpatient f/u with endocrinology  -Diabetes supplies on dc.    # Electrolyte imbalance.   #hypokalemia - resolved   - monitor BMP closely    #hypocalcemia - resolved   Ca low upon arrival.   - s/p Ca gluconate, PTH low but drawn s/p calcium administration   - endocrinology following.    #Nutrition, metabolism, and development symptoms.   F: PO  E: replenish aggressively  N: Consistent carb  VTE Prophylaxis: none, patient mobile   GI: not needed  C: Full Code  D: home.    New medications/therapies:   New lines/hardware:  Labs to be followed outpatient:   Exam to be followed outpatient:     Discharge plan: discharge to home     #Discharge: do not delete    Inpatient treatment course:   22 yo previously healthy female BIBEMS after being found altered and weak at a friends apartment. Patient is visiting a friend from [place] and reports weight loss and increased urinary frequency x6 months - 1 year. On the AM of 1/7/2022 the patients friend found her weak and altered in the kitchen and called EMS. On admission the patient was found to to be in DKA with the following values: , arterial pH 6.93, AG >26, A1c of 20 and BHB of 8. The patient was admitted to the [facility] and started on DKA protocol, which included fluids, insulin gtt and bicarb given arterial pH less than 7. The patient also had electrolyte disturbances that have since resolved following repletion prn. Her anion gap cis now closed for 2+ blood chemistries. She has transitioned to SubQ insulin began a regimen of 12U long acting at bedtime with 5U short acting TID. Her latest ABG on the AM of the 8th still shows acidosis, a non-AG metabolic acidosis, completed 48h Bicarb tabs       Problem List/Main Diagnoses:   #DKA (diabetic ketoacidosis). RESOLVED   Presented in DKA. No known hx of diabetes. Likely new diagnosis of T1DM.  - Anion gap closed, transitioned to Lantus 12U and Lispro 5U TID   - now tolerating PO fluids   - bicarb tablets (1500mg q8h) for another 24-48 hr given latest ABG pH 7.27  - f/u Diabetes antibodies  -Outpatient f/u with endocrinology  -Diabetes supplies on dc.      #hypokalemia - resolved     #hypocalcemia - resolved   Ca low upon arrival. s/p Ca gluconate    New medications/therapies: Lantus 12U, Lispro 5U  New lines/hardware: glucometer, diabetes supplies  Labs to be followed outpatient: A1C, FSG  Exam to be followed outpatient: Opthalmology, podiatry     Discharge plan: discharge to home     #Discharge: do not delete    Inpatient treatment course:   20 yo previously healthy female BIBEMS after being found altered and weak at a friends apartment. Patient is visiting a friend from [place] and reports weight loss and increased urinary frequency x6 months - 1 year. On the AM of 1/7/2022 the patients friend found her weak and altered in the kitchen and called EMS. On admission the patient was found to to be in DKA with the following values: , arterial pH 6.93, AG >26, A1c of 20 and BHB of 8. The patient was admitted to the [facility] and started on DKA protocol, which included fluids, insulin gtt and bicarb given arterial pH less than 7. The patient also had electrolyte disturbances that have since resolved following repletion prn. Her anion gap cis now closed for 2+ blood chemistries. She has transitioned to SubQ insulin began a regimen of 12U long acting at bedtime with 5U short acting TID. Her latest ABG on the AM of the 8th still shows acidosis, a non-AG metabolic acidosis, completed 48h Bicarb tabs. She will be discharged with 12U Lantus, 8U Lispro.      Problem List/Main Diagnoses:   #DKA (diabetic ketoacidosis). RESOLVED   Presented in DKA. No known hx of diabetes. Likely new diagnosis of T1DM.  Lantus 12U and Lispro 8U TID   - now tolerating PO fluids   - bicarb tablets (1500mg q8h) for another 24-48 hr given latest ABG pH 7.27  - f/u Diabetes antibodies  -Outpatient f/u with endocrinology  -Diabetes supplies on dc.      #hypokalemia - resolved     #hypocalcemia - resolved   Ca low upon arrival. s/p Ca gluconate    New medications/therapies: Lantus 12U, Lispro 8U  New lines/hardware: glucometer, diabetes supplies  Labs to be followed outpatient: A1C, FSG  Exam to be followed outpatient: Opthalmology, podiatry     Discharge plan: discharge to home

## 2022-01-10 NOTE — DISCHARGE NOTE NURSING/CASE MANAGEMENT/SOCIAL WORK - NSDCPEFALRISK_GEN_ALL_CORE
For information on Fall & Injury Prevention, visit: [email or web address] OR  [email or web address] OR  https://www.cdc.gov/steadi/patient.html

## 2022-01-10 NOTE — PROGRESS NOTE ADULT - PROVIDER SPECIALTY LIST ADULT
Endocrinology
Hospitalist
Internal Medicine
MICU
Endocrinology
Endocrinology
Internal Medicine
Internal Medicine

## 2022-01-10 NOTE — DISCHARGE NOTE PROVIDER - NSDCMRMEDTOKEN_GEN_ALL_CORE_FT
alcohol swabs : Apply topically to affected area 4 times a day   glucometer (per patient&#x27;s insurance): Test blood sugars four times a day. Dispense #1 glucometer.  Insulin Pen Needles, 4mm: 1 application subcutaneously 4 times a day. ** Use with insulin pen **   lancets: 1 application subcutaneously 4 times a day   Lantus Solostar Pen 100 units/mL subcutaneous solution: 12 unit(s) subcutaneous once a day (at bedtime)   NovoLOG FlexPen 100 units/mL injectable solution: 8 unit(s) subcutaneous 3 times a day (with meals)   test strips (per patient&#x27;s insurance): 1 application subcutaneously 4 times a day. ** Compatible with patient&#x27;s glucometer **

## 2022-01-11 LAB — ISLET CELL512 AB SER-SCNC: 0 NMOL/L — SIGNIFICANT CHANGE UP

## 2022-01-12 LAB
CULTURE RESULTS: SIGNIFICANT CHANGE UP
CULTURE RESULTS: SIGNIFICANT CHANGE UP
ISLET CELL512 AB SER-ACNC: SIGNIFICANT CHANGE UP
SPECIMEN SOURCE: SIGNIFICANT CHANGE UP
SPECIMEN SOURCE: SIGNIFICANT CHANGE UP

## 2022-01-13 LAB
GAD65 AB SER-MCNC: 0.32 NMOL/L — HIGH
INSULIN ANTIBODIES: <5 UU/ML — SIGNIFICANT CHANGE UP

## 2022-01-14 DIAGNOSIS — R00.0 TACHYCARDIA, UNSPECIFIED: ICD-10-CM

## 2022-01-14 DIAGNOSIS — E10.10 TYPE 1 DIABETES MELLITUS WITH KETOACIDOSIS WITHOUT COMA: ICD-10-CM

## 2022-01-14 DIAGNOSIS — E83.51 HYPOCALCEMIA: ICD-10-CM

## 2022-01-14 DIAGNOSIS — E87.6 HYPOKALEMIA: ICD-10-CM

## 2022-01-14 DIAGNOSIS — G93.41 METABOLIC ENCEPHALOPATHY: ICD-10-CM

## 2022-01-15 LAB — ZINC TRANSPORTER 8 AB, RESULT: <15 U/ML — SIGNIFICANT CHANGE UP

## 2023-07-15 ENCOUNTER — EMERGENCY (EMERGENCY)
Facility: HOSPITAL | Age: 23
LOS: 1 days | Discharge: HOME | End: 2023-07-15
Admitting: EMERGENCY MEDICINE
Payer: COMMERCIAL

## 2023-07-15 VITALS
TEMPERATURE: 98 F | DIASTOLIC BLOOD PRESSURE: 71 MMHG | HEIGHT: 61.5 IN | SYSTOLIC BLOOD PRESSURE: 97 MMHG | WEIGHT: 119.93 LBS | RESPIRATION RATE: 16 BRPM | HEART RATE: 69 BPM | OXYGEN SATURATION: 99 %

## 2023-07-15 PROCEDURE — 99282 EMERGENCY DEPT VISIT SF MDM: CPT

## 2023-07-15 NOTE — ED ADULT NURSE NOTE - NSFALLUNIVINTERV_ED_ALL_ED
Bed/Stretcher in lowest position, wheels locked, appropriate side rails in place/Call bell, personal items and telephone in reach/Instruct patient to call for assistance before getting out of bed/chair/stretcher/Non-slip footwear applied when patient is off stretcher/[place] to call system/Physically safe environment - no spills, clutter or unnecessary equipment/Purposeful proactive rounding/Room/bathroom lighting operational, light cord in reach

## 2023-07-15 NOTE — ED ADULT NURSE NOTE - OBJECTIVE STATEMENT
pt has a lac to the 4th digit on left hand. pt stated occurred 2 weeks ago, now c/o o pain when griping. pt AOX4 no other complaints at this time
<<----- Click to add NO significant Past Surgical History

## 2023-07-15 NOTE — ED PROVIDER NOTE - PHYSICAL EXAMINATION
Constitutional: Well appearing, awake, alert, oriented to person, place, time/situation and in no apparent distress.  HEENT: Airway patent, NCAT  Resp: no conversational dyspnea, tachypnea, or signs of respiratory distress  Msk: ambulating with normal steady gait  Neuro: A&Ox3   Skin: right 4th digit volar aspect with 2cm laceration, healing normally by secondary intention from the wound base up, no erythema, induration or fluctuance, no tenderness, normal ROM of the digit including flexion of the finger, normal sensation, cap refill <2 seconds.

## 2023-07-15 NOTE — ED ADULT TRIAGE NOTE - CHIEF COMPLAINT QUOTE
female here for eval of laceration to right 4th digit x 2 weeks. patient states she got cut after a glass juan jar shattered and she attempted to catch it. wound appears to be healing well but site is not well approximated. patient denies being seen during initial injury and did not get sutures

## 2023-07-15 NOTE — ED PROVIDER NOTE - OBJECTIVE STATEMENT
24yo F presents with c/o wound check. states she cut her right 4th digit 2 weeks ago and did not present for sutures at that time but wants to make sure it's healing normally. denies pain, fever, chills, drainage from the wound, loss of ROM of the digit, any other concerns.

## 2023-07-15 NOTE — ED PROVIDER NOTE - PATIENT PORTAL LINK FT
You can access the FollowMyHealth Patient Portal offered by [facility] by registering at the following website: [email or web address]. By joining [facility]’s FollowMyHealth portal, you will also be able to view your health information using other applications (apps) compatible with our system.

## 2023-07-15 NOTE — ED ADULT NURSE NOTE - NSFALLLASTSIX_ED_ALL_ED
Pneumococcal Polysaccharide ([id number]) 2019, 2019, 2020    Tdap (Boostrix, Adacel) 2021       Active Problems:  Patient Active Problem List   Diagnosis Code    HTN (hypertension) I10    Hyperlipidemia E78.5    CAD (coronary artery disease) I25.10    Hypokalemia E87.6    Obesity (BMI 30-39. 9) E66.9    Anxiety F41.9    Primary insomnia F51.01    Epigastric pain R10.13    Chronic tension-type headache, intractable G44.221    MARIA C (obstructive sleep apnea) G47.33    Painful total knee replacement, initial encounter ([facility]) T84.84XA, Z96.659    Tremor, essential G25.0    TIA (transient ischemic attack) G45.9    Alzheimer's dementia without behavioral disturbance ([facility]) G30.9, F02.80    Symptomatic bradycardia R00.1    Acute on chronic congestive heart failure ([facility]) I50.9    Chronic kidney disease N18.9    History of transcatheter aortic valve replacement (TAVR) Z95.2    MARIO (acute kidney injury) ([facility] [address] N17.9       Isolation/Infection:   Isolation            No Isolation          Patient Infection Status       Infection Onset Added Last Indicated Last Indicated By Review Planned Expiration Resolved Resolved By    None active    Resolved    COVID-19 21 Covid-19 Ambulatory   10/08/21     COVID-19 (Rule Out) 21 Covid-19 Ambulatory (Ordered)   21 Rule-Out Test Resulted    COVID-19 (Rule Out) 21 COVID-19, Rapid (Ordered)   21 Rule-Out Test Resulted            Nurse Assessment:  Last Vital Signs: BP (!) 156/84   Pulse 88   Temp 98.7 °F (37.1 °C) (Oral)   Resp 14   Ht 5' (1.524 m)   Wt 186 lb 15.2 oz (84.8 kg)   LMP  (LMP Unknown)   SpO2 97%   BMI 36.51 kg/m²     Last documented pain score (0-10 scale): Pain Level: 4  Last Weight:   Wt Readings from Last 1 Encounters:   21 186 lb 15.2 oz (84.8 kg)     Mental Status:  oriented, alert, coherent, logical, thought processes intact, and able to concentrate and follow conversation    IV Access:  - None    Nursing Mobility/ADLs:  Walking   Independent  Transfer  Independent  Bathing  Independent  Dressing  Independent  [address]  Independent  Med Delivery   whole    Wound Care Documentation and Therapy:        Elimination:  Continence: Bowel: No  Bladder: No  Urinary Catheter: Removal Date 11/28/21    Colostomy/Ileostomy/Ileal Conduit: No       Date of Last BM: 11/29/2021    Intake/Output Summary (Last 24 hours) at 11/26/2021 1612  Last data filed at 11/26/2021 1411  Gross per 24 hour   Intake 480 ml   Output 1575 ml   Net -1095 ml     I/O last 3 completed shifts: In: 480 [P.O.:480]  Out: 250 [facility] [address] [Urine:1575]    Safety Concerns: At Risk for Falls    Impairments/Disabilities:      None    Nutrition Therapy:  Current Nutrition Therapy:   - Oral Diet:  [address] K[address] Oral EVEX:[id number]}    Routes of Feeding: Oral  Liquids: No Restrictions  Daily Fluid Restriction: no  Last Modified Barium Swallow with Video (Video Swallowing Test): not done    Treatments at the Time of Hospital Discharge:   Respiratory Treatments: N/A  Oxygen Therapy:  is not on home oxygen therapy. Ventilator:    - No ventilator support    Rehab Therapies: Physical Therapy and Occupational Therapy  Weight Bearing Status/Restrictions: No weight bearing restirctions  Other Medical Equipment (for information only, NOT a DME order):  walker  Other Treatments: N/A    Patient's personal belongings (please select all that are sent with patient):  Pt discharged with all belongings    RN SIGNATURE:  Electronically signed by Cory Lou RN on 11/29/21 at 7:18 PM EST    CASE MANAGEMENT/SOCIAL WORK SECTION    Inpatient Status Date: ***    Readmission Risk Assessment Score:  Readmission Risk              Risk of Unplanned Readmission:  27           Discharging to Facility/ [facility] [address] [place] [address]   [phone]       / signature: Electronically signed by FELICE Truong on 11/29/21 at 5:03 PM EST    PHYSICIAN SECTION     Prognosis: Good    Condition at Discharge: Stable    Rehab Potential (if transferring to Rehab):     Recommended Labs or Other Treatments After Discharge:     Physician Certification: I certify the above information and transfer of Rosario Natarajan  is necessary for the continuing treatment of the diagnosis listed and that she requires Home Care for less 30 days.      Update Admission H&P: No change in H&P    PHYSICIAN SIGNATURE:  Electronically signed by Marilyn Diego DO on 11/29/21 at 7:01 PM EST No.

## 2023-07-15 NOTE — ED PROVIDER NOTE - CLINICAL SUMMARY MEDICAL DECISION MAKING FREE TEXT BOX
pt presents for wound check for laceration which occurred 2 weeks ago and is healing normally by secondary intention as she did not have any intervention at the time of her injury. NVID. no signs of infection. will d/c.
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
## 2023-07-19 DIAGNOSIS — Z79.4 LONG TERM (CURRENT) USE OF INSULIN: ICD-10-CM

## 2023-07-19 DIAGNOSIS — S61.214A LACERATION WITHOUT FOREIGN BODY OF RIGHT RING FINGER WITHOUT DAMAGE TO NAIL, INITIAL ENCOUNTER: ICD-10-CM
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
## 2023-10-16 NOTE — PATIENT PROFILE ADULT - FUNCTIONAL ASSESSMENT - DAILY ACTIVITY 6.
Refill request received from [facility] in [place], [place].  Medication requested:    Pending Prescriptions:                       Disp   Refills    norethindrone (MICRONOR) 0.35 MG tablet [*84 tab*1            Sig: TAKE 1 TABLET(0.35 MG) BY MOUTH DAILY    Medication last prescribed: 4/24/23  Last visit with [facility] CNM group: 11/09/22  Upcoming appointment(s): None    Refill request sent to on-call CNM for review.   4 = No assist / stand by assistance

## 2023-12-13 VITALS
WEIGHT: 125 LBS | HEART RATE: 116 BPM | DIASTOLIC BLOOD PRESSURE: 84 MMHG | OXYGEN SATURATION: 100 % | RESPIRATION RATE: 19 BRPM | SYSTOLIC BLOOD PRESSURE: 139 MMHG

## 2023-12-13 LAB
LACTATE BLDV-MCNC: 1.6 MMOL/L — SIGNIFICANT CHANGE UP (ref 0.5–2)
LACTATE BLDV-MCNC: 1.6 MMOL/L — SIGNIFICANT CHANGE UP (ref 0.5–2)
PCO2 BLDV: 21 MMHG — LOW (ref 39–42)
PCO2 BLDV: 21 MMHG — LOW (ref 39–42)
PH BLDV: <6.93 — LOW (ref 7.32–7.43)
PH BLDV: <6.93 — LOW (ref 7.32–7.43)
PO2 BLDV: 41 MMHG — SIGNIFICANT CHANGE UP (ref 25–45)
PO2 BLDV: 41 MMHG — SIGNIFICANT CHANGE UP (ref 25–45)
SAO2 % BLDV: 73.4 % — SIGNIFICANT CHANGE UP (ref 67–88)
SAO2 % BLDV: 73.4 % — SIGNIFICANT CHANGE UP (ref 67–88)

## 2023-12-13 PROCEDURE — 99291 CRITICAL CARE FIRST HOUR: CPT

## 2023-12-13 PROCEDURE — 71045 X-RAY EXAM CHEST 1 VIEW: CPT | Mod: 26

## 2023-12-13 RX ORDER — INSULIN HUMAN 100 [IU]/ML
10 INJECTION, SOLUTION SUBCUTANEOUS ONCE
Refills: 0 | Status: COMPLETED | OUTPATIENT
Start: 2023-12-13 | End: 2023-12-13

## 2023-12-13 RX ORDER — SODIUM CHLORIDE 9 MG/ML
1000 INJECTION INTRAMUSCULAR; INTRAVENOUS; SUBCUTANEOUS ONCE
Refills: 0 | Status: COMPLETED | OUTPATIENT
Start: 2023-12-13 | End: 2023-12-13

## 2023-12-13 RX ORDER — INSULIN HUMAN 100 [IU]/ML
10 INJECTION, SOLUTION SUBCUTANEOUS
Qty: 100 | Refills: 0 | Status: DISCONTINUED | OUTPATIENT
Start: 2023-12-13 | End: 2023-12-14

## 2023-12-13 RX ORDER — SODIUM CHLORIDE 9 MG/ML
2000 INJECTION INTRAMUSCULAR; INTRAVENOUS; SUBCUTANEOUS ONCE
Refills: 0 | Status: COMPLETED | OUTPATIENT
Start: 2023-12-13 | End: 2023-12-13

## 2023-12-13 NOTE — ED ADULT TRIAGE NOTE - CHIEF COMPLAINT QUOTE
hyperglycemia.  Parents called 911 when they went to visit their daughter and found her "not right". pt is newly diagnosed DM2 believed to be non compliant with medication. 250Ns given en route by EMS.

## 2023-12-13 NOTE — ED ADULT NURSE NOTE - CAS EDP DISCH DISPOSITION ADMI
Earliest to refill is today - working on this today   [address] [place] [address]/[facility] [address] [place] [address]/[facility]

## 2023-12-13 NOTE — ED PROVIDER NOTE - ATTENDING APP SHARED VISIT CONTRIBUTION OF CARE
22yo F hx of DM1 presents with generalized weakness and cough x2d with noncompliance with insulin for the past 2d.  On exam pt is tachycardic, ill appearing, with dry mucous membranes.  Initial fingerstick in 400s.  Labs consistent with DKA with profound acidosis.  Pt started on IV hydration, insulin gtt, K repletion, bicarb.  Plan for MICU admission.  Viral swab positive for RSV.  CXR clear.  Satting well on RA. 24yo F hx of DM1 presents with generalized weakness and cough x2d with noncompliance with insulin for the past 2d.  On exam pt is tachycardic, ill appearing, with dry mucous membranes.  Initial fingerstick in 400s.  Labs consistent with DKA with profound acidosis.  Pt started on IV hydration, insulin gtt, K repletion, bicarb.  Plan for MICU admission.  Viral swab positive for RSV.  CXR clear.  Satting well on RA. 24yo F hx of DM1 presents with generalized weakness and cough x2d with noncompliance with insulin for the past 2d.  On exam pt is tachycardic, ill appearing, with dry mucous membranes.  Initial fingerstick in 400s.  Labs consistent with DKA with profound acidosis.  Pt started on IV hydration, insulin gtt, K repletion, bicarb.  Plan for MICU admission.  Viral swab positive for RSV.  CXR clear.  Satting well on RA.    The patient was seen immediately upon arrival due to a high probability of imminent or life-threatening deterioration secondary to DKA, which required my direct attention, intervention, and personal management at the bedside. I have personally provided critical care time exclusive of time spent on separately billable procedures. Time includes review of laboratory data, radiology results, discussion with consultants, discussion with the patient's family, and monitoring for potential decompensation.   Critical Care time spent: 45 min

## 2023-12-13 NOTE — ED PROVIDER NOTE - OBJECTIVE STATEMENT
24 yo F with PMHx of type I DM, on insulin ss, last given insulin 2d ago, DKA 2 yrs ago, BIBA accompanied by family for cough and generalized weakness. Pt reports having non productive cough, generalized weakness x 2d. Noted worsening sx today with increased WOB and family found her "not at baseline and off." Denies polyuria, polydipsia, polyphagia, fever, chills, N/V/D/C, abdominal pain, CP, palpitations, focal weakness, HA, dizziness, tremors, change in urinary/bowel functions, rash, AH/VH, and malaise.

## 2023-12-13 NOTE — ED PROVIDER NOTE - CLINICAL SUMMARY MEDICAL DECISION MAKING FREE TEXT BOX
pt with 2d of URI sx, insulin noncompliance at home, noted AMS with increased WOB today, afebrile and non toxic appearing, +[place]ul respiration on arrival, will check labs, VBG, urine/CXR, viral swabs, r/o infectious etiology. Aggressive fluid resuscitation, likely will need insulin/insulin gtt and ICU admission

## 2023-12-13 NOTE — ED ADULT NURSE NOTE - NSFALLUNIVINTERV_ED_ALL_ED
Bed/Stretcher in lowest position, wheels locked, appropriate side rails in place/Call bell, personal items and telephone in reach/Instruct patient to call for assistance before getting out of bed/chair/stretcher/Non-slip footwear applied when patient is off stretcher/[place] to call system/Physically safe environment - no spills, clutter or unnecessary equipment/Purposeful proactive rounding/Room/bathroom lighting operational, light cord in reach Bed/Stretcher in lowest position, wheels locked, appropriate side rails in place/Call bell, personal items and telephone in reach/Instruct patient to call for assistance before getting out of bed/chair/stretcher/Non-slip footwear applied when patient is off stretcher/[place] to call system/Physically safe environment - no spills, clutter or unnecessary equipment/Purposeful proactive rounding/Room/bathroom lighting operational, light cord in reach

## 2023-12-13 NOTE — ED PROVIDER NOTE - PROGRESS NOTE DETAILS
Pt was seen immediately upon arrival due to a high probability of imminent or life-threatening deterioration secondary to hyperglycemia with possible DKA, which required my direct attention, intervention, and personal management. I have personally provided 45 minutes of critical care time exclusive of time spent on separately billable procedures. Time includes review of laboratory data, radiology results, discussion with consultants, and monitoring for potential decompensation. Interventions were performed as documented below K 3.7, AG>27, bicab <6 on CMP, pH 6.8, will add on bicarb gtt, insulin gtt with KCl, pt remains stable and mentating well, additional PIV established also found to be RSV positive, no other bacterial infection on exam, case discussed with Dr. Soto from MICU, accepted for ICU admission at [facility] also found to be RSV positive, no other bacterial infection on exam, case discussed with Dr. Soto from MICU, accepted for ICU admission at [facility] also found to be RSV positive, no other bacterial infection on exam, case discussed with Dr. Soto from MICU, accepted for ICU admission at [facility]. Phos level added on. repeat VBG obtained and sent also found to be RSV positive, no other bacterial infection on exam, case discussed with Dr. Soto from MICU, accepted for ICU admission at [facility]. Phos level added on. repeat VBG obtained and sent K 3.7, AG>27, bicab <6 on CMP, pH 6.8, will add on bicarb gtt, insulin gtt with 10mEq of KCl x 3 rounds, pt remains stable and mentating well, additional PIV established rpt VBG w improved pH. However, FS around similar range. bicarb gtt d/c'd prior to transport (given normalization of pH and likely persistent hyperglycemia in D5 solution, NS infusing)

## 2023-12-13 NOTE — ED ADULT NURSE NOTE - OBJECTIVE STATEMENT
24 y/o female here for eval of hyperglycemia w/ associated AMS. patient was bibems s/p family calling 911 after finding her daughter behaving different from norm. patient is noted to be weak but able to follow commands. F/S elevated at 441 initially. provider aware. 22 y/o female here for eval of hyperglycemia w/ associated AMS. patient was bibems s/p family calling 911 after finding her daughter behaving different from norm. patient is noted to be weak but able to follow commands. F/S elevated at 441 initially. provider aware.

## 2023-12-13 NOTE — ED PROVIDER NOTE - PHYSICAL EXAMINATION
Gen - WDWN F, ill appearing, mildly tachypneic but speaking in full sentences, non-toxic appearing  Skin - warm, dry, intact   HEENT - AT/NC, PERRL, dry mucous membrane, no nasal discharge, airway patent, neck supple and FROM  CV - S1S2, rapid rate, regular rhythm   Resp - CTAB, no r/r/w  GI - soft, ND, NT, no CVAT b/l   MS - No acute or gross deformities noted to extremities. No midline spinal tenderness or step off on palpation  Neuro - AxOx3, no focal neuro deficits, ambulatory without gait disturbance

## 2023-12-14 ENCOUNTER — INPATIENT (INPATIENT)
Facility: HOSPITAL | Age: 23
LOS: 2 days | Discharge: HOME | DRG: 919 | End: 2023-12-17
Attending: INTERNAL MEDICINE | Admitting: INTERNAL MEDICINE
Payer: COMMERCIAL

## 2023-12-14 DIAGNOSIS — E86.1 HYPOVOLEMIA: ICD-10-CM

## 2023-12-14 DIAGNOSIS — D64.9 ANEMIA, UNSPECIFIED: ICD-10-CM

## 2023-12-14 DIAGNOSIS — J06.9 ACUTE UPPER RESPIRATORY INFECTION, UNSPECIFIED: ICD-10-CM

## 2023-12-14 DIAGNOSIS — E10.10 TYPE 1 DIABETES MELLITUS WITH KETOACIDOSIS WITHOUT COMA: ICD-10-CM

## 2023-12-14 DIAGNOSIS — T86.5 COMPLICATIONS OF STEM CELL TRANSPLANT: ICD-10-CM

## 2023-12-14 DIAGNOSIS — E86.0 DEHYDRATION: ICD-10-CM

## 2023-12-14 DIAGNOSIS — E87.6 HYPOKALEMIA: ICD-10-CM

## 2023-12-14 DIAGNOSIS — F32.9 MAJOR DEPRESSIVE DISORDER, SINGLE EPISODE, UNSPECIFIED: ICD-10-CM
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
## 2023-12-14 DIAGNOSIS — E83.51 HYPOCALCEMIA: ICD-10-CM

## 2023-12-14 DIAGNOSIS — B97.4 RESPIRATORY SYNCYTIAL VIRUS AS THE CAUSE OF DISEASES CLASSIFIED ELSEWHERE: ICD-10-CM

## 2023-12-14 DIAGNOSIS — Z79.4 LONG TERM (CURRENT) USE OF INSULIN: ICD-10-CM

## 2023-12-14 LAB
A1C WITH ESTIMATED AVERAGE GLUCOSE RESULT: 14.1 % — HIGH (ref 4–5.6)
A1C WITH ESTIMATED AVERAGE GLUCOSE RESULT: 14.1 % — HIGH (ref 4–5.6)
ALBUMIN SERPL ELPH-MCNC: 3.9 G/DL — SIGNIFICANT CHANGE UP (ref 3.4–5)
ALBUMIN SERPL ELPH-MCNC: 3.9 G/DL — SIGNIFICANT CHANGE UP (ref 3.4–5)
ALBUMIN SERPL ELPH-MCNC: 4 G/DL — SIGNIFICANT CHANGE UP (ref 3.3–5)
ALBUMIN SERPL ELPH-MCNC: 4 G/DL — SIGNIFICANT CHANGE UP (ref 3.3–5)
ALP SERPL-CCNC: 161 U/L — HIGH (ref 40–120)
ALP SERPL-CCNC: 161 U/L — HIGH (ref 40–120)
ALP SERPL-CCNC: 197 U/L — HIGH (ref 40–120)
ALP SERPL-CCNC: 197 U/L — HIGH (ref 40–120)
ALT FLD-CCNC: 17 U/L — SIGNIFICANT CHANGE UP (ref 12–42)
ALT FLD-CCNC: 17 U/L — SIGNIFICANT CHANGE UP (ref 12–42)
ALT FLD-CCNC: 8 U/L — LOW (ref 10–45)
ALT FLD-CCNC: 8 U/L — LOW (ref 10–45)
ANION GAP SERPL CALC-SCNC: 12 MMOL/L — SIGNIFICANT CHANGE UP (ref 5–17)
ANION GAP SERPL CALC-SCNC: 12 MMOL/L — SIGNIFICANT CHANGE UP (ref 5–17)
ANION GAP SERPL CALC-SCNC: 22 MMOL/L — HIGH (ref 5–17)
ANION GAP SERPL CALC-SCNC: 22 MMOL/L — HIGH (ref 5–17)
ANION GAP SERPL CALC-SCNC: 23 MMOL/L — HIGH (ref 5–17)
ANION GAP SERPL CALC-SCNC: 24 MMOL/L — HIGH (ref 5–17)
ANION GAP SERPL CALC-SCNC: 24 MMOL/L — HIGH (ref 5–17)
ANION GAP SERPL CALC-SCNC: >27 MMOL/L — HIGH (ref 9–16)
ANION GAP SERPL CALC-SCNC: >27 MMOL/L — HIGH (ref 9–16)
APPEARANCE UR: CLEAR — SIGNIFICANT CHANGE UP
APPEARANCE UR: CLEAR — SIGNIFICANT CHANGE UP
APTT BLD: 27.1 SEC — SIGNIFICANT CHANGE UP (ref 24.5–35.6)
APTT BLD: 27.1 SEC — SIGNIFICANT CHANGE UP (ref 24.5–35.6)
AST SERPL-CCNC: 11 U/L — SIGNIFICANT CHANGE UP (ref 10–40)
AST SERPL-CCNC: 11 U/L — SIGNIFICANT CHANGE UP (ref 10–40)
AST SERPL-CCNC: 18 U/L — SIGNIFICANT CHANGE UP (ref 15–37)
AST SERPL-CCNC: 18 U/L — SIGNIFICANT CHANGE UP (ref 15–37)
B-OH-BUTYR SERPL-SCNC: 5.7 MMOL/L — HIGH
B-OH-BUTYR SERPL-SCNC: 5.7 MMOL/L — HIGH
B-OH-BUTYR SERPL-SCNC: 8.7 MMOL/L — HIGH
B-OH-BUTYR SERPL-SCNC: 8.7 MMOL/L — HIGH
BASOPHILS # BLD AUTO: 0 K/UL — SIGNIFICANT CHANGE UP (ref 0–0.2)
BASOPHILS # BLD AUTO: 0 K/UL — SIGNIFICANT CHANGE UP (ref 0–0.2)
BASOPHILS # BLD AUTO: 0.04 K/UL — SIGNIFICANT CHANGE UP (ref 0–0.2)
BASOPHILS # BLD AUTO: 0.04 K/UL — SIGNIFICANT CHANGE UP (ref 0–0.2)
BASOPHILS NFR BLD AUTO: 0 % — SIGNIFICANT CHANGE UP (ref 0–2)
BASOPHILS NFR BLD AUTO: 0 % — SIGNIFICANT CHANGE UP (ref 0–2)
BASOPHILS NFR BLD AUTO: 0.5 % — SIGNIFICANT CHANGE UP (ref 0–2)
BASOPHILS NFR BLD AUTO: 0.5 % — SIGNIFICANT CHANGE UP (ref 0–2)
BILIRUB SERPL-MCNC: 0.2 MG/DL — SIGNIFICANT CHANGE UP (ref 0.2–1.2)
BILIRUB SERPL-MCNC: 0.2 MG/DL — SIGNIFICANT CHANGE UP (ref 0.2–1.2)
BILIRUB SERPL-MCNC: 0.4 MG/DL — SIGNIFICANT CHANGE UP (ref 0.2–1.2)
BILIRUB SERPL-MCNC: 0.4 MG/DL — SIGNIFICANT CHANGE UP (ref 0.2–1.2)
BILIRUB UR-MCNC: NEGATIVE — SIGNIFICANT CHANGE UP
BILIRUB UR-MCNC: NEGATIVE — SIGNIFICANT CHANGE UP
BLD GP AB SCN SERPL QL: NEGATIVE — SIGNIFICANT CHANGE UP
BLD GP AB SCN SERPL QL: NEGATIVE — SIGNIFICANT CHANGE UP
BUN SERPL-MCNC: 11 MG/DL — SIGNIFICANT CHANGE UP (ref 7–23)
BUN SERPL-MCNC: 11 MG/DL — SIGNIFICANT CHANGE UP (ref 7–23)
BUN SERPL-MCNC: 12 MG/DL — SIGNIFICANT CHANGE UP (ref 7–23)
BUN SERPL-MCNC: 12 MG/DL — SIGNIFICANT CHANGE UP (ref 7–23)
BUN SERPL-MCNC: 14 MG/DL — SIGNIFICANT CHANGE UP (ref 7–23)
BUN SERPL-MCNC: 15 MG/DL — SIGNIFICANT CHANGE UP (ref 7–23)
BUN SERPL-MCNC: 15 MG/DL — SIGNIFICANT CHANGE UP (ref 7–23)
BUN SERPL-MCNC: 18 MG/DL — SIGNIFICANT CHANGE UP (ref 7–23)
BUN SERPL-MCNC: 18 MG/DL — SIGNIFICANT CHANGE UP (ref 7–23)
BUN SERPL-MCNC: 9 MG/DL — SIGNIFICANT CHANGE UP (ref 7–23)
BUN SERPL-MCNC: 9 MG/DL — SIGNIFICANT CHANGE UP (ref 7–23)
CALCIUM SERPL-MCNC: 7.5 MG/DL — LOW (ref 8.4–10.5)
CALCIUM SERPL-MCNC: 7.5 MG/DL — LOW (ref 8.4–10.5)
CALCIUM SERPL-MCNC: 7.6 MG/DL — LOW (ref 8.4–10.5)
CALCIUM SERPL-MCNC: 7.6 MG/DL — LOW (ref 8.4–10.5)
CALCIUM SERPL-MCNC: 7.7 MG/DL — LOW (ref 8.4–10.5)
CALCIUM SERPL-MCNC: 7.7 MG/DL — LOW (ref 8.4–10.5)
CALCIUM SERPL-MCNC: 7.8 MG/DL — LOW (ref 8.4–10.5)
CALCIUM SERPL-MCNC: 7.9 MG/DL — LOW (ref 8.4–10.5)
CALCIUM SERPL-MCNC: 7.9 MG/DL — LOW (ref 8.4–10.5)
CALCIUM SERPL-MCNC: 8.6 MG/DL — SIGNIFICANT CHANGE UP (ref 8.5–10.5)
CALCIUM SERPL-MCNC: 8.6 MG/DL — SIGNIFICANT CHANGE UP (ref 8.5–10.5)
CHLORIDE SERPL-SCNC: 101 MMOL/L — SIGNIFICANT CHANGE UP (ref 96–108)
CHLORIDE SERPL-SCNC: 101 MMOL/L — SIGNIFICANT CHANGE UP (ref 96–108)
CHLORIDE SERPL-SCNC: 103 MMOL/L — SIGNIFICANT CHANGE UP (ref 96–108)
CHLORIDE SERPL-SCNC: 103 MMOL/L — SIGNIFICANT CHANGE UP (ref 96–108)
CHLORIDE SERPL-SCNC: 108 MMOL/L — SIGNIFICANT CHANGE UP (ref 96–108)
CHLORIDE SERPL-SCNC: 108 MMOL/L — SIGNIFICANT CHANGE UP (ref 96–108)
CHLORIDE SERPL-SCNC: 109 MMOL/L — HIGH (ref 96–108)
CHLORIDE SERPL-SCNC: 109 MMOL/L — HIGH (ref 96–108)
CHLORIDE SERPL-SCNC: 112 MMOL/L — HIGH (ref 96–108)
CHLORIDE SERPL-SCNC: 112 MMOL/L — HIGH (ref 96–108)
CHLORIDE SERPL-SCNC: 114 MMOL/L — HIGH (ref 96–108)
CHLORIDE SERPL-SCNC: 114 MMOL/L — HIGH (ref 96–108)
CHLORIDE SERPL-SCNC: 115 MMOL/L — HIGH (ref 96–108)
CHLORIDE SERPL-SCNC: 115 MMOL/L — HIGH (ref 96–108)
CO2 SERPL-SCNC: 10 MMOL/L — CRITICAL LOW (ref 22–31)
CO2 SERPL-SCNC: 10 MMOL/L — CRITICAL LOW (ref 22–31)
CO2 SERPL-SCNC: 14 MMOL/L — LOW (ref 22–31)
CO2 SERPL-SCNC: 14 MMOL/L — LOW (ref 22–31)
CO2 SERPL-SCNC: 4 MMOL/L — CRITICAL LOW (ref 22–31)
CO2 SERPL-SCNC: 4 MMOL/L — CRITICAL LOW (ref 22–31)
CO2 SERPL-SCNC: 5 MMOL/L — CRITICAL LOW (ref 22–31)
CO2 SERPL-SCNC: 5 MMOL/L — CRITICAL LOW (ref 22–31)
CO2 SERPL-SCNC: 6 MMOL/L — CRITICAL LOW (ref 22–31)
CO2 SERPL-SCNC: 6 MMOL/L — CRITICAL LOW (ref 22–31)
CO2 SERPL-SCNC: 7 MMOL/L — CRITICAL LOW (ref 22–31)
CO2 SERPL-SCNC: 7 MMOL/L — CRITICAL LOW (ref 22–31)
CO2 SERPL-SCNC: <6 MMOL/L — CRITICAL LOW (ref 22–31)
CO2 SERPL-SCNC: <6 MMOL/L — CRITICAL LOW (ref 22–31)
COLOR SPEC: YELLOW — SIGNIFICANT CHANGE UP
COLOR SPEC: YELLOW — SIGNIFICANT CHANGE UP
CREAT SERPL-MCNC: 0.62 MG/DL — SIGNIFICANT CHANGE UP (ref 0.5–1.3)
CREAT SERPL-MCNC: 0.62 MG/DL — SIGNIFICANT CHANGE UP (ref 0.5–1.3)
CREAT SERPL-MCNC: 0.71 MG/DL — SIGNIFICANT CHANGE UP (ref 0.5–1.3)
CREAT SERPL-MCNC: 0.71 MG/DL — SIGNIFICANT CHANGE UP (ref 0.5–1.3)
CREAT SERPL-MCNC: 0.75 MG/DL — SIGNIFICANT CHANGE UP (ref 0.5–1.3)
CREAT SERPL-MCNC: 0.75 MG/DL — SIGNIFICANT CHANGE UP (ref 0.5–1.3)
CREAT SERPL-MCNC: 0.77 MG/DL — SIGNIFICANT CHANGE UP (ref 0.5–1.3)
CREAT SERPL-MCNC: 0.77 MG/DL — SIGNIFICANT CHANGE UP (ref 0.5–1.3)
CREAT SERPL-MCNC: 0.79 MG/DL — SIGNIFICANT CHANGE UP (ref 0.5–1.3)
CREAT SERPL-MCNC: 0.79 MG/DL — SIGNIFICANT CHANGE UP (ref 0.5–1.3)
CREAT SERPL-MCNC: 0.82 MG/DL — SIGNIFICANT CHANGE UP (ref 0.5–1.3)
CREAT SERPL-MCNC: 0.82 MG/DL — SIGNIFICANT CHANGE UP (ref 0.5–1.3)
CREAT SERPL-MCNC: 1.33 MG/DL — HIGH (ref 0.5–1.3)
CREAT SERPL-MCNC: 1.33 MG/DL — HIGH (ref 0.5–1.3)
CRP SERPL-MCNC: 11 MG/L — HIGH (ref 0–9)
CRP SERPL-MCNC: 11 MG/L — HIGH (ref 0–9)
DIFF PNL FLD: ABNORMAL
DIFF PNL FLD: ABNORMAL
EGFR: 103 ML/MIN/1.73M2 — SIGNIFICANT CHANGE UP
EGFR: 103 ML/MIN/1.73M2 — SIGNIFICANT CHANGE UP
EGFR: 108 ML/MIN/1.73M2 — SIGNIFICANT CHANGE UP
EGFR: 108 ML/MIN/1.73M2 — SIGNIFICANT CHANGE UP
EGFR: 111 ML/MIN/1.73M2 — SIGNIFICANT CHANGE UP
EGFR: 111 ML/MIN/1.73M2 — SIGNIFICANT CHANGE UP
EGFR: 115 ML/MIN/1.73M2 — SIGNIFICANT CHANGE UP
EGFR: 115 ML/MIN/1.73M2 — SIGNIFICANT CHANGE UP
EGFR: 122 ML/MIN/1.73M2 — SIGNIFICANT CHANGE UP
EGFR: 122 ML/MIN/1.73M2 — SIGNIFICANT CHANGE UP
EGFR: 128 ML/MIN/1.73M2 — SIGNIFICANT CHANGE UP
EGFR: 128 ML/MIN/1.73M2 — SIGNIFICANT CHANGE UP
EGFR: 58 ML/MIN/1.73M2 — LOW
EGFR: 58 ML/MIN/1.73M2 — LOW
EOSINOPHIL # BLD AUTO: 0 K/UL — SIGNIFICANT CHANGE UP (ref 0–0.5)
EOSINOPHIL NFR BLD AUTO: 0 % — SIGNIFICANT CHANGE UP (ref 0–6)
ESTIMATED AVERAGE GLUCOSE: 358 MG/DL — HIGH (ref 68–114)
ESTIMATED AVERAGE GLUCOSE: 358 MG/DL — HIGH (ref 68–114)
FLUAV AG NPH QL: SIGNIFICANT CHANGE UP
FLUAV AG NPH QL: SIGNIFICANT CHANGE UP
FLUBV AG NPH QL: SIGNIFICANT CHANGE UP
FLUBV AG NPH QL: SIGNIFICANT CHANGE UP
GLUCOSE BLDC GLUCOMTR-MCNC: 133 MG/DL — HIGH (ref 70–99)
GLUCOSE BLDC GLUCOMTR-MCNC: 133 MG/DL — HIGH (ref 70–99)
GLUCOSE BLDC GLUCOMTR-MCNC: 155 MG/DL — HIGH (ref 70–99)
GLUCOSE BLDC GLUCOMTR-MCNC: 155 MG/DL — HIGH (ref 70–99)
GLUCOSE BLDC GLUCOMTR-MCNC: 175 MG/DL — HIGH (ref 70–99)
GLUCOSE BLDC GLUCOMTR-MCNC: 175 MG/DL — HIGH (ref 70–99)
GLUCOSE BLDC GLUCOMTR-MCNC: 177 MG/DL — HIGH (ref 70–99)
GLUCOSE BLDC GLUCOMTR-MCNC: 177 MG/DL — HIGH (ref 70–99)
GLUCOSE BLDC GLUCOMTR-MCNC: 184 MG/DL — HIGH (ref 70–99)
GLUCOSE BLDC GLUCOMTR-MCNC: 184 MG/DL — HIGH (ref 70–99)
GLUCOSE BLDC GLUCOMTR-MCNC: 188 MG/DL — HIGH (ref 70–99)
GLUCOSE BLDC GLUCOMTR-MCNC: 188 MG/DL — HIGH (ref 70–99)
GLUCOSE BLDC GLUCOMTR-MCNC: 192 MG/DL — HIGH (ref 70–99)
GLUCOSE BLDC GLUCOMTR-MCNC: 192 MG/DL — HIGH (ref 70–99)
GLUCOSE BLDC GLUCOMTR-MCNC: 209 MG/DL — HIGH (ref 70–99)
GLUCOSE BLDC GLUCOMTR-MCNC: 209 MG/DL — HIGH (ref 70–99)
GLUCOSE BLDC GLUCOMTR-MCNC: 218 MG/DL — HIGH (ref 70–99)
GLUCOSE BLDC GLUCOMTR-MCNC: 223 MG/DL — HIGH (ref 70–99)
GLUCOSE BLDC GLUCOMTR-MCNC: 223 MG/DL — HIGH (ref 70–99)
GLUCOSE BLDC GLUCOMTR-MCNC: 228 MG/DL — HIGH (ref 70–99)
GLUCOSE BLDC GLUCOMTR-MCNC: 228 MG/DL — HIGH (ref 70–99)
GLUCOSE BLDC GLUCOMTR-MCNC: 234 MG/DL — HIGH (ref 70–99)
GLUCOSE BLDC GLUCOMTR-MCNC: 243 MG/DL — HIGH (ref 70–99)
GLUCOSE BLDC GLUCOMTR-MCNC: 249 MG/DL — HIGH (ref 70–99)
GLUCOSE BLDC GLUCOMTR-MCNC: 249 MG/DL — HIGH (ref 70–99)
GLUCOSE BLDC GLUCOMTR-MCNC: 253 MG/DL — HIGH (ref 70–99)
GLUCOSE BLDC GLUCOMTR-MCNC: 253 MG/DL — HIGH (ref 70–99)
GLUCOSE BLDC GLUCOMTR-MCNC: 268 MG/DL — HIGH (ref 70–99)
GLUCOSE BLDC GLUCOMTR-MCNC: 268 MG/DL — HIGH (ref 70–99)
GLUCOSE BLDC GLUCOMTR-MCNC: 273 MG/DL — HIGH (ref 70–99)
GLUCOSE BLDC GLUCOMTR-MCNC: 273 MG/DL — HIGH (ref 70–99)
GLUCOSE BLDC GLUCOMTR-MCNC: 369 MG/DL — HIGH (ref 70–99)
GLUCOSE BLDC GLUCOMTR-MCNC: 369 MG/DL — HIGH (ref 70–99)
GLUCOSE BLDC GLUCOMTR-MCNC: 410 MG/DL — HIGH (ref 70–99)
GLUCOSE BLDC GLUCOMTR-MCNC: 410 MG/DL — HIGH (ref 70–99)
GLUCOSE BLDC GLUCOMTR-MCNC: 456 MG/DL — CRITICAL HIGH (ref 70–99)
GLUCOSE BLDC GLUCOMTR-MCNC: 456 MG/DL — CRITICAL HIGH (ref 70–99)
GLUCOSE SERPL-MCNC: 228 MG/DL — HIGH (ref 70–99)
GLUCOSE SERPL-MCNC: 228 MG/DL — HIGH (ref 70–99)
GLUCOSE SERPL-MCNC: 237 MG/DL — HIGH (ref 70–99)
GLUCOSE SERPL-MCNC: 237 MG/DL — HIGH (ref 70–99)
GLUCOSE SERPL-MCNC: 238 MG/DL — HIGH (ref 70–99)
GLUCOSE SERPL-MCNC: 238 MG/DL — HIGH (ref 70–99)
GLUCOSE SERPL-MCNC: 243 MG/DL — HIGH (ref 70–99)
GLUCOSE SERPL-MCNC: 243 MG/DL — HIGH (ref 70–99)
GLUCOSE SERPL-MCNC: 256 MG/DL — HIGH (ref 70–99)
GLUCOSE SERPL-MCNC: 256 MG/DL — HIGH (ref 70–99)
GLUCOSE SERPL-MCNC: 300 MG/DL — HIGH (ref 70–99)
GLUCOSE SERPL-MCNC: 300 MG/DL — HIGH (ref 70–99)
GLUCOSE SERPL-MCNC: 478 MG/DL — CRITICAL HIGH (ref 70–99)
GLUCOSE SERPL-MCNC: 478 MG/DL — CRITICAL HIGH (ref 70–99)
GLUCOSE UR QL: >=1000 MG/DL
GLUCOSE UR QL: >=1000 MG/DL
HCG UR QL: NEGATIVE — SIGNIFICANT CHANGE UP
HCG UR QL: NEGATIVE — SIGNIFICANT CHANGE UP
HCT VFR BLD CALC: 36.7 % — SIGNIFICANT CHANGE UP (ref 34.5–45)
HCT VFR BLD CALC: 36.7 % — SIGNIFICANT CHANGE UP (ref 34.5–45)
HCT VFR BLD CALC: 44.7 % — SIGNIFICANT CHANGE UP (ref 34.5–45)
HCT VFR BLD CALC: 44.7 % — SIGNIFICANT CHANGE UP (ref 34.5–45)
HGB BLD-MCNC: 13 G/DL — SIGNIFICANT CHANGE UP (ref 11.5–15.5)
HGB BLD-MCNC: 13 G/DL — SIGNIFICANT CHANGE UP (ref 11.5–15.5)
HGB BLD-MCNC: 15.1 G/DL — SIGNIFICANT CHANGE UP (ref 11.5–15.5)
HGB BLD-MCNC: 15.1 G/DL — SIGNIFICANT CHANGE UP (ref 11.5–15.5)
IMM GRANULOCYTES NFR BLD AUTO: 1.5 % — HIGH (ref 0–0.9)
IMM GRANULOCYTES NFR BLD AUTO: 1.5 % — HIGH (ref 0–0.9)
INR BLD: 0.77 — LOW (ref 0.85–1.18)
INR BLD: 0.77 — LOW (ref 0.85–1.18)
KETONES UR-MCNC: >=160 MG/DL
KETONES UR-MCNC: >=160 MG/DL
LEUKOCYTE ESTERASE UR-ACNC: NEGATIVE — SIGNIFICANT CHANGE UP
LEUKOCYTE ESTERASE UR-ACNC: NEGATIVE — SIGNIFICANT CHANGE UP
LYMPHOCYTES # BLD AUTO: 1.45 K/UL — SIGNIFICANT CHANGE UP (ref 1–3.3)
LYMPHOCYTES # BLD AUTO: 1.45 K/UL — SIGNIFICANT CHANGE UP (ref 1–3.3)
LYMPHOCYTES # BLD AUTO: 1.56 K/UL — SIGNIFICANT CHANGE UP (ref 1–3.3)
LYMPHOCYTES # BLD AUTO: 1.56 K/UL — SIGNIFICANT CHANGE UP (ref 1–3.3)
LYMPHOCYTES # BLD AUTO: 15 % — SIGNIFICANT CHANGE UP (ref 13–44)
LYMPHOCYTES # BLD AUTO: 15 % — SIGNIFICANT CHANGE UP (ref 13–44)
LYMPHOCYTES # BLD AUTO: 17 % — SIGNIFICANT CHANGE UP (ref 13–44)
LYMPHOCYTES # BLD AUTO: 17 % — SIGNIFICANT CHANGE UP (ref 13–44)
MAGNESIUM SERPL-MCNC: 1.8 MG/DL — SIGNIFICANT CHANGE UP (ref 1.6–2.6)
MAGNESIUM SERPL-MCNC: 1.9 MG/DL — SIGNIFICANT CHANGE UP (ref 1.6–2.6)
MAGNESIUM SERPL-MCNC: 2 MG/DL — SIGNIFICANT CHANGE UP (ref 1.6–2.6)
MAGNESIUM SERPL-MCNC: 2 MG/DL — SIGNIFICANT CHANGE UP (ref 1.6–2.6)
MAGNESIUM SERPL-MCNC: 2.1 MG/DL — SIGNIFICANT CHANGE UP (ref 1.6–2.6)
MAGNESIUM SERPL-MCNC: 2.1 MG/DL — SIGNIFICANT CHANGE UP (ref 1.6–2.6)
MAGNESIUM SERPL-MCNC: 2.5 MG/DL — SIGNIFICANT CHANGE UP (ref 1.6–2.6)
MAGNESIUM SERPL-MCNC: 2.5 MG/DL — SIGNIFICANT CHANGE UP (ref 1.6–2.6)
MCHC RBC-ENTMCNC: 28 PG — SIGNIFICANT CHANGE UP (ref 27–34)
MCHC RBC-ENTMCNC: 28 PG — SIGNIFICANT CHANGE UP (ref 27–34)
MCHC RBC-ENTMCNC: 28.8 PG — SIGNIFICANT CHANGE UP (ref 27–34)
MCHC RBC-ENTMCNC: 28.8 PG — SIGNIFICANT CHANGE UP (ref 27–34)
MCHC RBC-ENTMCNC: 33.8 GM/DL — SIGNIFICANT CHANGE UP (ref 32–36)
MCHC RBC-ENTMCNC: 33.8 GM/DL — SIGNIFICANT CHANGE UP (ref 32–36)
MCHC RBC-ENTMCNC: 35.4 GM/DL — SIGNIFICANT CHANGE UP (ref 32–36)
MCHC RBC-ENTMCNC: 35.4 GM/DL — SIGNIFICANT CHANGE UP (ref 32–36)
MCV RBC AUTO: 79.1 FL — LOW (ref 80–100)
MCV RBC AUTO: 79.1 FL — LOW (ref 80–100)
MCV RBC AUTO: 85.1 FL — SIGNIFICANT CHANGE UP (ref 80–100)
MCV RBC AUTO: 85.1 FL — SIGNIFICANT CHANGE UP (ref 80–100)
MONOCYTES # BLD AUTO: 0.38 K/UL — SIGNIFICANT CHANGE UP (ref 0–0.9)
MONOCYTES # BLD AUTO: 0.38 K/UL — SIGNIFICANT CHANGE UP (ref 0–0.9)
MONOCYTES # BLD AUTO: 0.42 K/UL — SIGNIFICANT CHANGE UP (ref 0–0.9)
MONOCYTES # BLD AUTO: 0.42 K/UL — SIGNIFICANT CHANGE UP (ref 0–0.9)
MONOCYTES NFR BLD AUTO: 4 % — SIGNIFICANT CHANGE UP (ref 2–14)
MONOCYTES NFR BLD AUTO: 4 % — SIGNIFICANT CHANGE UP (ref 2–14)
MONOCYTES NFR BLD AUTO: 4.4 % — SIGNIFICANT CHANGE UP (ref 2–14)
MONOCYTES NFR BLD AUTO: 4.4 % — SIGNIFICANT CHANGE UP (ref 2–14)
NEUTROPHILS # BLD AUTO: 6.55 K/UL — SIGNIFICANT CHANGE UP (ref 1.8–7.4)
NEUTROPHILS # BLD AUTO: 6.55 K/UL — SIGNIFICANT CHANGE UP (ref 1.8–7.4)
NEUTROPHILS # BLD AUTO: 8.43 K/UL — HIGH (ref 1.8–7.4)
NEUTROPHILS # BLD AUTO: 8.43 K/UL — HIGH (ref 1.8–7.4)
NEUTROPHILS NFR BLD AUTO: 76 % — SIGNIFICANT CHANGE UP (ref 43–77)
NEUTROPHILS NFR BLD AUTO: 76 % — SIGNIFICANT CHANGE UP (ref 43–77)
NEUTROPHILS NFR BLD AUTO: 76.6 % — SIGNIFICANT CHANGE UP (ref 43–77)
NEUTROPHILS NFR BLD AUTO: 76.6 % — SIGNIFICANT CHANGE UP (ref 43–77)
NITRITE UR-MCNC: NEGATIVE — SIGNIFICANT CHANGE UP
NITRITE UR-MCNC: NEGATIVE — SIGNIFICANT CHANGE UP
NRBC # BLD: 0 /100 WBCS — SIGNIFICANT CHANGE UP (ref 0–0)
NRBC # BLD: 0 /100 WBCS — SIGNIFICANT CHANGE UP (ref 0–0)
NRBC # BLD: SIGNIFICANT CHANGE UP /100 WBCS (ref 0–0)
NRBC # BLD: SIGNIFICANT CHANGE UP /100 WBCS (ref 0–0)
PCO2 BLDV: 89 MMHG — HIGH (ref 39–42)
PCO2 BLDV: 89 MMHG — HIGH (ref 39–42)
PH BLDV: 7.49 — HIGH (ref 7.32–7.43)
PH BLDV: 7.49 — HIGH (ref 7.32–7.43)
PH UR: 5 — SIGNIFICANT CHANGE UP (ref 5–8)
PH UR: 5 — SIGNIFICANT CHANGE UP (ref 5–8)
PHOSPHATE SERPL-MCNC: 0.5 MG/DL — CRITICAL LOW (ref 2.5–4.5)
PHOSPHATE SERPL-MCNC: 0.5 MG/DL — CRITICAL LOW (ref 2.5–4.5)
PHOSPHATE SERPL-MCNC: 1.2 MG/DL — LOW (ref 2.5–4.5)
PHOSPHATE SERPL-MCNC: 1.2 MG/DL — LOW (ref 2.5–4.5)
PHOSPHATE SERPL-MCNC: 1.3 MG/DL — LOW (ref 2.5–4.5)
PHOSPHATE SERPL-MCNC: 1.3 MG/DL — LOW (ref 2.5–4.5)
PHOSPHATE SERPL-MCNC: 1.6 MG/DL — LOW (ref 2.5–4.5)
PHOSPHATE SERPL-MCNC: 1.6 MG/DL — LOW (ref 2.5–4.5)
PHOSPHATE SERPL-MCNC: 2 MG/DL — LOW (ref 2.5–4.5)
PHOSPHATE SERPL-MCNC: 2 MG/DL — LOW (ref 2.5–4.5)
PHOSPHATE SERPL-MCNC: 2.5 MG/DL — SIGNIFICANT CHANGE UP (ref 2.5–4.5)
PHOSPHATE SERPL-MCNC: 2.5 MG/DL — SIGNIFICANT CHANGE UP (ref 2.5–4.5)
PHOSPHATE SERPL-MCNC: 4.5 MG/DL — SIGNIFICANT CHANGE UP (ref 2.5–4.5)
PHOSPHATE SERPL-MCNC: 4.5 MG/DL — SIGNIFICANT CHANGE UP (ref 2.5–4.5)
PLATELET # BLD AUTO: 217 K/UL — SIGNIFICANT CHANGE UP (ref 150–400)
PLATELET # BLD AUTO: 217 K/UL — SIGNIFICANT CHANGE UP (ref 150–400)
PLATELET # BLD AUTO: 293 K/UL — SIGNIFICANT CHANGE UP (ref 150–400)
PLATELET # BLD AUTO: 293 K/UL — SIGNIFICANT CHANGE UP (ref 150–400)
PO2 BLDV: <35 MMHG — SIGNIFICANT CHANGE UP (ref 25–45)
PO2 BLDV: <35 MMHG — SIGNIFICANT CHANGE UP (ref 25–45)
POTASSIUM SERPL-MCNC: 2.7 MMOL/L — CRITICAL LOW (ref 3.5–5.3)
POTASSIUM SERPL-MCNC: 3.4 MMOL/L — LOW (ref 3.5–5.3)
POTASSIUM SERPL-MCNC: 3.4 MMOL/L — LOW (ref 3.5–5.3)
POTASSIUM SERPL-MCNC: 3.6 MMOL/L — SIGNIFICANT CHANGE UP (ref 3.5–5.3)
POTASSIUM SERPL-MCNC: 3.6 MMOL/L — SIGNIFICANT CHANGE UP (ref 3.5–5.3)
POTASSIUM SERPL-MCNC: 3.7 MMOL/L — SIGNIFICANT CHANGE UP (ref 3.5–5.3)
POTASSIUM SERPL-MCNC: 3.7 MMOL/L — SIGNIFICANT CHANGE UP (ref 3.5–5.3)
POTASSIUM SERPL-MCNC: 3.8 MMOL/L — SIGNIFICANT CHANGE UP (ref 3.5–5.3)
POTASSIUM SERPL-SCNC: 2.7 MMOL/L — CRITICAL LOW (ref 3.5–5.3)
POTASSIUM SERPL-SCNC: 3.4 MMOL/L — LOW (ref 3.5–5.3)
POTASSIUM SERPL-SCNC: 3.4 MMOL/L — LOW (ref 3.5–5.3)
POTASSIUM SERPL-SCNC: 3.6 MMOL/L — SIGNIFICANT CHANGE UP (ref 3.5–5.3)
POTASSIUM SERPL-SCNC: 3.6 MMOL/L — SIGNIFICANT CHANGE UP (ref 3.5–5.3)
POTASSIUM SERPL-SCNC: 3.7 MMOL/L — SIGNIFICANT CHANGE UP (ref 3.5–5.3)
POTASSIUM SERPL-SCNC: 3.7 MMOL/L — SIGNIFICANT CHANGE UP (ref 3.5–5.3)
POTASSIUM SERPL-SCNC: 3.8 MMOL/L — SIGNIFICANT CHANGE UP (ref 3.5–5.3)
PROT SERPL-MCNC: 6.6 G/DL — SIGNIFICANT CHANGE UP (ref 6–8.3)
PROT SERPL-MCNC: 6.6 G/DL — SIGNIFICANT CHANGE UP (ref 6–8.3)
PROT SERPL-MCNC: 8 G/DL — SIGNIFICANT CHANGE UP (ref 6.4–8.2)
PROT SERPL-MCNC: 8 G/DL — SIGNIFICANT CHANGE UP (ref 6.4–8.2)
PROT UR-MCNC: 30 MG/DL
PROT UR-MCNC: 30 MG/DL
PROTHROM AB SERPL-ACNC: 8.9 SEC — LOW (ref 9.5–13)
PROTHROM AB SERPL-ACNC: 8.9 SEC — LOW (ref 9.5–13)
RBC # BLD: 4.64 M/UL — SIGNIFICANT CHANGE UP (ref 3.8–5.2)
RBC # BLD: 4.64 M/UL — SIGNIFICANT CHANGE UP (ref 3.8–5.2)
RBC # BLD: 5.25 M/UL — HIGH (ref 3.8–5.2)
RBC # BLD: 5.25 M/UL — HIGH (ref 3.8–5.2)
RBC # FLD: 13.1 % — SIGNIFICANT CHANGE UP (ref 10.3–14.5)
RBC # FLD: 13.1 % — SIGNIFICANT CHANGE UP (ref 10.3–14.5)
RBC # FLD: 13.7 % — SIGNIFICANT CHANGE UP (ref 10.3–14.5)
RBC # FLD: 13.7 % — SIGNIFICANT CHANGE UP (ref 10.3–14.5)
RH IG SCN BLD-IMP: POSITIVE — SIGNIFICANT CHANGE UP
RH IG SCN BLD-IMP: POSITIVE — SIGNIFICANT CHANGE UP
RSV RNA NPH QL NAA+NON-PROBE: DETECTED
RSV RNA NPH QL NAA+NON-PROBE: DETECTED
S PYO AG SPEC QL IA: NEGATIVE — SIGNIFICANT CHANGE UP
S PYO AG SPEC QL IA: NEGATIVE — SIGNIFICANT CHANGE UP
SAO2 % BLDV: 75.9 % — SIGNIFICANT CHANGE UP (ref 67–88)
SAO2 % BLDV: 75.9 % — SIGNIFICANT CHANGE UP (ref 67–88)
SARS-COV-2 RNA SPEC QL NAA+PROBE: SIGNIFICANT CHANGE UP
SARS-COV-2 RNA SPEC QL NAA+PROBE: SIGNIFICANT CHANGE UP
SODIUM SERPL-SCNC: 134 MMOL/L — SIGNIFICANT CHANGE UP (ref 132–145)
SODIUM SERPL-SCNC: 134 MMOL/L — SIGNIFICANT CHANGE UP (ref 132–145)
SODIUM SERPL-SCNC: 136 MMOL/L — SIGNIFICANT CHANGE UP (ref 135–145)
SODIUM SERPL-SCNC: 136 MMOL/L — SIGNIFICANT CHANGE UP (ref 135–145)
SODIUM SERPL-SCNC: 137 MMOL/L — SIGNIFICANT CHANGE UP (ref 135–145)
SODIUM SERPL-SCNC: 141 MMOL/L — SIGNIFICANT CHANGE UP (ref 135–145)
SODIUM SERPL-SCNC: 142 MMOL/L — SIGNIFICANT CHANGE UP (ref 135–145)
SODIUM SERPL-SCNC: 142 MMOL/L — SIGNIFICANT CHANGE UP (ref 135–145)
SP GR SPEC: 1.02 — SIGNIFICANT CHANGE UP (ref 1–1.03)
SP GR SPEC: 1.02 — SIGNIFICANT CHANGE UP (ref 1–1.03)
UROBILINOGEN FLD QL: 0.2 MG/DL — SIGNIFICANT CHANGE UP (ref 0.2–1)
UROBILINOGEN FLD QL: 0.2 MG/DL — SIGNIFICANT CHANGE UP (ref 0.2–1)
WBC # BLD: 10.41 K/UL — SIGNIFICANT CHANGE UP (ref 3.8–10.5)
WBC # BLD: 10.41 K/UL — SIGNIFICANT CHANGE UP (ref 3.8–10.5)
WBC # BLD: 8.55 K/UL — SIGNIFICANT CHANGE UP (ref 3.8–10.5)
WBC # BLD: 8.55 K/UL — SIGNIFICANT CHANGE UP (ref 3.8–10.5)
WBC # FLD AUTO: 10.41 K/UL — SIGNIFICANT CHANGE UP (ref 3.8–10.5)
WBC # FLD AUTO: 10.41 K/UL — SIGNIFICANT CHANGE UP (ref 3.8–10.5)
WBC # FLD AUTO: 8.55 K/UL — SIGNIFICANT CHANGE UP (ref 3.8–10.5)
WBC # FLD AUTO: 8.55 K/UL — SIGNIFICANT CHANGE UP (ref 3.8–10.5)

## 2023-12-14 PROCEDURE — 36415 COLL VENOUS BLD VENIPUNCTURE: CPT

## 2023-12-14 PROCEDURE — 36000 PLACE NEEDLE IN VEIN: CPT

## 2023-12-14 PROCEDURE — 99222 1ST HOSP IP/OBS MODERATE 55: CPT | Mod: GC

## 2023-12-14 PROCEDURE — 76937 US GUIDE VASCULAR ACCESS: CPT | Mod: 26

## 2023-12-14 RX ORDER — SODIUM,POTASSIUM PHOSPHATES 278-250MG
2 POWDER IN PACKET (EA) ORAL ONCE
Refills: 0 | Status: COMPLETED | OUTPATIENT
Start: 2023-12-14 | End: 2023-12-14

## 2023-12-14 RX ORDER — SODIUM BICARBONATE 1 MEQ/ML
50 SYRINGE (ML) INTRAVENOUS ONCE
Refills: 0 | Status: COMPLETED | OUTPATIENT
Start: 2023-12-14 | End: 2023-12-14

## 2023-12-14 RX ORDER — SODIUM BICARBONATE 1 MEQ/ML
1.5 SYRINGE (ML) INTRAVENOUS
Qty: 150 | Refills: 0 | Status: DISCONTINUED | OUTPATIENT
Start: 2023-12-14 | End: 2023-12-14

## 2023-12-14 RX ORDER — POTASSIUM CHLORIDE 20 MEQ
10 PACKET (EA) ORAL
Refills: 0 | Status: COMPLETED | OUTPATIENT
Start: 2023-12-14 | End: 2023-12-14

## 2023-12-14 RX ORDER — ONDANSETRON 8 MG/1
4 TABLET, FILM COATED ORAL ONCE
Refills: 0 | Status: COMPLETED | OUTPATIENT
Start: 2023-12-14 | End: 2023-12-14

## 2023-12-14 RX ORDER — WATER FOR INHALATION
1000 VIAL, NEBULIZER (ML) INHALATION
Refills: 0 | Status: DISCONTINUED | OUTPATIENT
Start: 2023-12-14 | End: 2023-12-14

## 2023-12-14 RX ORDER — SODIUM CHLORIDE 9 MG/ML
1000 INJECTION, SOLUTION INTRAVENOUS
Refills: 0 | Status: DISCONTINUED | OUTPATIENT
Start: 2023-12-14 | End: 2023-12-14

## 2023-12-14 RX ORDER — INSULIN HUMAN 100 [IU]/ML
6 INJECTION, SOLUTION SUBCUTANEOUS
Qty: 100 | Refills: 0 | Status: DISCONTINUED | OUTPATIENT
Start: 2023-12-14 | End: 2023-12-15

## 2023-12-14 RX ORDER — ENOXAPARIN SODIUM 100 MG/ML
40 INJECTION SUBCUTANEOUS EVERY 24 HOURS
Refills: 0 | Status: DISCONTINUED | OUTPATIENT
Start: 2023-12-14 | End: 2023-12-16

## 2023-12-14 RX ORDER — INSULIN HUMAN 100 [IU]/ML
3 INJECTION, SOLUTION SUBCUTANEOUS
Qty: 50 | Refills: 0 | Status: DISCONTINUED | OUTPATIENT
Start: 2023-12-14 | End: 2023-12-14

## 2023-12-14 RX ORDER — SODIUM CHLORIDE 9 MG/ML
1000 INJECTION, SOLUTION INTRAVENOUS
Refills: 0 | Status: DISCONTINUED | OUTPATIENT
Start: 2023-12-14 | End: 2023-12-15

## 2023-12-14 RX ORDER — POTASSIUM PHOSPHATE, MONOBASIC POTASSIUM PHOSPHATE, DIBASIC 236; 224 MG/ML; MG/ML
30 INJECTION, SOLUTION INTRAVENOUS ONCE
Refills: 0 | Status: COMPLETED | OUTPATIENT
Start: 2023-12-14 | End: 2023-12-14

## 2023-12-14 RX ORDER — INSULIN HUMAN 100 [IU]/ML
5 INJECTION, SOLUTION SUBCUTANEOUS
Qty: 100 | Refills: 0 | Status: DISCONTINUED | OUTPATIENT
Start: 2023-12-14 | End: 2023-12-14

## 2023-12-14 RX ORDER — POTASSIUM CHLORIDE 20 MEQ
40 PACKET (EA) ORAL EVERY 4 HOURS
Refills: 0 | Status: COMPLETED | OUTPATIENT
Start: 2023-12-14 | End: 2023-12-14

## 2023-12-14 RX ORDER — MAGNESIUM SULFATE 500 MG/ML
1 VIAL (ML) INJECTION ONCE
Refills: 0 | Status: COMPLETED | OUTPATIENT
Start: 2023-12-14 | End: 2023-12-14

## 2023-12-14 RX ORDER — CHLORHEXIDINE GLUCONATE 213 G/1000ML
1 SOLUTION TOPICAL
Refills: 0 | Status: DISCONTINUED | OUTPATIENT
Start: 2023-12-14 | End: 2023-12-17

## 2023-12-14 RX ORDER — DEXTROSE MONOHYDRATE, SODIUM CHLORIDE, AND POTASSIUM CHLORIDE 50; .745; 4.5 G/1000ML; G/1000ML; G/1000ML
1000 INJECTION, SOLUTION INTRAVENOUS
Refills: 0 | Status: DISCONTINUED | OUTPATIENT
Start: 2023-12-14 | End: 2023-12-14

## 2023-12-14 RX ADMIN — Medication 50 MILLIEQUIVALENT(S): at 01:09

## 2023-12-14 RX ADMIN — Medication 100 MILLIEQUIVALENT(S): at 05:04

## 2023-12-14 RX ADMIN — Medication 100 MILLIEQUIVALENT(S): at 05:46

## 2023-12-14 RX ADMIN — SODIUM CHLORIDE 1000 MILLILITER(S): 9 INJECTION INTRAMUSCULAR; INTRAVENOUS; SUBCUTANEOUS at 00:55

## 2023-12-14 RX ADMIN — SODIUM CHLORIDE 100 MILLILITER(S): 9 INJECTION, SOLUTION INTRAVENOUS at 19:09

## 2023-12-14 RX ADMIN — Medication 40 MILLIEQUIVALENT(S): at 17:56

## 2023-12-14 RX ADMIN — POTASSIUM PHOSPHATE, MONOBASIC POTASSIUM PHOSPHATE, DIBASIC 83.33 MILLIMOLE(S): 236; 224 INJECTION, SOLUTION INTRAVENOUS at 22:05

## 2023-12-14 RX ADMIN — Medication 500 MILLILITER(S): at 06:29

## 2023-12-14 RX ADMIN — Medication 100 MILLIEQUIVALENT(S): at 06:29

## 2023-12-14 RX ADMIN — Medication 100 MILLIEQUIVALENT(S): at 11:31

## 2023-12-14 RX ADMIN — Medication 2 PACKET(S): at 21:45

## 2023-12-14 RX ADMIN — Medication 100 MILLIEQUIVALENT(S): at 10:40

## 2023-12-14 RX ADMIN — INSULIN HUMAN 5 UNIT(S)/HR: 100 INJECTION, SOLUTION SUBCUTANEOUS at 01:09

## 2023-12-14 RX ADMIN — Medication 100 MILLIEQUIVALENT(S): at 07:38

## 2023-12-14 RX ADMIN — ONDANSETRON 4 MILLIGRAM(S): 8 TABLET, FILM COATED ORAL at 18:21

## 2023-12-14 RX ADMIN — POTASSIUM PHOSPHATE, MONOBASIC POTASSIUM PHOSPHATE, DIBASIC 83.33 MILLIMOLE(S): 236; 224 INJECTION, SOLUTION INTRAVENOUS at 06:01

## 2023-12-14 RX ADMIN — Medication 100 MILLIEQUIVALENT(S): at 00:34

## 2023-12-14 RX ADMIN — Medication 2 PACKET(S): at 17:57

## 2023-12-14 RX ADMIN — Medication 100 MILLIEQUIVALENT(S): at 12:42

## 2023-12-14 RX ADMIN — Medication 40 MILLIEQUIVALENT(S): at 13:11

## 2023-12-14 RX ADMIN — Medication 100 MILLIEQUIVALENT(S): at 03:44

## 2023-12-14 RX ADMIN — Medication 50 MILLIEQUIVALENT(S): at 17:56

## 2023-12-14 RX ADMIN — Medication 40 MILLIEQUIVALENT(S): at 22:03

## 2023-12-14 RX ADMIN — Medication 100 GRAM(S): at 07:55

## 2023-12-14 RX ADMIN — Medication 567 MEQ/KG/HR: at 00:54

## 2023-12-14 RX ADMIN — Medication 40 MILLIEQUIVALENT(S): at 10:14

## 2023-12-14 RX ADMIN — ENOXAPARIN SODIUM 40 MILLIGRAM(S): 100 INJECTION SUBCUTANEOUS at 07:50

## 2023-12-14 RX ADMIN — DEXTROSE MONOHYDRATE, SODIUM CHLORIDE, AND POTASSIUM CHLORIDE 250 MILLILITER(S): 50; .745; 4.5 INJECTION, SOLUTION INTRAVENOUS at 13:11

## 2023-12-14 RX ADMIN — SODIUM CHLORIDE 2000 MILLILITER(S): 9 INJECTION INTRAMUSCULAR; INTRAVENOUS; SUBCUTANEOUS at 00:55

## 2023-12-14 RX ADMIN — INSULIN HUMAN 10 UNIT(S): 100 INJECTION, SOLUTION SUBCUTANEOUS at 00:40

## 2023-12-14 RX ADMIN — Medication 2 PACKET(S): at 06:01

## 2023-12-14 NOTE — ED ADULT NURSE REASSESSMENT NOTE - NS ED NURSE REASSESS COMMENT FT1
patient accepted to ICU admission. 1x attempt made from transfer center for report. awaiting call back for report to be provided

## 2023-12-14 NOTE — PROCEDURE NOTE - NSPOSTCAREGUIDE_GEN_A_CORE
Care for catheter as per unit/ICU protocols
Verbal/written post procedure instructions were given to patient/caregiver
Strong peripheral pulses

## 2023-12-14 NOTE — H&P ADULT - ATTENDING COMMENTS
Francoise Green  [id number]  [facility]  ->MICU  This is a 22 y/o female with IDDM, depression, anxiety who presented with N/V. She recently had sore throat, non productive cough and vomited non bilious material.  She meet criteria for DKA and was accepted from [facility] to MICU.  She is awake can subtract in serial 7s, a/ox4.   -DM1  with DKA  -RSV (+)  -depression  >IVF  >d/c bicarb gtt with the D5  >IVF with K  >check and replete Mag, K and phos  Please see the resident's note for the rest of the details. Francoise Green  [id number]  [facility]  ->MICU  This is a 24 y/o female with IDDM, depression, anxiety who presented with N/V. She recently had sore throat, non productive cough and vomited non bilious material.  She meet criteria for DKA and was accepted from [facility] to MICU.  She is awake can subtract in serial 7s, a/ox4.   -DM1  with DKA  -RSV (+)  -depression  >IVF  >d/c bicarb gtt with the D5  >IVF with K  >check and replete Mag, K and phos  Please see the resident's note for the rest of the details.

## 2023-12-14 NOTE — H&P ADULT - NSHPPHYSICALEXAM_GEN_ALL_CORE
Writer spoke to Annalisa HUERTA and Tere will see Dr Farah tomorrow morning.   VITALS:   T(C): 36.8 (12-14-23 @ 02:31), Max: 36.8 (12-14-23 @ 02:31)  HR: 94 (12-14-23 @ 02:31) (94 - 116)  BP: 131/78 (12-14-23 @ 02:31) (131/78 - 143/88)  RR: 16 (12-14-23 @ 02:31) (16 - 22)  SpO2: 100% (12-14-23 @ 02:31) (99% - 100%)    GENERAL: NAD, lying in bed comfortably  HEAD:  Atraumatic, normocephalic  EYES: EOMI, PERRLA, conjunctiva and sclera clear  ENT: Moist mucous membranes  NECK: Supple, no JVD  HEART: Regular rate and rhythm, no murmurs, rubs, or gallops  LUNGS: Unlabored respirations.  Clear to auscultation bilaterally, no crackles, wheezing, or rhonchi  ABDOMEN: Soft, nontender, nondistended, +BS  EXTREMITIES: 2+ peripheral pulses bilaterally. No clubbing, cyanosis, or edema  NERVOUS SYSTEM:  A&Ox3, no focal deficits   SKIN: No rashes or lesions VITALS:   T(C): 36.8 (12-14-23 @ 02:31), Max: 36.8 (12-14-23 @ 02:31)  HR: 94 (12-14-23 @ 02:31) (94 - 116)  BP: 131/78 (12-14-23 @ 02:31) (131/78 - 143/88)  RR: 16 (12-14-23 @ 02:31) (16 - 22)  SpO2: 100% (12-14-23 @ 02:31) (99% - 100%)    GENERAL: lethargic, resting in bed  HEAD:  Atraumatic, normocephalic  EYES: EOMI, PERRLA, conjunctiva and sclera clear  ENT: Moist mucous membranes  NECK: Supple, no JVD  HEART: Regular rate and rhythm, no murmurs, rubs, or gallops  LUNGS: Unlabored respirations.  Clear to auscultation bilaterally, no crackles, wheezing, or rhonchi  ABDOMEN: Soft, nontender, nondistended, +BS  EXTREMITIES: 2+ peripheral pulses bilaterally. Cool to touch. No clubbing, cyanosis, or edema  NERVOUS SYSTEM: Slow to respond, sleeping during interview   SKIN: No rashes or lesions

## 2023-12-14 NOTE — H&P ADULT - ASSESSMENT
22 y/o female  with history of diabetes, anxiety, depression,       22 yo F with PMHx of type I DM, anxiety, and depression presented for generalized weakness found to have +RSV and DKA and transferred to [facility] MICU for further management.     NEURO  Mentating at baseline       CARDIAC  JONI       PULM  Saturating well on RA      GI  NPO      RENAL   #Hypokalemia   #Anion gap metabolic acidosis   Likely 2/2 DKA   - Treat as stated below  - Replete electrolytes       ENDO  #DKA  #Type 1 DM  Pt has hx of Type I DM. On admission, Glucose (XX), B-Hydroxy (XX), Ag (XX). Possibly 2/2 (medication noncompliance vs infx, vs MI, vs toxins, vs trauma).  - 1L 0.9% NS (1L/h for first couple hours)  - KCl (XX) mEq/L IV (if K<3.3: 40mEq/L and hold Insulin; K 3.3-5.3: 20mEq/L; K>5.3: delay)(maintain K 4-5)  - Regular Insulin 0.14U/kg IV ggt (DO NOT GIVE if K<3.3)  - f/u CBC w/ diff, BMP, ABG, B-Hydroxy, (consider lipase)  - f/u EKG, CXR, UA, Blood cx  - q1h serum glucose checks  - q2h BMP/VBG until stable       ID  #RSV  Endorsing URI symptoms, found to be RSV+.   - Supportive measures       F:   E:  N:   DVT ppx: lovenox   FULL CODE  Dispo: MICU   24 yo F with PMHx of type I DM, anxiety, and depression presented for generalized weakness found to have +RSV and DKA and transferred to [facility] MICU for further management.     NEURO  Mentating at baseline       CARDIAC  JONI       PULM  Saturating well on RA      GI  NPO      RENAL   #Hypokalemia   #Anion gap metabolic acidosis   Likely 2/2 DKA   - Treat as stated below  - Replete electrolytes       ENDO  #DKA  #Type 1 DM  Pt has hx of Type I DM. On admission, Glucose (XX), B-Hydroxy (XX), Ag (XX). Possibly 2/2 (medication noncompliance vs infx, vs MI, vs toxins, vs trauma).  - 1L 0.9% NS (1L/h for first couple hours)  - KCl (XX) mEq/L IV (if K<3.3: 40mEq/L and hold Insulin; K 3.3-5.3: 20mEq/L; K>5.3: delay)(maintain K 4-5)  - Regular Insulin 0.14U/kg IV ggt (DO NOT GIVE if K<3.3)  - f/u CBC w/ diff, BMP, ABG, B-Hydroxy, (consider lipase)  - f/u EKG, CXR, UA, Blood cx  - q1h serum glucose checks  - q2h BMP/VBG until stable       ID  #RSV  Endorsing URI symptoms, found to be RSV+.   - Supportive measures       F:   E:  N:   DVT ppx: lovenox   FULL CODE  Dispo: MICU   24 y/o female with history of IDDM, depression, anxiety, presented with nausea and vomiting found to have +RSV and DKA and transferred to [facility] MICU for further management.     NEURO  #Anxiety  #Depression   Home meds: lexapro   - restart as tolerated    #AMS  Patient is lethargic on exam likely 2/2 to elevated blood glucose and dehydration.   - Continue to treat DKA as stated below     CARDIAC  JONI       PULM  Saturating well on RA      GI  NPO      RENAL   #Hypokalemia   #Anion gap metabolic acidosis   Likely 2/2 DKA   - Treat as stated below  - Replete electrolytes       ENDO  #DKA  #Type 1 DM  Pt has hx of Type I DM, diagnosed January 2022 at [facility]. On admission, Glucose >400, Ag 27, pH 6.93. Likely 2/2 RSV infection.  States she takes Triceba at home, but unable to state dose at this time d/t mental status.   S/p 50meq NaHCO3, insulin drip, 3L NS bolus, d5 with NaHCO3 in the ED  - Replete potassium if K<3.3 and hold Insulin; maintain K 4-5  - C/w insulin gtt; titrate per protocol    - f/u BHB, A1c  - q1h serum glucose checks  - q4h BMP until stable       ID  #RSV  Endorsing URI symptoms, found to be RSV+.   - Supportive measures       F: S/p 3L NS bolus and D5 NaHCO3  E: replete K<4, Mg<2  N: NPO  DVT ppx: lovenox   FULL CODE  Dispo: MICU   22 y/o female with history of IDDM, depression, anxiety, presented with nausea and vomiting found to have +RSV and DKA and transferred to [facility] MICU for further management.     NEURO  #Anxiety  #Depression   Home meds: lexapro   - restart as tolerated    #AMS  Patient is lethargic on exam likely 2/2 to elevated blood glucose and dehydration.   - Continue to treat DKA as stated below     CARDIAC  JONI       PULM  Saturating well on RA      GI  NPO      RENAL   #Hypokalemia   #Anion gap metabolic acidosis   Likely 2/2 DKA   - Treat as stated below  - Replete electrolytes       ENDO  #DKA  #Type 1 DM  Pt has hx of Type I DM, diagnosed January 2022 at [facility]. On admission, Glucose >400, Ag 27, pH 6.93. Likely 2/2 RSV infection.  States she takes Triceba at home, but unable to state dose at this time d/t mental status.   S/p 50meq NaHCO3, insulin drip, 3L NS bolus, d5 with NaHCO3 in the ED  - Replete potassium if K<3.3 and hold Insulin; maintain K 4-5  - C/w insulin gtt; titrate per protocol    - f/u BHB, A1c  - q1h serum glucose checks  - q4h BMP until stable       ID  #RSV  Endorsing URI symptoms, found to be RSV+.   - Supportive measures       F: S/p 3L NS bolus and D5 NaHCO3  E: replete K<4, Mg<2  N: NPO  DVT ppx: lovenox   FULL CODE  Dispo: MICU   24 y/o female with history of IDDM, depression, anxiety, presented with nausea and vomiting found to have +RSV and DKA and transferred to [facility] MICU for further management.     NEURO  #Anxiety  #Depression   F/u home dose of Lexapro in AM.   - restart as tolerated    #AMS  Patient is lethargic on exam likely 2/2 to elevated blood glucose and dehydration.   - Continue to treat DKA as stated below     CARDIAC  JONI       PULM  Saturating well on RA      GI  NPO      RENAL   #Hypokalemia   #Anion gap metabolic acidosis   Likely 2/2 DKA   - Treat as stated below  - Replete electrolytes       ENDO  #DKA  #Type 1 DM  Pt has hx of Type I DM, diagnosed January 2022 at [facility]. On admission, Glucose >400, Ag 27, pH 6.93. Likely 2/2 RSV infection.  States she takes Triceba at home, but unable to state dose at this time d/t mental status.   S/p 50meq NaHCO3, insulin drip, 3L NS bolus, d5 with NaHCO3 in the ED  - Replete potassium; maintain K 4-5, hold insulin if needed  - C/w insulin gtt; titrate per protocol    - f/u BHB, A1c  - q1h serum glucose checks  - q4h BMP until stable       ID  #RSV  Endorsing URI symptoms, found to be RSV+.   - Supportive measures       F: S/p 3L NS bolus and D5 NaHCO3  E: replete K<4, Mg<2  N: NPO  DVT ppx: Lovenox   FULL CODE  Dispo: MICU   24 y/o female with history of IDDM, depression, anxiety, presented with nausea and vomiting found to have +RSV and DKA and transferred to [facility] MICU for further management.     NEURO  #Anxiety  #Depression   F/u home dose of Lexapro in AM.   - restart as tolerated    #AMS  Patient is lethargic on exam likely 2/2 to elevated blood glucose and dehydration.   - Continue to treat DKA as stated below     CARDIAC  JONI       PULM  Saturating well on RA      GI  NPO      RENAL   #Hypokalemia   #Anion gap metabolic acidosis   Likely 2/2 DKA   - Treat as stated below  - Replete electrolytes       ENDO  #DKA  #Type 1 DM  Pt has hx of Type I DM, diagnosed January 2022 at [facility]. On admission, Glucose >400, Ag 27, pH 6.93. Likely 2/2 RSV infection.  States she takes Triceba at home, but unable to state dose at this time d/t mental status.   S/p 50meq NaHCO3, insulin drip, 3L NS bolus, d5 with NaHCO3 in the ED  - Replete potassium; maintain K 4-5, hold insulin if needed  - C/w insulin gtt; titrate per protocol    - f/u BHB, A1c  - q1h serum glucose checks  - q4h BMP until stable       ID  #RSV  Endorsing URI symptoms, found to be RSV+.   - Supportive measures       F: S/p 3L NS bolus and D5 NaHCO3  E: replete K<4, Mg<2  N: NPO  DVT ppx: Lovenox   FULL CODE  Dispo: MICU   22 y/o female with history of IDDM, depression, anxiety, presented with nausea and vomiting found to have +RSV and DKA and transferred to [facility] MICU for further management.     NEURO  #Anxiety  #Depression   F/u home dose of Lexapro in AM.   - restart as tolerated    #AMS  Patient is lethargic on exam likely 2/2 to elevated blood glucose and dehydration.   - Continue to treat DKA as stated below     CARDIAC  JONI       PULM  Saturating well on RA      GI  NPO      RENAL   #Hypokalemia   #Anion gap metabolic acidosis   Likely 2/2 DKA   - Treat as stated below  - Replete electrolytes       ENDO  #DKA  #Type 1 DM  Pt has hx of Type I DM, diagnosed January 2022 at [facility]. On admission, Glucose >400, Ag 27, pH 6.93. Likely 2/2 RSV infection.  States she takes Triceba at home, but unable to state dose at this time d/t mental status.   S/p 50meq NaHCO3, insulin drip, 3L NS bolus, d5 with NaHCO3 in the ED  - Replete potassium; maintain K 4-5, hold insulin if K<3.3  - C/w insulin gtt; titrate per protocol    - f/u BHB, A1c  - q1h serum glucose checks  - q4h BMP until stable       ID  #RSV  Endorsing URI symptoms, found to be RSV+.   - Supportive measures       F: S/p 3L NS bolus and D5 NaHCO3  E: replete K<4, Mg<2  N: NPO  DVT ppx: Lovenox   FULL CODE  Dispo: MICU   24 y/o female with history of IDDM, depression, anxiety, presented with nausea and vomiting found to have +RSV and DKA and transferred to [facility] MICU for further management.     NEURO  #Anxiety  #Depression   F/u home dose of Lexapro in AM.   - restart as tolerated    #AMS  Patient is lethargic on exam likely 2/2 to elevated blood glucose and dehydration.   - Continue to treat DKA as stated below     CARDIAC  JONI       PULM  Saturating well on RA      GI  NPO      RENAL   #Hypokalemia   #Anion gap metabolic acidosis   Likely 2/2 DKA   - Treat as stated below  - Replete electrolytes       ENDO  #DKA  #Type 1 DM  Pt has hx of Type I DM, diagnosed January 2022 at [facility]. On admission, Glucose >400, Ag 27, pH 6.93. Likely 2/2 RSV infection.  States she takes Triceba at home, but unable to state dose at this time d/t mental status.   S/p 50meq NaHCO3, insulin drip, 3L NS bolus, d5 with NaHCO3 in the ED  - Replete potassium; maintain K 4-5, hold insulin if K<3.3  - C/w insulin gtt; titrate per protocol    - f/u BHB, A1c  - q1h serum glucose checks  - q4h BMP until stable       ID  #RSV  Endorsing URI symptoms, found to be RSV+.   - Supportive measures       F: S/p 3L NS bolus and D5 NaHCO3  E: replete K<4, Mg<2  N: NPO  DVT ppx: Lovenox   FULL CODE  Dispo: MICU   24 y/o female with history of IDDM, depression, anxiety, presented with nausea and vomiting found to have +RSV and DKA and transferred to [facility] MICU for further management.     NEURO  #Anxiety  #Depression   F/u home dose of Lexapro in AM.   - restart as tolerated    #AMS  Patient is lethargic on exam likely 2/2 to elevated blood glucose and dehydration.   - Continue to treat DKA as stated below     CARDIAC  JONI       PULM  Saturating well on RA      GI  NPO      RENAL   #Hypokalemia   #Anion gap metabolic acidosis   Likely 2/2 DKA   - Treat as stated below  - Replete electrolytes       ENDO  #DKA  #Type 1 DM  Pt has hx of Type I DM, diagnosed January 2022 at [facility]. On admission, Glucose >400, Ag 27, pH 6.93. Likely 2/2 RSV infection.  States she takes Triseba at home, but unable to state dose at this time d/t mental status. Denies nausea, vomiting, polyuria and polydipsia.   S/p 50meq NaHCO3, insulin drip, 3L NS bolus, d5 with NaHCO3 in the ED  - Replete potassium; maintain K 4-5, hold insulin if K<3.3  - C/w insulin gtt; titrate per protocol    - f/u BHB, A1c  - q1h serum glucose checks  - q4h BMP until stable       ID  #RSV  Endorsing URI symptoms, found to be RSV+.   - Supportive measures       F: S/p 3L NS bolus and D5 NaHCO3  E: replete K<4, Mg<2  N: NPO  DVT ppx: Lovenox   FULL CODE  Dispo: MICU   24 y/o female with history of IDDM, depression, anxiety, presented with nausea and vomiting found to have +RSV and DKA and transferred to [facility] MICU for further management.     NEURO  #Anxiety  #Depression   F/u home dose of Lexapro in AM.   - restart as tolerated    #AMS  Patient is lethargic on exam likely 2/2 to elevated blood glucose and dehydration.   - Continue to treat DKA as stated below     CARDIAC  JONI       PULM  Saturating well on RA      GI  NPO      RENAL   #Hypokalemia   #Anion gap metabolic acidosis   Likely 2/2 DKA   - Treat as stated below  - Replete electrolytes       ENDO  #DKA  #Type 1 DM  Pt has hx of Type I DM, diagnosed January 2022 at [facility]. On admission, Glucose >400, Ag 27, pH 6.93. Likely 2/2 RSV infection.  States she takes Triseba at home, but unable to state dose at this time d/t mental status. Denies nausea, vomiting, polyuria and polydipsia.   S/p 50meq NaHCO3, insulin drip, 3L NS bolus, d5 with NaHCO3 in the ED  - Replete potassium; maintain K 4-5, hold insulin if K<3.3  - C/w insulin gtt; titrate per protocol    - f/u BHB, A1c  - q1h serum glucose checks  - q4h BMP until stable       ID  #RSV  Endorsing URI symptoms, found to be RSV+.   - Supportive measures       F: S/p 3L NS bolus and D5 NaHCO3  E: replete K<4, Mg<2  N: NPO  DVT ppx: Lovenox   FULL CODE  Dispo: MICU

## 2023-12-14 NOTE — CONSULT NOTE ADULT - SUBJECTIVE AND OBJECTIVE BOX
HISTORY OF PRESENT ILLNESS  JANAE MARTIN is a 23y Female with a past medical history of     Endocrinology has been consulted for Diabetes Management.    DIABETES HISTORY  - Age at diagnosis:   - Current Therapy:  - History of other regimens:   - History of hypoglycemia:   - History of DKA/HHS:   - Diabetic Complications:   - Home glucose readings:  - Diet:          > Breakfast:         > Lunch:        > Dinner:        > Snacks:  - Physical activity:    - Diabetes managed outpatient by:    FAMILY HISTORY  - Diabetes:  - Thyroid:  - Autoimmune:  - Other:    SOCIAL HISTORY  - Work:  - Alcohol:  - Smoking:  - Recreational Drugs:    ALLERGIES  No Known Allergies      CURRENT MEDICATIONS  chlorhexidine 2% Cloths 1 Application(s) Topical <User Schedule>  enoxaparin Injectable 40 milliGRAM(s) SubCutaneous every 24 hours  insulin regular Infusion 5 Unit(s)/Hr IV Continuous <Continuous>  sodium chloride 0.9% with potassium chloride 40 mEq/L 1000 milliLiter(s) IV Continuous <Continuous>      REVIEW OF SYSTEMS  Constitutional:  Negative fever, chills or loss of appetite.  Eyes:  Negative blurry vision or double vision.  Cardiovascular:  Negative for chest pain or palpitations.  Respiratory:  Negative for cough, wheezing, or shortness of breath.   Gastrointestinal:  Negative for nausea, vomiting, diarrhea, constipation, or abdominal pain.  Genitourinary:  Negative frequency, urgency or dysuria.  Neurologic:  No headache, confusion, dizziness, lightheadedness.    PHYSICAL EXAM  Vital Signs Last 24 Hrs  T(C): 36.4 (14 Dec 2023 10:34), Max: 36.8 (14 Dec 2023 02:31)  T(F): 97.5 (14 Dec 2023 10:34), Max: 98.2 (14 Dec 2023 02:31)  HR: 95 (14 Dec 2023 12:00) (91 - 116)  BP: 96/56 (14 Dec 2023 12:00) (85/52 - 143/88)  BP(mean): 70 (14 Dec 2023 12:00) (64 - 96)  RR: 16 (14 Dec 2023 12:00) (14 - 28)  SpO2: 100% (14 Dec 2023 12:00) (98% - 100%)    Parameters below as of 14 Dec 2023 12:00  Patient On (Oxygen Delivery Method): room air    Constitutional: Awake, alert, in no acute distress.   HEENT: Normocephalic, atraumatic, GABO, no proptosis or lid retraction.   Neck: supple, no acanthosis, no thyromegaly or palpable thyroid nodules.  Respiratory: Lungs clear to ausculation bilaterally.   Cardiovascular: regular rhythm, normal S1 and S2, no audible murmurs.   GI: soft, non-tender, non-distended, bowel sounds present, no masses appreciated.  Extremities: No lower extremity edema, peripheral pulses present.   Skin: no rashes.   Psychiatric: AAO x 3. Normal affect/mood.     LABS  CBC - WBC/HGB/HTC/PLT: 8.55/13.0/36.7/217 (12-14-23)  BMP: Na/K/Cl/Bicarb/BUN/Cr/Gluc: 137/2.7/103/10/14/0.82/238 (12-14-23)  Anion Gap: 24 (12-14-23)  eGFR: 103 (12-14-23)  Calcium: 7.8 (12-14-23)  Phosphorus: 1.2 (12-14-23)  Magnesium: 2.1 (12-14-23)  LFT - Alb/Tprot/Tbili/Dbili/AlkPhos/ALT/AST: 4.0/--/0.2/--/161/8/11 (12-14-23)  PT/aPTT/INR: 8.9/27.1/0.77 (12-14-23)    CBC - WBC/HGB/HTC/PLT: 8.55/13.0/36.7/217 (12-14-23)BMP: Na/K/Cl/Bicarb/BUN/Cr/Gluc: 137/2.7/103/10/14/0.82/238 (12-14-23)  Anion Gap: 24 (12-14-23)  eGFR: 103 (12-14-23)  Calcium: 7.8 (12-14-23)  Phosphorus: 1.2 (12-14-23)  Magnesium: 2.1 (12-14-23)    CAPILLARY BLOOD GLUCOSE & INSULIN RECEIVED  470 mg/dL (12-13 @ 23:24)  441 mg/dL (12-13 @ 23:26)  410 mg/dL (12-14 @ 01:21)  456 mg/dL (12-14 @ 02:34)  369 mg/dL (12-14 @ 03:43)  253 mg/dL (12-14 @ 04:50)  273 mg/dL (12-14 @ 05:34)  218 mg/dL (12-14 @ 06:41)  234 mg/dL (12-14 @ 07:41)  249 mg/dL (12-14 @ 08:43)  218 mg/dL (12-14 @ 09:28)  188 mg/dL (12-14 @ 10:16)  177 mg/dL (12-14 @ 11:08)  209 mg/dL (12-14 @ 12:17)  228 mg/dL (12-14 @ 13:16)        12-13-23 @ 07:01  -  12-14-23 @ 07:00  --------------------------------------------------------  IN: 1086 mL / OUT: 350 mL / NET: 736 mL    12-14-23 @ 07:01  -  12-14-23 @ 13:27  --------------------------------------------------------  IN: 1742.8 mL / OUT: 950 mL / NET: 792.8 mL      ASSESSMENT / RECOMMENDATIONS    A1C: 14.1 %  BUN: 14  Creatinine: 0.82  GFR: 103  Weight: 56.7  BMI:   EF:     # Type 2 diabetes mellitus with hyperglycemia  - Please keep lantus   units at bedtime.   - Keep lispro   units before each meal.  - Continue lispro moderate dose sliding scale before meals and at bedtime.  - Patient's fingerstick glucose goal is 100-180 mg/dL.    - Discharge recommendations to be discussed.   - Patient can follow up at discharge with [facility] by calling [phone] to make an appointment.      Case discussed with Dr. Lunsford. Primary team updated.       Maria De Jesus Rosas  Endocrinology Fellow    Service Pager: [phone]  HISTORY OF PRESENT ILLNESS  JANAE MARTIN is a 23y Female with a past medical history of     Endocrinology has been consulted for Diabetes Management.    DIABETES HISTORY  - Age at diagnosis:   - Current Therapy:  - History of other regimens:   - History of hypoglycemia:   - History of DKA/HHS:   - Diabetic Complications:   - Home glucose readings:  - Diet:          > Breakfast:         > Lunch:        > Dinner:        > Snacks:  - Physical activity:    - Diabetes managed outpatient by:    FAMILY HISTORY  - Diabetes:  - Thyroid:  - Autoimmune:  - Other:    SOCIAL HISTORY  - Work:  - Alcohol:  - Smoking:  - Recreational Drugs:    ALLERGIES  No Known Allergies      CURRENT MEDICATIONS  chlorhexidine 2% Cloths 1 Application(s) Topical <User Schedule>  enoxaparin Injectable 40 milliGRAM(s) SubCutaneous every 24 hours  insulin regular Infusion 5 Unit(s)/Hr IV Continuous <Continuous>  sodium chloride 0.9% with potassium chloride 40 mEq/L 1000 milliLiter(s) IV Continuous <Continuous>      REVIEW OF SYSTEMS  Constitutional:  Negative fever, chills or loss of appetite.  Eyes:  Negative blurry vision or double vision.  Cardiovascular:  Negative for chest pain or palpitations.  Respiratory:  Negative for cough, wheezing, or shortness of breath.   Gastrointestinal:  Negative for nausea, vomiting, diarrhea, constipation, or abdominal pain.  Genitourinary:  Negative frequency, urgency or dysuria.  Neurologic:  No headache, confusion, dizziness, lightheadedness.    PHYSICAL EXAM  Vital Signs Last 24 Hrs  T(C): 36.4 (14 Dec 2023 10:34), Max: 36.8 (14 Dec 2023 02:31)  T(F): 97.5 (14 Dec 2023 10:34), Max: 98.2 (14 Dec 2023 02:31)  HR: 95 (14 Dec 2023 12:00) (91 - 116)  BP: 96/56 (14 Dec 2023 12:00) (85/52 - 143/88)  BP(mean): 70 (14 Dec 2023 12:00) (64 - 96)  RR: 16 (14 Dec 2023 12:00) (14 - 28)  SpO2: 100% (14 Dec 2023 12:00) (98% - 100%)    Parameters below as of 14 Dec 2023 12:00  Patient On (Oxygen Delivery Method): room air    Constitutional: Awake, alert, in no acute distress.   HEENT: Normocephalic, atraumatic, GABO, no proptosis or lid retraction.   Neck: supple, no acanthosis, no thyromegaly or palpable thyroid nodules.  Respiratory: Lungs clear to ausculation bilaterally.   Cardiovascular: regular rhythm, normal S1 and S2, no audible murmurs.   GI: soft, non-tender, non-distended, bowel sounds present, no masses appreciated.  Extremities: No lower extremity edema, peripheral pulses present.   Skin: no rashes.   Psychiatric: AAO x 3. Normal affect/mood.     LABS  CBC - WBC/HGB/HTC/PLT: 8.55/13.0/36.7/217 (12-14-23)  BMP: Na/K/Cl/Bicarb/BUN/Cr/Gluc: 137/2.7/103/10/14/0.82/238 (12-14-23)  Anion Gap: 24 (12-14-23)  eGFR: 103 (12-14-23)  Calcium: 7.8 (12-14-23)  Phosphorus: 1.2 (12-14-23)  Magnesium: 2.1 (12-14-23)  LFT - Alb/Tprot/Tbili/Dbili/AlkPhos/ALT/AST: 4.0/--/0.2/--/161/8/11 (12-14-23)  PT/aPTT/INR: 8.9/27.1/0.77 (12-14-23)    CBC - WBC/HGB/HTC/PLT: 8.55/13.0/36.7/217 (12-14-23)BMP: Na/K/Cl/Bicarb/BUN/Cr/Gluc: 137/2.7/103/10/14/0.82/238 (12-14-23)  Anion Gap: 24 (12-14-23)  eGFR: 103 (12-14-23)  Calcium: 7.8 (12-14-23)  Phosphorus: 1.2 (12-14-23)  Magnesium: 2.1 (12-14-23)    CAPILLARY BLOOD GLUCOSE & INSULIN RECEIVED  470 mg/dL (12-13 @ 23:24)  441 mg/dL (12-13 @ 23:26)  410 mg/dL (12-14 @ 01:21)  456 mg/dL (12-14 @ 02:34)  369 mg/dL (12-14 @ 03:43)  253 mg/dL (12-14 @ 04:50)  273 mg/dL (12-14 @ 05:34)  218 mg/dL (12-14 @ 06:41)  234 mg/dL (12-14 @ 07:41)  249 mg/dL (12-14 @ 08:43)  218 mg/dL (12-14 @ 09:28)  188 mg/dL (12-14 @ 10:16)  177 mg/dL (12-14 @ 11:08)  209 mg/dL (12-14 @ 12:17)  228 mg/dL (12-14 @ 13:16)        12-13-23 @ 07:01  -  12-14-23 @ 07:00  --------------------------------------------------------  IN: 1086 mL / OUT: 350 mL / NET: 736 mL    12-14-23 @ 07:01  -  12-14-23 @ 13:27  --------------------------------------------------------  IN: 1742.8 mL / OUT: 950 mL / NET: 792.8 mL      ASSESSMENT / RECOMMENDATIONS    A1C: 14.1 %  BUN: 14  Creatinine: 0.82  GFR: 103  Weight: 56.7  BMI:   EF:     # Type 2 diabetes mellitus with hyperglycemia  - Please keep lantus   units at bedtime.   - Keep lispro   units before each meal.  - Continue lispro moderate dose sliding scale before meals and at bedtime.  - Patient's fingerstick glucose goal is 100-180 mg/dL.    - Discharge recommendations to be discussed.   - Patient can follow up at discharge with [facility] by calling [phone] to make an appointment.      Case discussed with Dr. Lunsford. Primary team updated.       Maria De Jesus Rosas  Endocrinology Fellow    Service Pager: [phone]  HISTORY OF PRESENT ILLNESS  24 y/o female with history of IDDM, depression, anxiety, presented with nausea and vomiting. She went to a party Saturday night with friends, after which she felt like she was getting sick.  Reports a headache and nonproductive cough which started 4 days ago with intermittent improvement with over the counter cold medication.  Wednesday morning patient became nauseas with several episodes of non-bilious vomiting, weakness and fatigue, Per patient's mother at bedside patient appeared to be off her baseline mental status and appeared short of breat. She had an episode of DKA two years ago in January 2022 when she was diagnosed with DM. At that time she had similar symptoms. Denies polyuria, polydipsia, polyphagia, fever, chills, N/V/D/C, abdominal pain, CP, palpitations, focal weakness, HA, dizziness, tremors, change in urinary/bowel functions, rash, AH/VH, and malaise.    Endocrinology has been consulted for DKA Management.    DIABETES HISTORY  - Age at diagnosis: 21  - Current Therapy: Tresiba 3u qHS and fiasp insulin sliding scale premeal  - History of other regimens: Has been on lantus/lispro as well as levemer and aspart in past  - History of hypoglycemia: Yes, patient gets hypoglycemic symptoms when sugars drop to the 40s-50s, particularly at night  - History of DKA/HHS: Yes, 2 years ago at time of diagnosis   - Diabetic Complications: None  - Home glucose readings: Patient reports 100-150, however has been noncompliant over the last several months and has not been checking her glucose  - Diabetes managed outpatient by: OP endocrinologist     FAMILY HISTORY  - Diabetes: None  - Thyroid: None  - Autoimmune: None  - Other:    SOCIAL HISTORY  - Work: Works for [email or web address]   - Alcohol: Social drinker  - Smoking: None  - Recreational Drugs: None     ALLERGIES  No Known Allergies    CURRENT MEDICATIONS  chlorhexidine 2% Cloths 1 Application(s) Topical <User Schedule>  enoxaparin Injectable 40 milliGRAM(s) SubCutaneous every 24 hours  insulin regular Infusion 5 Unit(s)/Hr IV Continuous <Continuous>  sodium chloride 0.9% with potassium chloride 40 mEq/L 1000 milliLiter(s) IV Continuous <Continuous>    REVIEW OF SYSTEMS  Constitutional:  Negative fever, chills or loss of appetite.  Eyes:  Negative blurry vision or double vision.  Cardiovascular:  Negative for chest pain or palpitations.  Respiratory:  Negative for cough, wheezing, or shortness of breath.   Gastrointestinal:  + for nausea, vomiting, diarrhea, constipation, or abdominal pain.  Genitourinary:  Negative frequency, urgency or dysuria.  Neurologic:  + for headache, confusion, dizziness, lightheadedness.    PHYSICAL EXAM  Vital Signs Last 24 Hrs  T(C): 36.4 (14 Dec 2023 10:34), Max: 36.8 (14 Dec 2023 02:31)  T(F): 97.5 (14 Dec 2023 10:34), Max: 98.2 (14 Dec 2023 02:31)  HR: 95 (14 Dec 2023 12:00) (91 - 116)  BP: 96/56 (14 Dec 2023 12:00) (85/52 - 143/88)  BP(mean): 70 (14 Dec 2023 12:00) (64 - 96)  RR: 16 (14 Dec 2023 12:00) (14 - 28)  SpO2: 100% (14 Dec 2023 12:00) (98% - 100%)    Parameters below as of 14 Dec 2023 12:00  Patient On (Oxygen Delivery Method): room air    Constitutional: Awake, alert, in no acute distress.   HEENT: Normocephalic, atraumatic, GABO, no proptosis or lid retraction.   Neck: supple, no acanthosis, no thyromegaly or palpable thyroid nodules.  Respiratory: Lungs clear to ausculation bilaterally.   Cardiovascular: regular rhythm, normal S1 and S2, no audible murmurs.   GI: soft, non-tender, non-distended, bowel sounds present, no masses appreciated.  Extremities: No lower extremity edema, peripheral pulses present.   Skin: no rashes.   Psychiatric: AAO x 3. Normal affect/mood.     LABS  CBC - WBC/HGB/HTC/PLT: 8.55/13.0/36.7/217 (12-14-23)  BMP: Na/K/Cl/Bicarb/BUN/Cr/Gluc: 137/2.7/103/10/14/0.82/238 (12-14-23)  Anion Gap: 24 (12-14-23)  eGFR: 103 (12-14-23)  Calcium: 7.8 (12-14-23)  Phosphorus: 1.2 (12-14-23)  Magnesium: 2.1 (12-14-23)  LFT - Alb/Tprot/Tbili/Dbili/AlkPhos/ALT/AST: 4.0/--/0.2/--/161/8/11 (12-14-23)  PT/aPTT/INR: 8.9/27.1/0.77 (12-14-23)    CBC - WBC/HGB/HTC/PLT: 8.55/13.0/36.7/217 (12-14-23)BMP: Na/K/Cl/Bicarb/BUN/Cr/Gluc: 137/2.7/103/10/14/0.82/238 (12-14-23)  Anion Gap: 24 (12-14-23)  eGFR: 103 (12-14-23)  Calcium: 7.8 (12-14-23)  Phosphorus: 1.2 (12-14-23)  Magnesium: 2.1 (12-14-23)    CAPILLARY BLOOD GLUCOSE & INSULIN RECEIVED  470 mg/dL (12-13 @ 23:24)  441 mg/dL (12-13 @ 23:26) 10u humalin   410 mg/dL (12-14 @ 01:21) 10u/hr insulin drip  456 mg/dL (12-14 @ 02:34) ""  369 mg/dL (12-14 @ 03:43) ""  253 mg/dL (12-14 @ 04:50)  5u/hr insulin drip  273 mg/dL (12-14 @ 05:34)  insulin drip stopped   218 mg/dL (12-14 @ 06:41) ''  234 mg/dL (12-14 @ 07:41) 5u/hr insulin drip  249 mg/dL (12-14 @ 08:43) ''  218 mg/dL (12-14 @ 09:28) ''  188 mg/dL (12-14 @ 10:16) insulin drip stopped  177 mg/dL (12-14 @ 11:08) ''  209 mg/dL (12-14 @ 12:17) ''  228 mg/dL (12-14 @ 13:16) ''     12-13-23 @ 07:01  -  12-14-23 @ 07:00  --------------------------------------------------------  IN: 1086 mL / OUT: 350 mL / NET: 736 mL    12-14-23 @ 07:01  -  12-14-23 @ 13:27  --------------------------------------------------------  IN: 1742.8 mL / OUT: 950 mL / NET: 792.8 mL      ASSESSMENT / RECOMMENDATIONS  24 y/o female with history of IDDM, depression, anxiety, presented with 4 days of HA and URI sx and one day of nausea and vomiting, found to be in DKA and RSV + for which insulin drip and DKA protocol were initiated and endocrine was consulted for. Patient presented to [facility] last evening with a pH of 6.93, AG >27, bicarbonate of 4 and BHB >5. Also evidence of dehyrdation on her blood work. Patient was initially given 4.5L of NS, 10u of Humalin, and placed on a bicarb drip, she subsequently was placed on an insulin drip and NS infusion and transferred to the ICU. Patients AG remains elevated and her insulin drip has since been paused twice due to potassium levels <3. Patients pH has normalized since initial presentation. On history, patient reports that her diabetes was under good control until about October when she returned from [facility], her last A1c checked outpatient was <6. Patient reports significant diabetes fatigue and states it has had negative impacts on her mental health. She reports that she has not been using her glucose monitor or checking her FSGs for the past several months. Additionally she has not been taking her Tresiba at night since coming down with this acute illness on Monday. As a result her current A1c is 14%, which is not all driven by her current state of DKA. Of note, patient has been trialed on insulin pump in the past, however reports issues with pump failure.     A1C: 14.1 %  BUN: 14  Creatinine: 0.82  GFR: 103  Weight: 56.7    # DKA  # Poorly controlled Type 1 DM  - Patient seems to be very sensitive to low doses of insulin, making long term control of her diabetes very difficulty with subcutaneous injections. She ideally should be on a pump long term  - Patient still actively in ketoacidosis, would adjust insulin drip to 3U an hour   - Please change mIVF to D5NS @ 150 cc/hr to maintain glucose of 200-250   - Agree with adding supplemental potassium to her mIVF as she continues to shift potassium intracellularly at a high rate  - Would adjust insulin drip rate according to the hyperglycemia protocol   - Please continue insulin drip until AG is closed x2  - Discharge recommendations to be discussed.   - Patient can follow up at discharge with [facility] by calling [phone] to make an appointment.      Case discussed with Dr. Lunsford. Primary team updated.       Augustus Lebron  PGY3  Service Pager: [phone]  HISTORY OF PRESENT ILLNESS  22 y/o female with history of IDDM, depression, anxiety, presented with nausea and vomiting. She went to a party Saturday night with friends, after which she felt like she was getting sick.  Reports a headache and nonproductive cough which started 4 days ago with intermittent improvement with over the counter cold medication.  Wednesday morning patient became nauseas with several episodes of non-bilious vomiting, weakness and fatigue, Per patient's mother at bedside patient appeared to be off her baseline mental status and appeared short of breat. She had an episode of DKA two years ago in January 2022 when she was diagnosed with DM. At that time she had similar symptoms. Denies polyuria, polydipsia, polyphagia, fever, chills, N/V/D/C, abdominal pain, CP, palpitations, focal weakness, HA, dizziness, tremors, change in urinary/bowel functions, rash, AH/VH, and malaise.    Endocrinology has been consulted for DKA Management.    DIABETES HISTORY  - Age at diagnosis: 21  - Current Therapy: Tresiba 3u qHS and fiasp insulin sliding scale premeal  - History of other regimens: Has been on lantus/lispro as well as levemer and aspart in past  - History of hypoglycemia: Yes, patient gets hypoglycemic symptoms when sugars drop to the 40s-50s, particularly at night  - History of DKA/HHS: Yes, 2 years ago at time of diagnosis   - Diabetic Complications: None  - Home glucose readings: Patient reports 100-150, however has been noncompliant over the last several months and has not been checking her glucose  - Diabetes managed outpatient by: OP endocrinologist     FAMILY HISTORY  - Diabetes: None  - Thyroid: None  - Autoimmune: None  - Other:    SOCIAL HISTORY  - Work: Works for [employer]   - Alcohol: Social drinker  - Smoking: None  - Recreational Drugs: None     ALLERGIES  No Known Allergies    CURRENT MEDICATIONS  chlorhexidine 2% Cloths 1 Application(s) Topical <User Schedule>  enoxaparin Injectable 40 milliGRAM(s) SubCutaneous every 24 hours  insulin regular Infusion 5 Unit(s)/Hr IV Continuous <Continuous>  sodium chloride 0.9% with potassium chloride 40 mEq/L 1000 milliLiter(s) IV Continuous <Continuous>    REVIEW OF SYSTEMS  Constitutional:  Negative fever, chills or loss of appetite.  Eyes:  Negative blurry vision or double vision.  Cardiovascular:  Negative for chest pain or palpitations.  Respiratory:  Negative for cough, wheezing, or shortness of breath.   Gastrointestinal:  + for nausea, vomiting, diarrhea, constipation, or abdominal pain.  Genitourinary:  Negative frequency, urgency or dysuria.  Neurologic:  + for headache, confusion, dizziness, lightheadedness.    PHYSICAL EXAM  Vital Signs Last 24 Hrs  T(C): 36.4 (14 Dec 2023 10:34), Max: 36.8 (14 Dec 2023 02:31)  T(F): 97.5 (14 Dec 2023 10:34), Max: 98.2 (14 Dec 2023 02:31)  HR: 95 (14 Dec 2023 12:00) (91 - 116)  BP: 96/56 (14 Dec 2023 12:00) (85/52 - 143/88)  BP(mean): 70 (14 Dec 2023 12:00) (64 - 96)  RR: 16 (14 Dec 2023 12:00) (14 - 28)  SpO2: 100% (14 Dec 2023 12:00) (98% - 100%)    Parameters below as of 14 Dec 2023 12:00  Patient On (Oxygen Delivery Method): room air    Constitutional: Awake, alert, in no acute distress.   HEENT: Normocephalic, atraumatic, GABO, no proptosis or lid retraction.   Neck: supple, no acanthosis, no thyromegaly or palpable thyroid nodules.  Respiratory: Lungs clear to ausculation bilaterally.   Cardiovascular: regular rhythm, normal S1 and S2, no audible murmurs.   GI: soft, non-tender, non-distended, bowel sounds present, no masses appreciated.  Extremities: No lower extremity edema, peripheral pulses present.   Skin: no rashes.   Psychiatric: AAO x 3. Normal affect/mood.     LABS  CBC - WBC/HGB/HTC/PLT: 8.55/13.0/36.7/217 (12-14-23)  BMP: Na/K/Cl/Bicarb/BUN/Cr/Gluc: 137/2.7/103/10/14/0.82/238 (12-14-23)  Anion Gap: 24 (12-14-23)  eGFR: 103 (12-14-23)  Calcium: 7.8 (12-14-23)  Phosphorus: 1.2 (12-14-23)  Magnesium: 2.1 (12-14-23)  LFT - Alb/Tprot/Tbili/Dbili/AlkPhos/ALT/AST: 4.0/--/0.2/--/161/8/11 (12-14-23)  PT/aPTT/INR: 8.9/27.1/0.77 (12-14-23)    CBC - WBC/HGB/HTC/PLT: 8.55/13.0/36.7/217 (12-14-23)BMP: Na/K/Cl/Bicarb/BUN/Cr/Gluc: 137/2.7/103/10/14/0.82/238 (12-14-23)  Anion Gap: 24 (12-14-23)  eGFR: 103 (12-14-23)  Calcium: 7.8 (12-14-23)  Phosphorus: 1.2 (12-14-23)  Magnesium: 2.1 (12-14-23)    CAPILLARY BLOOD GLUCOSE & INSULIN RECEIVED  470 mg/dL (12-13 @ 23:24)  441 mg/dL (12-13 @ 23:26) 10u humalin   410 mg/dL (12-14 @ 01:21) 10u/hr insulin drip  456 mg/dL (12-14 @ 02:34) ""  369 mg/dL (12-14 @ 03:43) ""  253 mg/dL (12-14 @ 04:50)  5u/hr insulin drip  273 mg/dL (12-14 @ 05:34)  insulin drip stopped   218 mg/dL (12-14 @ 06:41) ''  234 mg/dL (12-14 @ 07:41) 5u/hr insulin drip  249 mg/dL (12-14 @ 08:43) ''  218 mg/dL (12-14 @ 09:28) ''  188 mg/dL (12-14 @ 10:16) insulin drip stopped  177 mg/dL (12-14 @ 11:08) ''  209 mg/dL (12-14 @ 12:17) ''  228 mg/dL (12-14 @ 13:16) ''     12-13-23 @ 07:01  -  12-14-23 @ 07:00  --------------------------------------------------------  IN: 1086 mL / OUT: 350 mL / NET: 736 mL    12-14-23 @ 07:01  -  12-14-23 @ 13:27  --------------------------------------------------------  IN: 1742.8 mL / OUT: 950 mL / NET: 792.8 mL      ASSESSMENT / RECOMMENDATIONS  22 y/o female with history of IDDM, depression, anxiety, presented with 4 days of HA and URI sx and one day of nausea and vomiting, found to be in DKA and RSV + for which insulin drip and DKA protocol were initiated and endocrine was consulted for. Patient presented to [facility] last evening with a pH of 6.93, AG >27, bicarbonate of 4 and BHB >5. Also evidence of dehyrdation on her blood work. Patient was initially given 4.5L of NS, 10u of Humalin, and placed on a bicarb drip, she subsequently was placed on an insulin drip and NS infusion and transferred to the ICU. Patients AG remains elevated and her insulin drip has since been paused twice due to potassium levels <3. Patients pH has normalized since initial presentation. On history, patient reports that her diabetes was under good control until about October when she returned from [facility], her last A1c checked outpatient was <6. Patient reports significant diabetes fatigue and states it has had negative impacts on her mental health. She reports that she has not been using her glucose monitor or checking her FSGs for the past several months. Additionally she has not been taking her Tresiba at night since coming down with this acute illness on Monday. As a result her current A1c is 14%, which is not all driven by her current state of DKA. Of note, patient has been trialed on insulin pump in the past, however reports issues with pump failure.     A1C: 14.1 %  BUN: 14  Creatinine: 0.82  GFR: 103  Weight: 56.7    # DKA  # Poorly controlled Type 1 DM  - Patient seems to be very sensitive to low doses of insulin, making long term control of her diabetes very difficulty with subcutaneous injections. She ideally should be on a pump long term  - Patient still actively in ketoacidosis, would adjust insulin drip to 3U an hour   - Please change mIVF to D5NS @ 150 cc/hr to maintain glucose of 200-250   - Agree with adding supplemental potassium to her mIVF as she continues to shift potassium intracellularly at a high rate  - Would adjust insulin drip rate according to the hyperglycemia protocol   - Please continue insulin drip until AG is closed x2  - Discharge recommendations to be discussed.   - Patient can follow up at discharge with [facility] by calling [phone] to make an appointment.      Case discussed with Dr. Lunsford. Primary team updated.       Augustus Lebron  PGY3  Service Pager: [phone]

## 2023-12-14 NOTE — H&P ADULT - NSHPLABSRESULTS_GEN_ALL_CORE
15.1   10.41 )-----------( 293      ( 13 Dec 2023 23:36 )             44.7     12-    134  |  101  |  18  ----------------------------<  478<HH>  3.7   |  <6<LL>  |  1.33<H>    Ca    8.6      13 Dec 2023 23:36  Phos  4.5     12-  Mg     2.5     -    TPro  8.0  /  Alb  3.9  /  TBili  0.4  /  DBili  x   /  AST  18  /  ALT  17  /  AlkPhos  197<H>  12-    Urinalysis Basic - ( 13 Dec 2023 23:36 )    Color: Yellow / Appearance: Clear / S.018 / pH: x  Gluc: 478 mg/dL / Ketone: >=160 mg/dL  / Bili: Negative / Urobili: 0.2 mg/dL   Blood: x / Protein: 30 mg/dL / Nitrite: Negative   Leuk Esterase: Negative / RBC: 4 /HPF / WBC 1 /HPF   Sq Epi: x / Non Sq Epi: x / Bacteria: present /HPF    RADIOLOGY & ADDITIONAL TESTS:  Reviewed

## 2023-12-14 NOTE — PROGRESS NOTE ADULT - SUBJECTIVE AND OBJECTIVE BOX
Hospital Course :   24 y/o female with history of IDDM, depression, anxiety, presented with nausea and vomiting. She went to a party Saturday night with friends, after which she felt like she was getting sick.  Reports a headache and nonproductive cough which started 4 days ago with intermittent improvement with over the counter cold medication.  Wednesday morning patient became nauseas with several episodes of non-bilious vomiting, weakness and fatigue, Per patient's mother at bedside patient appeared to be off her baseline mental status and appeared short of breat. She had an episode of DKA two years ago in 2022 when she was diagnosed with DM. At that time she had similar symptoms. Denies polyuria, polydipsia, polyphagia, fever, chills, N/V/D/C, abdominal pain, CP, palpitations, focal weakness, HA, dizziness, tremors, change in urinary/bowel functions, rash, AH/VH, and malaise.    ED Course:  Vitals: Temp 97, , /99, RR 19, SaO2 100% room air  Labs: WBC 10.41, Hgb 15.1, Na 134, K 3.7, Cl 101, CO2<6, AG>27, BUN 18, Scr 1.33, GLucose 478, Alk Phos 197, UA ketones >=160 glucose  >=1000, RSV (+)  CXR: no infiltrates or consolidations  Interventions: 50meq NaHCO3, insulin drip, 3L NS bolus, d5 with NaHCO3      Review of Systems:  Other Review of Systems: All other review of systems negative, except as noted in HPI      Allergies and Intolerances:        Allergies:  	No Known Allergies:     Home Medications:   * Patient Currently Takes Medications as of 10-Apollo-2022 16:19 documented in Structured Notes  · 	Basaglar KwikPen 100 units/mL subcutaneous solution: 12 unit(s) subcutaneous once a day   · 	NovoLOG FlexPen 100 units/mL injectable solution: 8 unit(s) subcutaneous 3 times a day (with meals)   · 	Lantus Solostar Pen 100 units/mL subcutaneous solution: 12 unit(s) subcutaneous once a day (at bedtime)   · 	Insulin Pen Needles, 4mm: 1 application subcutaneously 4 times a day. ** Use with insulin pen **   · 	test strips (per patient's insurance): 1 application subcutaneously 4 times a day. ** Compatible with patient's glucometer **  · 	glucometer (per patient's insurance): Test blood sugars four times a day. Dispense #1 glucometer.  · 	alcohol swabs : Apply topically to affected area 4 times a day   · 	lancets: 1 application subcutaneously 4 times a day     Patient History:    Past Medical, Past Surgical, and Family History:  PAST MEDICAL HISTORY:  DKA (diabetic ketoacidosis)     DM (diabetes mellitus), type 1.     PAST SURGICAL HISTORY:  No significant past surgical history.     Social History:  · Substance use	Unable to obtain  · Social History (marital status, living situation, occupation, and sexual history)	works   accompanied by her mother  unable to assess about substance use d/t mental status     Tobacco Screening:  · Core Measure Site	No    Risk Assessment:    Present on Admission:  Deep Venous Thrombosis	no  Pulmonary Embolus	no     HIV Screening:  · In accordance with [place] State law, we offer every patient who comes to our ED an HIV test. Would you like to be tested today?	Opt out    Physical Exam:   Physical Exam: VITALS:   T(C): 36.8 (23 @ 02:31), Max: 36.8 (23 @ 02:31)  HR: 94 (23 @ 02:31) (94 - 116)  BP: 131/78 (23 @ 02:31) (131/78 - 143/88)  RR: 16 (23 @ 02:31) (16 - 22)  SpO2: 100% (23 @ 02:31) (99% - 100%)    GENERAL: lethargic, resting in bed  HEAD:  Atraumatic, normocephalic  EYES: EOMI, PERRLA, conjunctiva and sclera clear  ENT: Moist mucous membranes  NECK: Supple, no JVD  HEART: Regular rate and rhythm, no murmurs, rubs, or gallops  LUNGS: Unlabored respirations.  Clear to auscultation bilaterally, no crackles, wheezing, or rhonchi  ABDOMEN: Soft, nontender, nondistended, +BS  EXTREMITIES: 2+ peripheral pulses bilaterally. Cool to touch. No clubbing, cyanosis, or edema  NERVOUS SYSTEM: Slow to respond, sleeping during interview   SKIN: No rashes or lesions       Labs and Results:  Labs, Radiology, Cardiology, and Other Results: 15.1   10.41 )-----------( 293      ( 13 Dec 2023 23:36 )             44.7         134  |  101  |  18  ----------------------------<  478<HH>  3.7   |  <6<LL>  |  1.33<H>    Ca    8.6      13 Dec 2023 23:36  Phos  4.5     -  Mg     2.5         TPro  8.0  /  Alb  3.9  /  TBili  0.4  /  DBili  x   /  AST  18  /  ALT  17  /  AlkPhos  197<H>      Urinalysis Basic - ( 13 Dec 2023 23:36 )    Color: Yellow / Appearance: Clear / S.018 / pH: x  Gluc: 478 mg/dL / Ketone: >=160 mg/dL  / Bili: Negative / Urobili: 0.2 mg/dL   Blood: x / Protein: 30 mg/dL / Nitrite: Negative   Leuk Esterase: Negative / RBC: 4 /HPF / WBC 1 /HPF   Sq Epi: x / Non Sq Epi: x / Bacteria: present /HPF    RADIOLOGY & ADDITIONAL TESTS:  Reviewed   Hospital Course :   22 y/o female with history of IDDM, depression, anxiety, presented with nausea and vomiting. She went to a party Saturday night with friends, after which she felt like she was getting sick.  Reports a headache and nonproductive cough which started 4 days ago with intermittent improvement with over the counter cold medication.  Wednesday morning patient became nauseas with several episodes of non-bilious vomiting, weakness and fatigue, Per patient's mother at bedside patient appeared to be off her baseline mental status and appeared short of breat. She had an episode of DKA two years ago in 2022 when she was diagnosed with DM. At that time she had similar symptoms. Denies polyuria, polydipsia, polyphagia, fever, chills, N/V/D/C, abdominal pain, CP, palpitations, focal weakness, HA, dizziness, tremors, change in urinary/bowel functions, rash, AH/VH, and malaise.    ED Course:  Vitals: Temp 97, , /99, RR 19, SaO2 100% room air  Labs: WBC 10.41, Hgb 15.1, Na 134, K 3.7, Cl 101, CO2<6, AG>27, BUN 18, Scr 1.33, GLucose 478, Alk Phos 197, UA ketones >=160 glucose  >=1000, RSV (+)  CXR: no infiltrates or consolidations  Interventions: 50meq NaHCO3, insulin drip, 3L NS bolus, d5 with NaHCO3      Review of Systems:  Other Review of Systems: All other review of systems negative, except as noted in HPI      Allergies and Intolerances:        Allergies:  	No Known Allergies:     Home Medications:   * Patient Currently Takes Medications as of 10-Apollo-2022 16:19 documented in Structured Notes  · 	Basaglar KwikPen 100 units/mL subcutaneous solution: 12 unit(s) subcutaneous once a day   · 	NovoLOG FlexPen 100 units/mL injectable solution: 8 unit(s) subcutaneous 3 times a day (with meals)   · 	Lantus Solostar Pen 100 units/mL subcutaneous solution: 12 unit(s) subcutaneous once a day (at bedtime)   · 	Insulin Pen Needles, 4mm: 1 application subcutaneously 4 times a day. ** Use with insulin pen **   · 	test strips (per patient's insurance): 1 application subcutaneously 4 times a day. ** Compatible with patient's glucometer **  · 	glucometer (per patient's insurance): Test blood sugars four times a day. Dispense #1 glucometer.  · 	alcohol swabs : Apply topically to affected area 4 times a day   · 	lancets: 1 application subcutaneously 4 times a day     Patient History:    Past Medical, Past Surgical, and Family History:  PAST MEDICAL HISTORY:  DKA (diabetic ketoacidosis)     DM (diabetes mellitus), type 1.     PAST SURGICAL HISTORY:  No significant past surgical history.     Social History:  · Substance use	Unable to obtain  · Social History (marital status, living situation, occupation, and sexual history)	works   accompanied by her mother  unable to assess about substance use d/t mental status     Tobacco Screening:  · Core Measure Site	No    Risk Assessment:    Present on Admission:  Deep Venous Thrombosis	no  Pulmonary Embolus	no     HIV Screening:  · In accordance with [place] State law, we offer every patient who comes to our ED an HIV test. Would you like to be tested today?	Opt out    Physical Exam:   Physical Exam: VITALS:   T(C): 36.8 (23 @ 02:31), Max: 36.8 (23 @ 02:31)  HR: 94 (23 @ 02:31) (94 - 116)  BP: 131/78 (23 @ 02:31) (131/78 - 143/88)  RR: 16 (23 @ 02:31) (16 - 22)  SpO2: 100% (23 @ 02:31) (99% - 100%)    GENERAL: lethargic, resting in bed  HEAD:  Atraumatic, normocephalic  EYES: EOMI, PERRLA, conjunctiva and sclera clear  ENT: Moist mucous membranes  NECK: Supple, no JVD  HEART: Regular rate and rhythm, no murmurs, rubs, or gallops  LUNGS: Unlabored respirations.  Clear to auscultation bilaterally, no crackles, wheezing, or rhonchi  ABDOMEN: Soft, nontender, nondistended, +BS  EXTREMITIES: 2+ peripheral pulses bilaterally. Cool to touch. No clubbing, cyanosis, or edema  NERVOUS SYSTEM: Slow to respond, sleeping during interview   SKIN: No rashes or lesions       Labs and Results:  Labs, Radiology, Cardiology, and Other Results: 15.1   10.41 )-----------( 293      ( 13 Dec 2023 23:36 )             44.7         134  |  101  |  18  ----------------------------<  478<HH>  3.7   |  <6<LL>  |  1.33<H>    Ca    8.6      13 Dec 2023 23:36  Phos  4.5     -  Mg     2.5         TPro  8.0  /  Alb  3.9  /  TBili  0.4  /  DBili  x   /  AST  18  /  ALT  17  /  AlkPhos  197<H>      Urinalysis Basic - ( 13 Dec 2023 23:36 )    Color: Yellow / Appearance: Clear / S.018 / pH: x  Gluc: 478 mg/dL / Ketone: >=160 mg/dL  / Bili: Negative / Urobili: 0.2 mg/dL   Blood: x / Protein: 30 mg/dL / Nitrite: Negative   Leuk Esterase: Negative / RBC: 4 /HPF / WBC 1 /HPF   Sq Epi: x / Non Sq Epi: x / Bacteria: present /HPF    RADIOLOGY & ADDITIONAL TESTS:  Reviewed

## 2023-12-14 NOTE — PROGRESS NOTE ADULT - ASSESSMENT
24 y/o female with history of IDDM, depression, anxiety, presented with nausea and vomiting found to have +RSV and DKA and transferred to [facility] MICU for further management.     NEURO  #Anxiety  #Depression   F/u home dose of Lexapro in AM.   - restart as tolerated    #AMS  Patient is lethargic on exam likely 2/2 to elevated blood glucose and dehydration.   - Continue to treat DKA as stated below     CARDIAC  JONI       PULM  Saturating well on RA      GI  NPO      RENAL   #Hypokalemia   #Anion gap metabolic acidosis   Likely 2/2 DKA   - Treat as stated below  - Replete electrolytes       ENDO  #DKA  #Type 1 DM  Pt has hx of Type I DM, diagnosed January 2022 at [facility]. On admission, Glucose >400, Ag 27, pH 6.93. Likely 2/2 RSV infection.  States she takes Triseba at home, but unable to state dose at this time d/t mental status. Denies nausea, vomiting, polyuria and polydipsia.   S/p 50meq NaHCO3, insulin drip, 3L NS bolus, d5 with NaHCO3 in the ED  - Replete potassium; maintain K 4-5, hold insulin if K<3.3  - C/w insulin gtt; titrate per protocol    - f/u BHB, A1c  - q1h serum glucose checks  - q4h BMP until stable       ID  #RSV  Endorsing URI symptoms, found to be RSV+.   - Supportive measures       F: S/p 3L NS bolus and D5 NaHCO3  E: replete K<4, Mg<2  N: NPO  DVT ppx: Lovenox   FULL CODE  Dispo: MICU   22 y/o female with history of IDDM, depression, anxiety, presented with nausea and vomiting found to have +RSV and DKA and transferred to [facility] MICU for further management.     NEURO  #Anxiety  #Depression   F/u home dose of Lexapro in AM.   - restart as tolerated    #AMS  Patient is lethargic on exam likely 2/2 to elevated blood glucose and dehydration.   - Continue to treat DKA as stated below     CARDIAC  JONI       PULM  Saturating well on RA      GI  NPO      RENAL   #Hypokalemia   #Anion gap metabolic acidosis   Likely 2/2 DKA   - Treat as stated below  - Replete electrolytes       ENDO  #DKA  #Type 1 DM  Pt has hx of Type I DM, diagnosed January 2022 at [facility]. On admission, Glucose >400, Ag 27, pH 6.93. Likely 2/2 RSV infection.  States she takes Triseba at home, but unable to state dose at this time d/t mental status. Denies nausea, vomiting, polyuria and polydipsia.   S/p 50meq NaHCO3, insulin drip, 3L NS bolus, d5 with NaHCO3 in the ED  - Replete potassium; maintain K 4-5, hold insulin if K<3.3  - C/w insulin gtt; titrate per protocol    - f/u BHB, A1c  - q1h serum glucose checks  - q4h BMP until stable       ID  #RSV  Endorsing URI symptoms, found to be RSV+.   - Supportive measures       F: S/p 3L NS bolus and D5 NaHCO3  E: replete K<4, Mg<2  N: NPO  DVT ppx: Lovenox   FULL CODE  Dispo: MICU   22 y/o female with history of IDDM, depression, anxiety, presented with nausea and vomiting found to have +RSV and DKA and transferred to [facility] MICU for further management.     NEURO  #Anxiety  #Depression   F/u home dose of Lexapro in AM.   - restart as tolerated    #AMS  Patient is lethargic on exam likely 2/2 to elevated blood glucose and dehydration.   - Continue to treat DKA as stated below     CARDIAC  JONI       PULM  Saturating well on RA      GI  NPO      RENAL   #Hypokalemia   #Anion gap metabolic acidosis   Likely 2/2 DKA   - Treat as stated below  - Replete electrolytes       ENDO  #DKA  #Type 1 DM  Pt has hx of Type I DM, diagnosed January 2022 at [facility]. On admission, Glucose >400, Ag 27, pH 6.93. Likely 2/2 RSV infection.  States she takes Triseba at home, but unable to state dose at this time d/t mental status. Denies nausea, vomiting, polyuria and polydipsia.   S/p 50meq NaHCO3, insulin drip, 3L NS bolus, d5 with NaHCO3 in the ED  - Replete potassium; maintain K 4-5, hold insulin if K<3.3  - C/w insulin gtt; titrate per protocol    - f/u BHB, A1c  - q1h serum glucose checks  - q4h BMP until stable       ID  #RSV  Endorsing URI symptoms, found to be RSV+.   - Supportive measures       F: dextrose 5% NS  E: replete K<4, Mg<2  N: NPO  DVT ppx: Lovenox 40mg @24h  FULL CODE  Dispo: MICU   22 y/o female with history of IDDM, depression, anxiety, presented with nausea and vomiting found to have +RSV and DKA and transferred to [facility] MICU for further management.     NEURO  #Anxiety  #Depression   F/u home dose of Lexapro in AM.   - restart as tolerated    #AMS  Patient is lethargic on exam likely 2/2 to elevated blood glucose and dehydration.   - Continue to treat DKA as stated below     CARDIAC  JONI       PULM  Saturating well on RA      GI  NPO      RENAL   #Hypokalemia   #Anion gap metabolic acidosis   Likely 2/2 DKA   - Treat as stated below  - Replete electrolytes       ENDO  #DKA  #Type 1 DM  Pt has hx of Type I DM, diagnosed January 2022 at [facility]. On admission, Glucose >400, Ag 27, pH 6.93. Likely 2/2 RSV infection.  States she takes Triseba at home, but unable to state dose at this time d/t mental status. Denies nausea, vomiting, polyuria and polydipsia.   S/p 50meq NaHCO3, insulin drip, 3L NS bolus, d5 with NaHCO3 in the ED  - Replete potassium; maintain K 4-5, hold insulin if K<3.3  - C/w insulin gtt; titrate per protocol    - f/u BHB, A1c  - q1h serum glucose checks  - q4h BMP until stable       ID  #RSV  Endorsing URI symptoms, found to be RSV+.   - Supportive measures       F: dextrose 5% NS  E: replete K<4, Mg<2  N: NPO  DVT ppx: Lovenox 40mg @24h  FULL CODE  Dispo: MICU   22 y/o female with history of IDDM, depression, anxiety, presented with nausea and vomiting found to have +RSV and DKA and transferred to [facility] MICU for further management.     NEURO  #Anxiety  #Depression   F/u home dose of Lexapro in AM.   - restart as tolerated    #AMS  Patient is lethargic on exam likely 2/2 to elevated blood glucose and dehydration.   - Continue to treat DKA as stated below     CARDIAC  JONI       PULM  Saturating well on RA      GI  NPO      RENAL   #Hypokalemia   #Anion gap metabolic acidosis   Likely 2/2 DKA   - Treat as stated below  - Replete electrolytes       ENDO  #DKA  #Type 1 DM  Pt has hx of Type I DM, diagnosed January 2022 at [facility]. On admission, Glucose >400, Ag 27, pH 6.93. Likely 2/2 RSV infection.  States she takes Triseba at home, but unable to state dose at this time d/t mental status. Denies nausea, vomiting, polyuria and polydipsia.   S/p 50meq NaHCO3, insulin drip, 3L NS bolus, d5 with NaHCO3 in the ED  - Replete potassium; maintain K 4-5, hold insulin if K<3.3  - C/w insulin gtt; titrate per protocol    - f/u BHB, A1c  - q1h serum glucose checks  - q4h BMP until stable     Endo recs :   Poorly controlled Type 1 DM  - Patient seems to be very sensitive to low doses of insulin, making long term control of her diabetes very difficulty with subcutaneous injections. She ideally should be on a pump long term  - Patient still actively in ketoacidosis, would adjust insulin drip to 3U an hour   - Please change mIVF to D5NS @ 150 cc/hr to maintain glucose of 200-250   - Agree with adding supplemental potassium to her mIVF as she continues to shift potassium intracellularly at a high rate  - Would adjust insulin drip rate according to the hyperglycemia protocol   - Please continue insulin drip until AG is closed x2  - Discharge recommendations to be discussed.   - Patient can follow up at discharge with [facility] by calling [phone] to make an appointment.       ID  #RSV  Endorsing URI symptoms, found to be RSV+.   - Supportive measures       F: dextrose 5% NS  E: replete K<4, Mg<2  N: NPO  DVT ppx: Lovenox 40mg @24h  FULL CODE  Dispo: MICU   22 y/o female with history of IDDM, depression, anxiety, presented with nausea and vomiting found to have +RSV and DKA and transferred to [facility] MICU for further management.     NEURO  #Anxiety  #Depression   F/u home dose of Lexapro in AM.   - restart as tolerated    #AMS  Patient is lethargic on exam likely 2/2 to elevated blood glucose and dehydration.   - Continue to treat DKA as stated below     CARDIAC  JONI       PULM  Saturating well on RA      GI  NPO      RENAL   #Hypokalemia   #Anion gap metabolic acidosis   Likely 2/2 DKA   - Treat as stated below  - Replete electrolytes       ENDO  #DKA  #Type 1 DM  Pt has hx of Type I DM, diagnosed January 2022 at [facility]. On admission, Glucose >400, Ag 27, pH 6.93. Likely 2/2 RSV infection.  States she takes Triseba at home, but unable to state dose at this time d/t mental status. Denies nausea, vomiting, polyuria and polydipsia.   S/p 50meq NaHCO3, insulin drip, 3L NS bolus, d5 with NaHCO3 in the ED  - Replete potassium; maintain K 4-5, hold insulin if K<3.3  - C/w insulin gtt; titrate per protocol    - f/u BHB, A1c  - q1h serum glucose checks  - q4h BMP until stable     Endo recs :   Poorly controlled Type 1 DM  - Patient seems to be very sensitive to low doses of insulin, making long term control of her diabetes very difficulty with subcutaneous injections. She ideally should be on a pump long term  - Patient still actively in ketoacidosis, would adjust insulin drip to 3U an hour   - Please change mIVF to D5NS @ 150 cc/hr to maintain glucose of 200-250   - Agree with adding supplemental potassium to her mIVF as she continues to shift potassium intracellularly at a high rate  - Would adjust insulin drip rate according to the hyperglycemia protocol   - Please continue insulin drip until AG is closed x2  - Discharge recommendations to be discussed.   - Patient can follow up at discharge with [facility] by calling [phone] to make an appointment.       ID  #RSV  Endorsing URI symptoms, found to be RSV+.   - Supportive measures       F: dextrose 5% NS  E: replete K<4, Mg<2  N: NPO  DVT ppx: Lovenox 40mg @24h  FULL CODE  Dispo: MICU

## 2023-12-14 NOTE — PATIENT PROFILE ADULT - FALL HARM RISK - RISK INTERVENTIONS
Assistance OOB with selected safe patient handling equipment/Assistance with ambulation/Communicate Fall Risk and Risk Factors to all staff, patient, and family/Reinforce activity limits and safety measures with patient and family/Visual Cue: Yellow wristband/Bed in lowest position, wheels locked, appropriate side rails in place/Call bell, personal items and telephone in reach/Instruct patient to call for assistance before getting out of bed or chair/Non-slip footwear when patient is out of bed/[place] to call system/Physically safe environment - no spills, clutter or unnecessary equipment/Purposeful Proactive Rounding/Room/bathroom lighting operational, light cord in reach Assistance OOB with selected safe patient handling equipment/Assistance with ambulation/Communicate Fall Risk and Risk Factors to all staff, patient, and family/Reinforce activity limits and safety measures with patient and family/Visual Cue: Yellow wristband/Bed in lowest position, wheels locked, appropriate side rails in place/Call bell, personal items and telephone in reach/Instruct patient to call for assistance before getting out of bed or chair/Non-slip footwear when patient is out of bed/[place] to call system/Physically safe environment - no spills, clutter or unnecessary equipment/Purposeful Proactive Rounding/Room/bathroom lighting operational, light cord in reach

## 2023-12-14 NOTE — H&P ADULT - HISTORY OF PRESENT ILLNESS
24 yo F with PMHx of type I DM, on insulin ss, last given insulin 2d ago, DKA 2 yrs ago, BIBA accompanied by family for cough and generalized weakness. Pt reports having non productive cough, generalized weakness x 2d. Noted worsening sx today with increased WOB and family found her "not at baseline and off." Denies polyuria, polydipsia, polyphagia, fever, chills, N/V/D/C, abdominal pain, CP, palpitations, focal weakness, HA, dizziness, tremors, change in urinary/bowel functions, rash, AH/VH, and malaise.    ED Course:  Vitals: Temp 97, , /99, RR 19, SaO2 100% room air  Labs: WBC 10.41, Hgb 15.1, Na 134, K 3.7, Cl 101, CO2<6, AG>27, BUN 18, Scr 1.33, GLucose 478, Alk Phos 197, UA ketones >=160 glucose  >=1000, RSV (+)  CXR: no infiltrates or consolidations  Interventions: 50meq NaHCO3, insulin drip, 3L NS bolus, d5 with NaHCO3      22 y/o female with history of IDDM, depression, anxiety, presented with nausea and vomiting.  Patient reports she has headache and nonproductive cough which started 4 days ago with intermittent improvement with over the counter cold medication.  Wednesday morning patient she became nauseas with several episodes of non-bilious vomiting with weakness and fatigue, Per patien's mother at bedside patient appeared to be off her baseline mental status and appears to be short of breath.  She had an episode of DKA two years ago, Denies polyuria, polydipsia, polyphagia, fever, chills, N/V/D/C, abdominal pain, CP, palpitations, focal weakness, HA, dizziness, tremors, change in urinary/bowel functions, rash, AH/VH, and malaise.    ED Course:  Vitals: Temp 97, , /99, RR 19, SaO2 100% room air  Labs: WBC 10.41, Hgb 15.1, Na 134, K 3.7, Cl 101, CO2<6, AG>27, BUN 18, Scr 1.33, GLucose 478, Alk Phos 197, UA ketones >=160 glucose  >=1000, RSV (+)  CXR: no infiltrates or consolidations  Interventions: 50meq NaHCO3, insulin drip, 3L NS bolus, d5 with NaHCO3      24 y/o female with history of IDDM, depression, anxiety, presented with nausea and vomiting.  Patient reports she has headache and nonproductive cough which started 4 days ago with intermittent improvement with over the counter cold medication.  Wednesday morning patient she became nauseas with several episodes of non-bilious vomiting with weakness and fatigue, Per patien's mother at bedside patient appeared to be off her baseline mental status and appears to be short of breath.  She had an episode of DKA two years ago, Denies polyuria, polydipsia, polyphagia, fever, chills, N/V/D/C, abdominal pain, CP, palpitations, focal weakness, HA, dizziness, tremors, change in urinary/bowel functions, rash, AH/VH, and malaise.    ED Course:  Vitals: Temp 97, , /99, RR 19, SaO2 100% room air  Labs: WBC 10.41, Hgb 15.1, Na 134, K 3.7, Cl 101, CO2<6, AG>27, BUN 18, Scr 1.33, GLucose 478, Alk Phos 197, UA ketones >=160 glucose  >=1000, RSV (+)  CXR: no infiltrates or consolidations  Interventions: 50meq NaHCO3, insulin drip, 3L NS bolus, d5 with NaHCO3      22 y/o female with history of IDDM, depression, anxiety, presented with nausea and vomiting. She went to a party Saturday night with friends, after which she felt like she was getting sick.  Reports a headache and nonproductive cough which started 4 days ago with intermittent improvement with over the counter cold medication.  Wednesday morning patient became nauseas with several episodes of non-bilious vomiting, weakness and fatigue, Per patient's mother at bedside patient appeared to be off her baseline mental status and appeared short of breat. She had an episode of DKA two years ago in January 2022 when she was diagnosed with DM. At that time she had similar symptoms. Denies polyuria, polydipsia, polyphagia, fever, chills, N/V/D/C, abdominal pain, CP, palpitations, focal weakness, HA, dizziness, tremors, change in urinary/bowel functions, rash, AH/VH, and malaise.    ED Course:  Vitals: Temp 97, , /99, RR 19, SaO2 100% room air  Labs: WBC 10.41, Hgb 15.1, Na 134, K 3.7, Cl 101, CO2<6, AG>27, BUN 18, Scr 1.33, GLucose 478, Alk Phos 197, UA ketones >=160 glucose  >=1000, RSV (+)  CXR: no infiltrates or consolidations  Interventions: 50meq NaHCO3, insulin drip, 3L NS bolus, d5 with NaHCO3

## 2023-12-14 NOTE — CONSULT NOTE ADULT - ATTENDING COMMENTS
Pt seen on rounds this afternoon.  23-yo woman with type 1 DM, initially diagnosed in Jan of 2022 when she presented here in DKA, now admitted with another DKA episode.  Has been ill with respiratory symptoms for the past 3-4 days, and has tested positive for RSV.  She has not been eating well, and admitted to not having taken her basal insulin for at least the past 3 days.  On further questioning, she also admitted to having "not taken care of her diabetes" for at least the past 2 months, which included doing almost no fingerstick monitoring.  (She had been briefly on a CGM in the past, but stopped using it out of what she describes as frustration seeing fluctuating glucoses which "made no sense."  She was severely acidotic on presentation to [facility], and received one amp of NaHCO3 plus an infusion for 4 hours.  Therapy so far has been sub-optimal because the insulin drip has needed to be stopped for hypokalemia.  Her current insulin regimen is 3 units of Tresiba qhs, premeal Novolog of 1 unit/75 mg% above 150  It is unclear where she is being followed for her diabetes.  Told us initially that she has continued to be followed at [facility] in [place] (where she grew up),  Her mother (who still lives in [place]) was unaware of this.    Her mother also told us that the pt underwent an experimental stem cell transplant procedure "to prevent the sugar from going too low."  At this point the gap is still open and her BHB is 5.7  --With the K up to 3.7 meq/l, to restart the drip--but decrease the insulin drip to 3.0 U/hr (along with D5NS or D5/LR at 150/hr)--decrease both the insulin and dextrose to limit the potential for hypokalemia.  --She is also mildly hypocalcemic (as she was last admission) and may have partial hypoparathyroidism.  Recheck a PTH and vit D levels. Pt seen on rounds this afternoon.  23-yo woman with type 1 DM, initially diagnosed in Jan of 2022 when she presented here in DKA, now admitted with another DKA episode.  Has been ill with respiratory symptoms for the past 3-4 days, and has tested positive for RSV.  She has not been eating well, and admitted to not having taken her basal insulin for at least the past 3 days.  On further questioning, she also admitted to having "not taken care of her diabetes" for at least the past 2 months, which included doing almost no fingerstick monitoring.  (She had been briefly on a CGM in the past, but stopped using it out of what she describes as frustration seeing fluctuating glucoses which "made no sense."  She was severely acidotic on presentation to [facility], and received one amp of NaHCO3 plus an infusion for 4 hours.  Therapy so far has been sub-optimal because the insulin drip has needed to be stopped for hypokalemia.  Her current insulin regimen is 3 units of Tresiba qhs, premeal Novolog of 1 unit/75 mg% above 150  It is unclear where she is being followed for her diabetes.  Told us initially that she has continued to be followed at [facility] in [place] (where she grew up),  Her mother (who still lives in [place]) was unaware of this.    Her mother also told us that the pt underwent an experimental stem cell transplant procedure "to prevent the sugar from going too low."  At this point the gap is still open and her BHB is 5.7  --With the K up to 3.7 meq/l, to restart the drip--but decrease the insulin drip to 3.0 U/hr (along with D5NS or D5/LR at 150/hr)--decrease both the insulin and dextrose to limit the potential for hypokalemia.  --She is also mildly hypocalcemic (as she was last admission) and may have partial hypoparathyroidism.  Recheck a PTH and vit D levels.

## 2023-12-14 NOTE — ED ADULT NURSE REASSESSMENT NOTE - NS ED NURSE REASSESS COMMENT FT1
patient BGL noted to trend upwards to 456. AS per provider Lianna insulin drip to be titrated to 10 units/hr. this is also reflected in flowsheets.

## 2023-12-15 DIAGNOSIS — E83.51 HYPOCALCEMIA: ICD-10-CM

## 2023-12-15 DIAGNOSIS — E10.9 TYPE 1 DIABETES MELLITUS WITHOUT COMPLICATIONS: ICD-10-CM

## 2023-12-15 DIAGNOSIS — D64.9 ANEMIA, UNSPECIFIED: ICD-10-CM

## 2023-12-15 DIAGNOSIS — B33.8 OTHER SPECIFIED VIRAL DISEASES: ICD-10-CM

## 2023-12-15 DIAGNOSIS — E11.10 TYPE 2 DIABETES MELLITUS WITH KETOACIDOSIS WITHOUT COMA: ICD-10-CM

## 2023-12-15 DIAGNOSIS — Z29.9 ENCOUNTER FOR PROPHYLACTIC MEASURES, UNSPECIFIED: ICD-10-CM

## 2023-12-15 DIAGNOSIS — F32.9 MAJOR DEPRESSIVE DISORDER, SINGLE EPISODE, UNSPECIFIED: ICD-10-CM

## 2023-12-15 LAB
24R-OH-CALCIDIOL SERPL-MCNC: 22.1 NG/ML — LOW (ref 30–80)
24R-OH-CALCIDIOL SERPL-MCNC: 22.1 NG/ML — LOW (ref 30–80)
ALBUMIN SERPL ELPH-MCNC: 2.9 G/DL — LOW (ref 3.3–5)
ALBUMIN SERPL ELPH-MCNC: 2.9 G/DL — LOW (ref 3.3–5)
ALBUMIN SERPL ELPH-MCNC: 3.1 G/DL — LOW (ref 3.3–5)
ALBUMIN SERPL ELPH-MCNC: 3.1 G/DL — LOW (ref 3.3–5)
ALP SERPL-CCNC: 103 U/L — SIGNIFICANT CHANGE UP (ref 40–120)
ALP SERPL-CCNC: 103 U/L — SIGNIFICANT CHANGE UP (ref 40–120)
ALP SERPL-CCNC: 139 U/L — HIGH (ref 40–120)
ALP SERPL-CCNC: 139 U/L — HIGH (ref 40–120)
ALT FLD-CCNC: 6 U/L — LOW (ref 10–45)
ALT FLD-CCNC: 6 U/L — LOW (ref 10–45)
ALT FLD-CCNC: 8 U/L — LOW (ref 10–45)
ALT FLD-CCNC: 8 U/L — LOW (ref 10–45)
ANION GAP SERPL CALC-SCNC: 14 MMOL/L — SIGNIFICANT CHANGE UP (ref 5–17)
ANION GAP SERPL CALC-SCNC: 14 MMOL/L — SIGNIFICANT CHANGE UP (ref 5–17)
ANION GAP SERPL CALC-SCNC: 21 MMOL/L — HIGH (ref 5–17)
ANION GAP SERPL CALC-SCNC: 21 MMOL/L — HIGH (ref 5–17)
ANION GAP SERPL CALC-SCNC: 8 MMOL/L — SIGNIFICANT CHANGE UP (ref 5–17)
AST SERPL-CCNC: 11 U/L — SIGNIFICANT CHANGE UP (ref 10–40)
AST SERPL-CCNC: 11 U/L — SIGNIFICANT CHANGE UP (ref 10–40)
AST SERPL-CCNC: 16 U/L — SIGNIFICANT CHANGE UP (ref 10–40)
AST SERPL-CCNC: 16 U/L — SIGNIFICANT CHANGE UP (ref 10–40)
BASOPHILS # BLD AUTO: 0.01 K/UL — SIGNIFICANT CHANGE UP (ref 0–0.2)
BASOPHILS # BLD AUTO: 0.01 K/UL — SIGNIFICANT CHANGE UP (ref 0–0.2)
BASOPHILS NFR BLD AUTO: 0.2 % — SIGNIFICANT CHANGE UP (ref 0–2)
BASOPHILS NFR BLD AUTO: 0.2 % — SIGNIFICANT CHANGE UP (ref 0–2)
BILIRUB SERPL-MCNC: 0.2 MG/DL — SIGNIFICANT CHANGE UP (ref 0.2–1.2)
BILIRUB SERPL-MCNC: 0.2 MG/DL — SIGNIFICANT CHANGE UP (ref 0.2–1.2)
BILIRUB SERPL-MCNC: 0.3 MG/DL — SIGNIFICANT CHANGE UP (ref 0.2–1.2)
BILIRUB SERPL-MCNC: 0.3 MG/DL — SIGNIFICANT CHANGE UP (ref 0.2–1.2)
BLD GP AB SCN SERPL QL: NEGATIVE — SIGNIFICANT CHANGE UP
BLD GP AB SCN SERPL QL: NEGATIVE — SIGNIFICANT CHANGE UP
BUN SERPL-MCNC: 11 MG/DL — SIGNIFICANT CHANGE UP (ref 7–23)
BUN SERPL-MCNC: 11 MG/DL — SIGNIFICANT CHANGE UP (ref 7–23)
BUN SERPL-MCNC: 14 MG/DL — SIGNIFICANT CHANGE UP (ref 7–23)
BUN SERPL-MCNC: 14 MG/DL — SIGNIFICANT CHANGE UP (ref 7–23)
BUN SERPL-MCNC: 7 MG/DL — SIGNIFICANT CHANGE UP (ref 7–23)
BUN SERPL-MCNC: 7 MG/DL — SIGNIFICANT CHANGE UP (ref 7–23)
BUN SERPL-MCNC: 8 MG/DL — SIGNIFICANT CHANGE UP (ref 7–23)
BUN SERPL-MCNC: 8 MG/DL — SIGNIFICANT CHANGE UP (ref 7–23)
CALCIUM SERPL-MCNC: 7.6 MG/DL — LOW (ref 8.4–10.5)
CALCIUM SERPL-MCNC: 7.8 MG/DL — LOW (ref 8.4–10.5)
CALCIUM SERPL-MCNC: 7.8 MG/DL — LOW (ref 8.4–10.5)
CALCIUM SERPL-MCNC: 8.2 MG/DL — LOW (ref 8.4–10.5)
CALCIUM SERPL-MCNC: 8.2 MG/DL — LOW (ref 8.4–10.5)
CALCIUM SERPL-MCNC: 8.6 MG/DL — SIGNIFICANT CHANGE UP (ref 8.4–10.5)
CALCIUM SERPL-MCNC: 8.6 MG/DL — SIGNIFICANT CHANGE UP (ref 8.4–10.5)
CHLORIDE SERPL-SCNC: 102 MMOL/L — SIGNIFICANT CHANGE UP (ref 96–108)
CHLORIDE SERPL-SCNC: 115 MMOL/L — HIGH (ref 96–108)
CHLORIDE SERPL-SCNC: 115 MMOL/L — HIGH (ref 96–108)
CHLORIDE SERPL-SCNC: 116 MMOL/L — HIGH (ref 96–108)
CHLORIDE SERPL-SCNC: 116 MMOL/L — HIGH (ref 96–108)
CO2 SERPL-SCNC: 10 MMOL/L — CRITICAL LOW (ref 22–31)
CO2 SERPL-SCNC: 10 MMOL/L — CRITICAL LOW (ref 22–31)
CO2 SERPL-SCNC: 14 MMOL/L — LOW (ref 22–31)
CO2 SERPL-SCNC: 14 MMOL/L — LOW (ref 22–31)
CO2 SERPL-SCNC: 17 MMOL/L — LOW (ref 22–31)
CREAT SERPL-MCNC: 0.64 MG/DL — SIGNIFICANT CHANGE UP (ref 0.5–1.3)
CREAT SERPL-MCNC: 0.64 MG/DL — SIGNIFICANT CHANGE UP (ref 0.5–1.3)
CREAT SERPL-MCNC: 0.67 MG/DL — SIGNIFICANT CHANGE UP (ref 0.5–1.3)
CREAT SERPL-MCNC: 0.67 MG/DL — SIGNIFICANT CHANGE UP (ref 0.5–1.3)
CREAT SERPL-MCNC: 0.74 MG/DL — SIGNIFICANT CHANGE UP (ref 0.5–1.3)
CREAT SERPL-MCNC: 0.74 MG/DL — SIGNIFICANT CHANGE UP (ref 0.5–1.3)
CREAT SERPL-MCNC: 0.78 MG/DL — SIGNIFICANT CHANGE UP (ref 0.5–1.3)
CREAT SERPL-MCNC: 0.78 MG/DL — SIGNIFICANT CHANGE UP (ref 0.5–1.3)
CULTURE RESULTS: SIGNIFICANT CHANGE UP
CULTURE RESULTS: SIGNIFICANT CHANGE UP
EGFR: 109 ML/MIN/1.73M2 — SIGNIFICANT CHANGE UP
EGFR: 109 ML/MIN/1.73M2 — SIGNIFICANT CHANGE UP
EGFR: 117 ML/MIN/1.73M2 — SIGNIFICANT CHANGE UP
EGFR: 117 ML/MIN/1.73M2 — SIGNIFICANT CHANGE UP
EGFR: 126 ML/MIN/1.73M2 — SIGNIFICANT CHANGE UP
EGFR: 126 ML/MIN/1.73M2 — SIGNIFICANT CHANGE UP
EGFR: 127 ML/MIN/1.73M2 — SIGNIFICANT CHANGE UP
EGFR: 127 ML/MIN/1.73M2 — SIGNIFICANT CHANGE UP
EOSINOPHIL # BLD AUTO: 0 K/UL — SIGNIFICANT CHANGE UP (ref 0–0.5)
EOSINOPHIL # BLD AUTO: 0 K/UL — SIGNIFICANT CHANGE UP (ref 0–0.5)
EOSINOPHIL NFR BLD AUTO: 0 % — SIGNIFICANT CHANGE UP (ref 0–6)
EOSINOPHIL NFR BLD AUTO: 0 % — SIGNIFICANT CHANGE UP (ref 0–6)
GLUCOSE BLDC GLUCOMTR-MCNC: 105 MG/DL — HIGH (ref 70–99)
GLUCOSE BLDC GLUCOMTR-MCNC: 105 MG/DL — HIGH (ref 70–99)
GLUCOSE BLDC GLUCOMTR-MCNC: 154 MG/DL — HIGH (ref 70–99)
GLUCOSE BLDC GLUCOMTR-MCNC: 154 MG/DL — HIGH (ref 70–99)
GLUCOSE BLDC GLUCOMTR-MCNC: 196 MG/DL — HIGH (ref 70–99)
GLUCOSE BLDC GLUCOMTR-MCNC: 196 MG/DL — HIGH (ref 70–99)
GLUCOSE BLDC GLUCOMTR-MCNC: 217 MG/DL — HIGH (ref 70–99)
GLUCOSE BLDC GLUCOMTR-MCNC: 217 MG/DL — HIGH (ref 70–99)
GLUCOSE BLDC GLUCOMTR-MCNC: 229 MG/DL — HIGH (ref 70–99)
GLUCOSE BLDC GLUCOMTR-MCNC: 229 MG/DL — HIGH (ref 70–99)
GLUCOSE BLDC GLUCOMTR-MCNC: 292 MG/DL — HIGH (ref 70–99)
GLUCOSE BLDC GLUCOMTR-MCNC: 292 MG/DL — HIGH (ref 70–99)
GLUCOSE BLDC GLUCOMTR-MCNC: 332 MG/DL — HIGH (ref 70–99)
GLUCOSE BLDC GLUCOMTR-MCNC: 332 MG/DL — HIGH (ref 70–99)
GLUCOSE BLDC GLUCOMTR-MCNC: 338 MG/DL — HIGH (ref 70–99)
GLUCOSE BLDC GLUCOMTR-MCNC: 338 MG/DL — HIGH (ref 70–99)
GLUCOSE BLDC GLUCOMTR-MCNC: 385 MG/DL — HIGH (ref 70–99)
GLUCOSE BLDC GLUCOMTR-MCNC: 385 MG/DL — HIGH (ref 70–99)
GLUCOSE BLDC GLUCOMTR-MCNC: 65 MG/DL — LOW (ref 70–99)
GLUCOSE BLDC GLUCOMTR-MCNC: 65 MG/DL — LOW (ref 70–99)
GLUCOSE BLDC GLUCOMTR-MCNC: 82 MG/DL — SIGNIFICANT CHANGE UP (ref 70–99)
GLUCOSE BLDC GLUCOMTR-MCNC: 82 MG/DL — SIGNIFICANT CHANGE UP (ref 70–99)
GLUCOSE BLDC GLUCOMTR-MCNC: 90 MG/DL — SIGNIFICANT CHANGE UP (ref 70–99)
GLUCOSE BLDC GLUCOMTR-MCNC: 90 MG/DL — SIGNIFICANT CHANGE UP (ref 70–99)
GLUCOSE BLDC GLUCOMTR-MCNC: 96 MG/DL — SIGNIFICANT CHANGE UP (ref 70–99)
GLUCOSE BLDC GLUCOMTR-MCNC: 96 MG/DL — SIGNIFICANT CHANGE UP (ref 70–99)
GLUCOSE SERPL-MCNC: 112 MG/DL — HIGH (ref 70–99)
GLUCOSE SERPL-MCNC: 112 MG/DL — HIGH (ref 70–99)
GLUCOSE SERPL-MCNC: 210 MG/DL — HIGH (ref 70–99)
GLUCOSE SERPL-MCNC: 210 MG/DL — HIGH (ref 70–99)
GLUCOSE SERPL-MCNC: 336 MG/DL — HIGH (ref 70–99)
GLUCOSE SERPL-MCNC: 336 MG/DL — HIGH (ref 70–99)
GLUCOSE SERPL-MCNC: 337 MG/DL — HIGH (ref 70–99)
GLUCOSE SERPL-MCNC: 337 MG/DL — HIGH (ref 70–99)
HCT VFR BLD CALC: 26.5 % — LOW (ref 34.5–45)
HCT VFR BLD CALC: 26.5 % — LOW (ref 34.5–45)
HGB BLD-MCNC: 9.8 G/DL — LOW (ref 11.5–15.5)
HGB BLD-MCNC: 9.8 G/DL — LOW (ref 11.5–15.5)
IMM GRANULOCYTES NFR BLD AUTO: 0.9 % — SIGNIFICANT CHANGE UP (ref 0–0.9)
IMM GRANULOCYTES NFR BLD AUTO: 0.9 % — SIGNIFICANT CHANGE UP (ref 0–0.9)
LYMPHOCYTES # BLD AUTO: 1.04 K/UL — SIGNIFICANT CHANGE UP (ref 1–3.3)
LYMPHOCYTES # BLD AUTO: 1.04 K/UL — SIGNIFICANT CHANGE UP (ref 1–3.3)
LYMPHOCYTES # BLD AUTO: 24.6 % — SIGNIFICANT CHANGE UP (ref 13–44)
LYMPHOCYTES # BLD AUTO: 24.6 % — SIGNIFICANT CHANGE UP (ref 13–44)
MAGNESIUM SERPL-MCNC: 1.8 MG/DL — SIGNIFICANT CHANGE UP (ref 1.6–2.6)
MAGNESIUM SERPL-MCNC: 1.9 MG/DL — SIGNIFICANT CHANGE UP (ref 1.6–2.6)
MAGNESIUM SERPL-MCNC: 1.9 MG/DL — SIGNIFICANT CHANGE UP (ref 1.6–2.6)
MAGNESIUM SERPL-MCNC: 2.4 MG/DL — SIGNIFICANT CHANGE UP (ref 1.6–2.6)
MAGNESIUM SERPL-MCNC: 2.4 MG/DL — SIGNIFICANT CHANGE UP (ref 1.6–2.6)
MCHC RBC-ENTMCNC: 28.2 PG — SIGNIFICANT CHANGE UP (ref 27–34)
MCHC RBC-ENTMCNC: 28.2 PG — SIGNIFICANT CHANGE UP (ref 27–34)
MCHC RBC-ENTMCNC: 37 GM/DL — HIGH (ref 32–36)
MCHC RBC-ENTMCNC: 37 GM/DL — HIGH (ref 32–36)
MCV RBC AUTO: 76.4 FL — LOW (ref 80–100)
MCV RBC AUTO: 76.4 FL — LOW (ref 80–100)
MONOCYTES # BLD AUTO: 0.48 K/UL — SIGNIFICANT CHANGE UP (ref 0–0.9)
MONOCYTES # BLD AUTO: 0.48 K/UL — SIGNIFICANT CHANGE UP (ref 0–0.9)
MONOCYTES NFR BLD AUTO: 11.3 % — SIGNIFICANT CHANGE UP (ref 2–14)
MONOCYTES NFR BLD AUTO: 11.3 % — SIGNIFICANT CHANGE UP (ref 2–14)
NEUTROPHILS # BLD AUTO: 2.66 K/UL — SIGNIFICANT CHANGE UP (ref 1.8–7.4)
NEUTROPHILS # BLD AUTO: 2.66 K/UL — SIGNIFICANT CHANGE UP (ref 1.8–7.4)
NEUTROPHILS NFR BLD AUTO: 63 % — SIGNIFICANT CHANGE UP (ref 43–77)
NEUTROPHILS NFR BLD AUTO: 63 % — SIGNIFICANT CHANGE UP (ref 43–77)
NRBC # BLD: 0 /100 WBCS — SIGNIFICANT CHANGE UP (ref 0–0)
NRBC # BLD: 0 /100 WBCS — SIGNIFICANT CHANGE UP (ref 0–0)
PHOSPHATE SERPL-MCNC: 1.9 MG/DL — LOW (ref 2.5–4.5)
PHOSPHATE SERPL-MCNC: 1.9 MG/DL — LOW (ref 2.5–4.5)
PHOSPHATE SERPL-MCNC: 2 MG/DL — LOW (ref 2.5–4.5)
PHOSPHATE SERPL-MCNC: 2 MG/DL — LOW (ref 2.5–4.5)
PHOSPHATE SERPL-MCNC: 2.1 MG/DL — LOW (ref 2.5–4.5)
PHOSPHATE SERPL-MCNC: 2.1 MG/DL — LOW (ref 2.5–4.5)
PHOSPHATE SERPL-MCNC: 2.3 MG/DL — LOW (ref 2.5–4.5)
PHOSPHATE SERPL-MCNC: 2.3 MG/DL — LOW (ref 2.5–4.5)
PLATELET # BLD AUTO: 175 K/UL — SIGNIFICANT CHANGE UP (ref 150–400)
PLATELET # BLD AUTO: 175 K/UL — SIGNIFICANT CHANGE UP (ref 150–400)
POTASSIUM SERPL-MCNC: 3.3 MMOL/L — LOW (ref 3.5–5.3)
POTASSIUM SERPL-MCNC: 3.7 MMOL/L — SIGNIFICANT CHANGE UP (ref 3.5–5.3)
POTASSIUM SERPL-MCNC: 3.7 MMOL/L — SIGNIFICANT CHANGE UP (ref 3.5–5.3)
POTASSIUM SERPL-MCNC: 3.8 MMOL/L — SIGNIFICANT CHANGE UP (ref 3.5–5.3)
POTASSIUM SERPL-MCNC: 3.8 MMOL/L — SIGNIFICANT CHANGE UP (ref 3.5–5.3)
POTASSIUM SERPL-SCNC: 3.3 MMOL/L — LOW (ref 3.5–5.3)
POTASSIUM SERPL-SCNC: 3.7 MMOL/L — SIGNIFICANT CHANGE UP (ref 3.5–5.3)
POTASSIUM SERPL-SCNC: 3.7 MMOL/L — SIGNIFICANT CHANGE UP (ref 3.5–5.3)
POTASSIUM SERPL-SCNC: 3.8 MMOL/L — SIGNIFICANT CHANGE UP (ref 3.5–5.3)
POTASSIUM SERPL-SCNC: 3.8 MMOL/L — SIGNIFICANT CHANGE UP (ref 3.5–5.3)
PROT SERPL-MCNC: 5 G/DL — LOW (ref 6–8.3)
PROT SERPL-MCNC: 5 G/DL — LOW (ref 6–8.3)
PROT SERPL-MCNC: 5.6 G/DL — LOW (ref 6–8.3)
PROT SERPL-MCNC: 5.6 G/DL — LOW (ref 6–8.3)
PTH-INTACT FLD-MCNC: 82 PG/ML — HIGH (ref 15–65)
PTH-INTACT FLD-MCNC: 82 PG/ML — HIGH (ref 15–65)
RBC # BLD: 3.47 M/UL — LOW (ref 3.8–5.2)
RBC # BLD: 3.47 M/UL — LOW (ref 3.8–5.2)
RBC # FLD: 13.1 % — SIGNIFICANT CHANGE UP (ref 10.3–14.5)
RBC # FLD: 13.1 % — SIGNIFICANT CHANGE UP (ref 10.3–14.5)
RH IG SCN BLD-IMP: POSITIVE — SIGNIFICANT CHANGE UP
RH IG SCN BLD-IMP: POSITIVE — SIGNIFICANT CHANGE UP
SODIUM SERPL-SCNC: 130 MMOL/L — LOW (ref 135–145)
SODIUM SERPL-SCNC: 130 MMOL/L — LOW (ref 135–145)
SODIUM SERPL-SCNC: 133 MMOL/L — LOW (ref 135–145)
SODIUM SERPL-SCNC: 133 MMOL/L — LOW (ref 135–145)
SODIUM SERPL-SCNC: 140 MMOL/L — SIGNIFICANT CHANGE UP (ref 135–145)
SODIUM SERPL-SCNC: 140 MMOL/L — SIGNIFICANT CHANGE UP (ref 135–145)
SODIUM SERPL-SCNC: 141 MMOL/L — SIGNIFICANT CHANGE UP (ref 135–145)
SODIUM SERPL-SCNC: 141 MMOL/L — SIGNIFICANT CHANGE UP (ref 135–145)
SPECIMEN SOURCE: SIGNIFICANT CHANGE UP
SPECIMEN SOURCE: SIGNIFICANT CHANGE UP
VIT D25+D1,25 OH+D1,25 PNL SERPL-MCNC: 45.6 PG/ML — SIGNIFICANT CHANGE UP (ref 19.9–79.3)
VIT D25+D1,25 OH+D1,25 PNL SERPL-MCNC: 45.6 PG/ML — SIGNIFICANT CHANGE UP (ref 19.9–79.3)
WBC # BLD: 4.23 K/UL — SIGNIFICANT CHANGE UP (ref 3.8–10.5)
WBC # BLD: 4.23 K/UL — SIGNIFICANT CHANGE UP (ref 3.8–10.5)
WBC # FLD AUTO: 4.23 K/UL — SIGNIFICANT CHANGE UP (ref 3.8–10.5)
WBC # FLD AUTO: 4.23 K/UL — SIGNIFICANT CHANGE UP (ref 3.8–10.5)

## 2023-12-15 PROCEDURE — 99231 SBSQ HOSP IP/OBS SF/LOW 25: CPT | Mod: GC

## 2023-12-15 PROCEDURE — 99232 SBSQ HOSP IP/OBS MODERATE 35: CPT

## 2023-12-15 PROCEDURE — 99222 1ST HOSP IP/OBS MODERATE 55: CPT | Mod: GC

## 2023-12-15 RX ORDER — INSULIN LISPRO 100/ML
4 VIAL (ML) SUBCUTANEOUS
Refills: 0 | Status: DISCONTINUED | OUTPATIENT
Start: 2023-12-15 | End: 2023-12-15

## 2023-12-15 RX ORDER — DEXTROSE 50 % IN WATER 50 %
25 SYRINGE (ML) INTRAVENOUS ONCE
Refills: 0 | Status: DISCONTINUED | OUTPATIENT
Start: 2023-12-15 | End: 2023-12-17

## 2023-12-15 RX ORDER — SODIUM,POTASSIUM PHOSPHATES 278-250MG
1 POWDER IN PACKET (EA) ORAL
Refills: 0 | Status: COMPLETED | OUTPATIENT
Start: 2023-12-15 | End: 2023-12-16

## 2023-12-15 RX ORDER — CHOLECALCIFEROL (VITAMIN D3) 125 MCG
1000 CAPSULE ORAL EVERY 24 HOURS
Refills: 0 | Status: DISCONTINUED | OUTPATIENT
Start: 2023-12-15 | End: 2023-12-17

## 2023-12-15 RX ORDER — ONDANSETRON 8 MG/1
4 TABLET, FILM COATED ORAL EVERY 8 HOURS
Refills: 0 | Status: DISCONTINUED | OUTPATIENT
Start: 2023-12-15 | End: 2023-12-17

## 2023-12-15 RX ORDER — MAGNESIUM SULFATE 500 MG/ML
1 VIAL (ML) INJECTION ONCE
Refills: 0 | Status: COMPLETED | OUTPATIENT
Start: 2023-12-15 | End: 2023-12-15

## 2023-12-15 RX ORDER — SODIUM CHLORIDE 9 MG/ML
1000 INJECTION, SOLUTION INTRAVENOUS
Refills: 0 | Status: DISCONTINUED | OUTPATIENT
Start: 2023-12-15 | End: 2023-12-17

## 2023-12-15 RX ORDER — INSULIN LISPRO 100/ML
VIAL (ML) SUBCUTANEOUS
Refills: 0 | Status: DISCONTINUED | OUTPATIENT
Start: 2023-12-15 | End: 2023-12-16

## 2023-12-15 RX ORDER — DEXTROSE 50 % IN WATER 50 %
25 SYRINGE (ML) INTRAVENOUS ONCE
Refills: 0 | Status: COMPLETED | OUTPATIENT
Start: 2023-12-15 | End: 2023-12-15

## 2023-12-15 RX ORDER — INSULIN HUMAN 100 [IU]/ML
INJECTION, SOLUTION SUBCUTANEOUS AT BEDTIME
Refills: 0 | Status: DISCONTINUED | OUTPATIENT
Start: 2023-12-15 | End: 2023-12-17

## 2023-12-15 RX ORDER — INSULIN LISPRO 100/ML
VIAL (ML) SUBCUTANEOUS
Refills: 0 | Status: DISCONTINUED | OUTPATIENT
Start: 2023-12-15 | End: 2023-12-15

## 2023-12-15 RX ORDER — GLUCAGON INJECTION, SOLUTION 0.5 MG/.1ML
1 INJECTION, SOLUTION SUBCUTANEOUS ONCE
Refills: 0 | Status: DISCONTINUED | OUTPATIENT
Start: 2023-12-15 | End: 2023-12-17

## 2023-12-15 RX ORDER — INSULIN GLARGINE 100 [IU]/ML
8 INJECTION, SOLUTION SUBCUTANEOUS ONCE
Refills: 0 | Status: COMPLETED | OUTPATIENT
Start: 2023-12-15 | End: 2023-12-15

## 2023-12-15 RX ORDER — POTASSIUM CHLORIDE 20 MEQ
40 PACKET (EA) ORAL ONCE
Refills: 0 | Status: COMPLETED | OUTPATIENT
Start: 2023-12-15 | End: 2023-12-15

## 2023-12-15 RX ORDER — ESCITALOPRAM OXALATE 10 MG/1
10 TABLET, FILM COATED ORAL DAILY
Refills: 0 | Status: DISCONTINUED | OUTPATIENT
Start: 2023-12-15 | End: 2023-12-17

## 2023-12-15 RX ORDER — POTASSIUM PHOSPHATE, MONOBASIC POTASSIUM PHOSPHATE, DIBASIC 236; 224 MG/ML; MG/ML
30 INJECTION, SOLUTION INTRAVENOUS ONCE
Refills: 0 | Status: COMPLETED | OUTPATIENT
Start: 2023-12-15 | End: 2023-12-15

## 2023-12-15 RX ORDER — INSULIN GLARGINE 100 [IU]/ML
12 INJECTION, SOLUTION SUBCUTANEOUS AT BEDTIME
Refills: 0 | Status: DISCONTINUED | OUTPATIENT
Start: 2023-12-15 | End: 2023-12-15

## 2023-12-15 RX ORDER — POTASSIUM CHLORIDE 20 MEQ
10 PACKET (EA) ORAL
Refills: 0 | Status: COMPLETED | OUTPATIENT
Start: 2023-12-15 | End: 2023-12-16

## 2023-12-15 RX ORDER — DEXTROSE 50 % IN WATER 50 %
12.5 SYRINGE (ML) INTRAVENOUS ONCE
Refills: 0 | Status: DISCONTINUED | OUTPATIENT
Start: 2023-12-15 | End: 2023-12-17

## 2023-12-15 RX ORDER — SODIUM CHLORIDE 9 MG/ML
1000 INJECTION, SOLUTION INTRAVENOUS
Refills: 0 | Status: DISCONTINUED | OUTPATIENT
Start: 2023-12-15 | End: 2023-12-16

## 2023-12-15 RX ORDER — HUMAN INSULIN 100 [IU]/ML
10 INJECTION, SUSPENSION SUBCUTANEOUS ONCE
Refills: 0 | Status: COMPLETED | OUTPATIENT
Start: 2023-12-15 | End: 2023-12-15

## 2023-12-15 RX ORDER — DEXTROSE 50 % IN WATER 50 %
15 SYRINGE (ML) INTRAVENOUS ONCE
Refills: 0 | Status: DISCONTINUED | OUTPATIENT
Start: 2023-12-15 | End: 2023-12-17

## 2023-12-15 RX ORDER — INSULIN LISPRO 100/ML
5 VIAL (ML) SUBCUTANEOUS
Refills: 0 | Status: DISCONTINUED | OUTPATIENT
Start: 2023-12-15 | End: 2023-12-16

## 2023-12-15 RX ADMIN — Medication 100 GRAM(S): at 03:29

## 2023-12-15 RX ADMIN — Medication 1000 UNIT(S): at 18:11

## 2023-12-15 RX ADMIN — INSULIN GLARGINE 8 UNIT(S): 100 INJECTION, SOLUTION SUBCUTANEOUS at 03:09

## 2023-12-15 RX ADMIN — Medication 1 PACKET(S): at 21:35

## 2023-12-15 RX ADMIN — Medication 4: at 10:49

## 2023-12-15 RX ADMIN — POTASSIUM PHOSPHATE, MONOBASIC POTASSIUM PHOSPHATE, DIBASIC 83.33 MILLIMOLE(S): 236; 224 INJECTION, SOLUTION INTRAVENOUS at 06:14

## 2023-12-15 RX ADMIN — ENOXAPARIN SODIUM 40 MILLIGRAM(S): 100 INJECTION SUBCUTANEOUS at 06:14

## 2023-12-15 RX ADMIN — Medication 100 MILLIEQUIVALENT(S): at 21:35

## 2023-12-15 RX ADMIN — HUMAN INSULIN 10 UNIT(S): 100 INJECTION, SUSPENSION SUBCUTANEOUS at 21:55

## 2023-12-15 RX ADMIN — Medication 4 UNIT(S): at 12:03

## 2023-12-15 RX ADMIN — Medication 4 UNIT(S): at 18:11

## 2023-12-15 RX ADMIN — Medication 40 MILLIEQUIVALENT(S): at 22:11

## 2023-12-15 RX ADMIN — CHLORHEXIDINE GLUCONATE 1 APPLICATION(S): 213 SOLUTION TOPICAL at 06:14

## 2023-12-15 RX ADMIN — Medication 25 GRAM(S): at 02:17

## 2023-12-15 RX ADMIN — Medication 1: at 07:11

## 2023-12-15 RX ADMIN — SODIUM CHLORIDE 500 MILLILITER(S): 9 INJECTION, SOLUTION INTRAVENOUS at 21:55

## 2023-12-15 RX ADMIN — ONDANSETRON 4 MILLIGRAM(S): 8 TABLET, FILM COATED ORAL at 08:32

## 2023-12-15 RX ADMIN — Medication 5: at 18:10

## 2023-12-15 NOTE — PROGRESS NOTE ADULT - PROBLEM SELECTOR PLAN 5
Ca at 7.6 Intact PTH elevated to 82. Vit d 22.1 Ca at 7.6 Intact PTH elevated to 82. Vit d 22.1, likely vitamin D deficiency    - started cholecalciferol 1000units

## 2023-12-15 NOTE — PROGRESS NOTE ADULT - ASSESSMENT
22 y/o female with history of type 1 DM s/p stem cell transplant in July 2023, depression, anxiety, presented with 4 days of HA and URI sx and one day of nausea and vomiting, found to be in DKA and RSV + for which insulin drip and DKA protocol were initiated, now resolved.     Per patient, she reports good glucose control requiring no basal insulin and only short acting 1-3 units with meals (InPen with fiasp: ICR 40, ISF 50) since stem cell transplant in July 2023. Is now requiring much more insulin suggesting that transplant is failing? Will check c-peptide level.    A1C: 14.1 %  C-peptide pending to evaluate insulin reserves after stem cell transplant.  BUN: 7  Creatinine: 0.67  GFR: 126  Weight: 56. 24 y/o female with history of type 1 DM s/p stem cell transplant in July 2023, depression, anxiety, presented with 4 days of HA and URI sx and one day of nausea and vomiting, found to be in DKA and RSV + for which insulin drip and DKA protocol were initiated, now resolved.     Per patient, she reports good glucose control requiring no basal insulin and only short acting 1-3 units with meals (InPen with fiasp: ICR 40, ISF 50) since stem cell transplant in July 2023. Is now requiring much more insulin suggesting that transplant is failing? Will check c-peptide level.    A1C: 14.1 %  C-peptide pending to evaluate insulin reserves after stem cell transplant.  BUN: 7  Creatinine: 0.67  GFR: 126  Weight: 56.

## 2023-12-15 NOTE — PROGRESS NOTE ADULT - SUBJECTIVE AND OBJECTIVE BOX
************STEPDOWN TO RMF************    Hospital Course:   22 y/o female with history of IDDM, depression, anxiety, presented with nausea and vomiting found to have +RSV and DKA (glucose on admission 470, urine ketones 160, urine glucose 1000)  and transferred to [facility] MICU for further management. In MICU, she was put on insulin drips, K was continuosly repleted, recieved bicarbonate. Her anion gap closed the morning of 12/15. She is ready to be stepped down to RMF.     OVERNIGHT EVENTS: : 7 PM BMP: AG 12 (closed x1), K 3.4 and ph 1.3, gave potassium phosphate and 2 phos-NAK. BMP 1 AM: AG 8 (closed x2), K 3.7 ph 2.0 Mg 1.8. Repeat BS 90s, held insulin gtt for 30 minuts and gave 1/2 amp d5 per protocol. Repeat BS 200s, bridge w/ 8u of lantus. Repleted w/ 1g Mg. insulin gtt off, ordered diet. AM hgb 9.8 (from 13) likely dilutional. AM     SUBJECTIVE / INTERVAL HPI: Patient seen and examined at bedside.     VITAL SIGNS:  Vital Signs Last 24 Hrs  T(C): 36.4 (15 Dec 2023 09:59), Max: 36.9 (14 Dec 2023 18:38)  T(F): 97.5 (15 Dec 2023 09:59), Max: 98.4 (14 Dec 2023 18:38)  HR: 93 (15 Dec 2023 08:00) (80 - 101)  BP: 107/59 (15 Dec 2023 08:00) (88/57 - 107/68)  BP(mean): 77 (15 Dec 2023 08:00) (64 - 82)  RR: 19 (15 Dec 2023 08:00) (9 - 24)  SpO2: 100% (15 Dec 2023 08:00) (98% - 100%)    Parameters below as of 15 Dec 2023 08:00  Patient On (Oxygen Delivery Method): room air        PHYSICAL EXAM:    General: alert, in no acute distress  HEENT: NC/AT; PERRL, anicteric sclera; MMM  Neck: supple  Cardiovascular: +S1/S2, RRR  Respiratory: CTA B/L; no W/R/R  Gastrointestinal: soft, NT/ND; +BSx4  Extremities: WWP; no edema, clubbing or cyanosis  Vascular: 2+ radial, DP/PT pulses B/L  Neurological: AAOx3; no focal deficits    MEDICATIONS:  MEDICATIONS  (STANDING):  chlorhexidine 2% Cloths 1 Application(s) Topical <User Schedule>  dextrose 5%. 1000 milliLiter(s) (50 mL/Hr) IV Continuous <Continuous>  dextrose 5%. 1000 milliLiter(s) (100 mL/Hr) IV Continuous <Continuous>  dextrose 50% Injectable 25 Gram(s) IV Push once  dextrose 50% Injectable 12.5 Gram(s) IV Push once  dextrose 50% Injectable 25 Gram(s) IV Push once  enoxaparin Injectable 40 milliGRAM(s) SubCutaneous every 24 hours  glucagon  Injectable 1 milliGRAM(s) IntraMuscular once  insulin lispro (ADMELOG) corrective regimen sliding scale   SubCutaneous Before meals and at bedtime    MEDICATIONS  (PRN):  dextrose Oral Gel 15 Gram(s) Oral once PRN Blood Glucose LESS THAN 70 milliGRAM(s)/deciliter  ondansetron Injectable 4 milliGRAM(s) IV Push every 8 hours PRN Nausea and/or Vomiting      ALLERGIES:  Allergies    No Known Allergies    Intolerances        LABS:                        9.8    4.23  )-----------( 175      ( 15 Dec 2023 04:52 )             26.5     12-15    140  |  115<H>  |  7   ----------------------------<  210<H>  3.8   |  17<L>  |  0.67    Ca    7.8<L>      15 Dec 2023 04:52  Phos  2.3     12-15  Mg     2.4     12-15    TPro  5.0<L>  /  Alb  2.9<L>  /  TBili  0.2  /  DBili  x   /  AST  11  /  ALT  6<L>  /  AlkPhos  103  12-15    PT/INR - ( 14 Dec 2023 03:26 )   PT: 8.9 sec;   INR: 0.77          PTT - ( 14 Dec 2023 03:26 )  PTT:27.1 sec  Urinalysis Basic - ( 15 Dec 2023 04:52 )    Color: x / Appearance: x / SG: x / pH: x  Gluc: 210 mg/dL / Ketone: x  / Bili: x / Urobili: x   Blood: x / Protein: x / Nitrite: x   Leuk Esterase: x / RBC: x / WBC x   Sq Epi: x / Non Sq Epi: x / Bacteria: x      CAPILLARY BLOOD GLUCOSE      POCT Blood Glucose.: 338 mg/dL (15 Dec 2023 10:26)      RADIOLOGY & ADDITIONAL TESTS: Reviewed. ************STEPDOWN TO RMF************    Hospital Course:   22 y/o female with history of IDDM, depression, anxiety, presented with nausea and vomiting found to have +RSV and DKA (glucose on admission 470, urine ketones 160, urine glucose 1000)  and transferred to [facility] MICU for further management. In MICU, she was put on insulin drips, K was continuosly repleted, recieved bicarbonate. Her anion gap closed the morning of 12/15. She is ready to be stepped down to RMF.     OVERNIGHT EVENTS: : 7 PM BMP: AG 12 (closed x1), K 3.4 and ph 1.3, gave potassium phosphate and 2 phos-NAK. BMP 1 AM: AG 8 (closed x2), K 3.7 ph 2.0 Mg 1.8. Repeat BS 90s, held insulin gtt for 30 minuts and gave 1/2 amp d5 per protocol. Repeat BS 200s, bridge w/ 8u of lantus. Repleted w/ 1g Mg. insulin gtt off, ordered diet. AM hgb 9.8 (from 13) likely dilutional. AM     SUBJECTIVE / INTERVAL HPI: Patient seen and examined at bedside.     VITAL SIGNS:  Vital Signs Last 24 Hrs  T(C): 36.4 (15 Dec 2023 09:59), Max: 36.9 (14 Dec 2023 18:38)  T(F): 97.5 (15 Dec 2023 09:59), Max: 98.4 (14 Dec 2023 18:38)  HR: 93 (15 Dec 2023 08:00) (80 - 101)  BP: 107/59 (15 Dec 2023 08:00) (88/57 - 107/68)  BP(mean): 77 (15 Dec 2023 08:00) (64 - 82)  RR: 19 (15 Dec 2023 08:00) (9 - 24)  SpO2: 100% (15 Dec 2023 08:00) (98% - 100%)    Parameters below as of 15 Dec 2023 08:00  Patient On (Oxygen Delivery Method): room air        PHYSICAL EXAM:    General: alert, in no acute distress  HEENT: NC/AT; PERRL, anicteric sclera; MMM  Neck: supple  Cardiovascular: +S1/S2, RRR  Respiratory: CTA B/L; no W/R/R  Gastrointestinal: soft, NT/ND; +BSx4  Extremities: WWP; no edema, clubbing or cyanosis  Vascular: 2+ radial, DP/PT pulses B/L  Neurological: AAOx3; no focal deficits    MEDICATIONS:  MEDICATIONS  (STANDING):  chlorhexidine 2% Cloths 1 Application(s) Topical <User Schedule>  dextrose 5%. 1000 milliLiter(s) (50 mL/Hr) IV Continuous <Continuous>  dextrose 5%. 1000 milliLiter(s) (100 mL/Hr) IV Continuous <Continuous>  dextrose 50% Injectable 25 Gram(s) IV Push once  dextrose 50% Injectable 12.5 Gram(s) IV Push once  dextrose 50% Injectable 25 Gram(s) IV Push once  enoxaparin Injectable 40 milliGRAM(s) SubCutaneous every 24 hours  glucagon  Injectable 1 milliGRAM(s) IntraMuscular once  insulin lispro (ADMELOG) corrective regimen sliding scale   SubCutaneous Before meals and at bedtime    MEDICATIONS  (PRN):  dextrose Oral Gel 15 Gram(s) Oral once PRN Blood Glucose LESS THAN 70 milliGRAM(s)/deciliter  ondansetron Injectable 4 milliGRAM(s) IV Push every 8 hours PRN Nausea and/or Vomiting      ALLERGIES:  Allergies    No Known Allergies    Intolerances        LABS:                        9.8    4.23  )-----------( 175      ( 15 Dec 2023 04:52 )             26.5     12-15    140  |  115<H>  |  7   ----------------------------<  210<H>  3.8   |  17<L>  |  0.67    Ca    7.8<L>      15 Dec 2023 04:52  Phos  2.3     12-15  Mg     2.4     12-15    TPro  5.0<L>  /  Alb  2.9<L>  /  TBili  0.2  /  DBili  x   /  AST  11  /  ALT  6<L>  /  AlkPhos  103  12-15    PT/INR - ( 14 Dec 2023 03:26 )   PT: 8.9 sec;   INR: 0.77          PTT - ( 14 Dec 2023 03:26 )  PTT:27.1 sec  Urinalysis Basic - ( 15 Dec 2023 04:52 )    Color: x / Appearance: x / SG: x / pH: x  Gluc: 210 mg/dL / Ketone: x  / Bili: x / Urobili: x   Blood: x / Protein: x / Nitrite: x   Leuk Esterase: x / RBC: x / WBC x   Sq Epi: x / Non Sq Epi: x / Bacteria: x      CAPILLARY BLOOD GLUCOSE      POCT Blood Glucose.: 338 mg/dL (15 Dec 2023 10:26)      RADIOLOGY & ADDITIONAL TESTS: Reviewed.

## 2023-12-15 NOTE — PROGRESS NOTE ADULT - PROBLEM SELECTOR PLAN 1
# Poorly controlled Type 1 DM  - Lantus 12 units at bedtime.  - Lispro 5 units with meals. Advised to aim for 30 gm of carbs with meals.  - Lispro SHANA before meals and at bedtime.  - Discharge recommendations to be discussed.   - Patient can follow up at discharge with [facility].    Case discussed with Dr. Lunsford. Primary team updated. # Poorly controlled Type 1 DM  - Lantus 12 units at bedtime.  - Lispro 5 units with meals. Advised to aim for 30 gm of carbs with meals.  - Lispro SHANA before meals and at bedtime.  - Discharge recommendations to be discussed.   - Patient can follow up at discharge with [facility].    Case discussed with Dr. Lunsford. Primary team updated.

## 2023-12-15 NOTE — PROGRESS NOTE ADULT - SUBJECTIVE AND OBJECTIVE BOX
SUBJECTIVE / INTERVAL HPI: Patient was seen and examined this morning.  Feeling better today. Reports mild nausea, but not affecting appetite. Insulin drip and dextrose IVF overnight. Gap closed X 2 by MN. Lantus 8 units given at 3AM. Insulin and dextrose IVF stopped around 6AM with FSg 154. Glucose then vargas to 338 after breakfast.    CAPILLARY BLOOD GLUCOSE & INSULIN RECEIVED  237 mg/dL (12-14 @ 06:38)  218 mg/dL (12-14 @ 06:41)  234 mg/dL (12-14 @ 07:41)  249 mg/dL (12-14 @ 08:43)  218 mg/dL (12-14 @ 09:28)  238 mg/dL (12-14 @ 09:32)  188 mg/dL (12-14 @ 10:16)  177 mg/dL (12-14 @ 11:08)  209 mg/dL (12-14 @ 12:17)  243 mg/dL (12-14 @ 13:01)  228 mg/dL (12-14 @ 13:16)  243 mg/dL (12-14 @ 14:24)  268 mg/dL (12-14 @ 15:21)  184 mg/dL (12-14 @ 16:01)  256 mg/dL (12-14 @ 16:20)  243 mg/dL (12-14 @ 16:55)  234 mg/dL (12-14 @ 18:01)  223 mg/dL (12-14 @ 19:07)  192 mg/dL (12-14 @ 19:54)  228 mg/dL (12-14 @ 20:06)  175 mg/dL (12-14 @ 21:04)  155 mg/dL (12-14 @ 22:08)  133 mg/dL (12-14 @ 23:01)  65 mg/dL (12-15 @ 00:00)  105 mg/dL (12-15 @ 00:06)  112 mg/dL (12-15 @ 00:10)  96 mg/dL (12-15 @ 01:17)  82 mg/dL (12-15 @ 01:27)  90 mg/dL (12-15 @ 01:29)  217 mg/dL (12-15 @ 02:09)  229 mg/dL (12-15 @ 03:05) - Lantus 8  196 mg/dL (12-15 @ 04:02)  210 mg/dL (12-15 @ 04:52)   Dextrose IVF and insulin drip d/c'd around 6AM.  154 mg/dL (12-15 @ 07:02) - Lispro 1. Ate 1 toast, mcdonnell, 1/2 banana.  338 mg/dL (12-15 @ 10:26) - Lispro 4  332 mg/dL (12-15 @ 11:43) - Lispro 4. Ate salmon and rice.  385 mg/dL (12-15 @ 17:46)      REVIEW OF SYSTEMS  Constitutional:  Negative fever, chills or loss of appetite.  Eyes:  Negative blurry vision or double vision.  Cardiovascular:  Negative for chest pain or palpitations.  Respiratory:  Negative for cough, wheezing, or shortness of breath.    Gastrointestinal:  Negative for nausea, vomiting, diarrhea, constipation, or abdominal pain.  Genitourinary:  Negative frequency, urgency or dysuria.  Neurologic:  No headache, confusion, dizziness, lightheadedness.    PHYSICAL EXAM  Vital Signs Last 24 Hrs  T(C): 36.2 (15 Dec 2023 13:21), Max: 36.9 (14 Dec 2023 18:38)  T(F): 97.1 (15 Dec 2023 13:21), Max: 98.4 (14 Dec 2023 18:38)  HR: 74 (15 Dec 2023 13:21) (74 - 101)  BP: 110/74 (15 Dec 2023 13:21) (88/57 - 110/74)  BP(mean): 79 (15 Dec 2023 11:00) (68 - 82)  RR: 18 (15 Dec 2023 13:21) (9 - 24)  SpO2: 98% (15 Dec 2023 13:21) (98% - 100%)    Parameters below as of 15 Dec 2023 13:21  Patient On (Oxygen Delivery Method): room air        Constitutional: Awake, alert, in no acute distress.   HEENT: Normocephalic, atraumatic, GABO.  Respiratory: Lungs clear to ausculation bilaterally.   Cardiovascular: regular rhythm, normal S1 and S2, no audible murmurs.   GI: soft, non-tender, non-distended, bowel sounds present.  Extremities: No lower extremity edema.  Psychiatric: AAO x 3. Normal affect/mood.     LABS  CBC - WBC/HGB/HTC/PLT: 4.23/9.8/26.5/175 (12-15-23)  BMP - Na/K/Cl/Bicarb/BUN/Cr/Gluc/AG/eGFR: 140/3.8/115/17/7/0.67/210/8/126 (12-15-23)  Ca - 7.8 (12-15-23)  Phos - 2.3 (12-15-23)  Mg - 2.4 (12-15-23)  LFT - Alb/Tprot/Tbili/Dbili/AlkPhos/ALT/AST: 2.9/--/0.2/--/103/6/11 (12-15-23)  PT/aPTT/INR: 8.9/27.1/0.77 (12-14-23)       MEDICATIONS  MEDICATIONS  (STANDING):  chlorhexidine 2% Cloths 1 Application(s) Topical <User Schedule>  cholecalciferol 1000 Unit(s) Oral every 24 hours  dextrose 5%. 1000 milliLiter(s) (50 mL/Hr) IV Continuous <Continuous>  dextrose 5%. 1000 milliLiter(s) (100 mL/Hr) IV Continuous <Continuous>  dextrose 50% Injectable 25 Gram(s) IV Push once  dextrose 50% Injectable 25 Gram(s) IV Push once  dextrose 50% Injectable 12.5 Gram(s) IV Push once  enoxaparin Injectable 40 milliGRAM(s) SubCutaneous every 24 hours  escitalopram 10 milliGRAM(s) Oral daily  glucagon  Injectable 1 milliGRAM(s) IntraMuscular once  insulin lispro (ADMELOG) corrective regimen sliding scale   SubCutaneous Before meals and at bedtime  insulin lispro Injectable (ADMELOG) 4 Unit(s) SubCutaneous three times a day before meals    MEDICATIONS  (PRN):  dextrose Oral Gel 15 Gram(s) Oral once PRN Blood Glucose LESS THAN 70 milliGRAM(s)/deciliter  ondansetron Injectable 4 milliGRAM(s) IV Push every 8 hours PRN Nausea and/or Vomiting

## 2023-12-15 NOTE — PROGRESS NOTE ADULT - PROBLEM SELECTOR PLAN 3
F/u home dose of Lexapro in AM.   - restart as tolerated F/u home dose of Lexapro 10 qD    - started on lexapro 10mg qD

## 2023-12-15 NOTE — PROGRESS NOTE ADULT - PROBLEM SELECTOR PLAN 5
Ca at 7.6 Intact PTH elevated to 82. Vit d 22.1, likely vitamin D deficiency    - started cholecalciferol 1000units

## 2023-12-15 NOTE — PROGRESS NOTE ADULT - PROBLEM SELECTOR PLAN 4
Hgb 9.8 MCV 76.4. Mentzer index 22.    - f/u iron studies  - transfuse if hb<7  - maintain active t/s

## 2023-12-15 NOTE — PROGRESS NOTE ADULT - ASSESSMENT
24 y/o female with history of IDDM, depression, anxiety, presented with nausea and vomiting found to have +RSV and DKA and transferred to [facility] MICU for further management. 22 y/o female with history of IDDM, depression, anxiety, presented with nausea and vomiting found to have +RSV and DKA and transferred to [facility] MICU for further management.

## 2023-12-15 NOTE — PROGRESS NOTE ADULT - ASSESSMENT
24 y/o female with history of IDDM, depression, anxiety, presented with nausea and vomiting found to have +RSV and DKA and transferred to [facility] MICU for further management. 22 y/o female with history of IDDM, depression, anxiety, presented with nausea and vomiting found to have +RSV and DKA and transferred to [facility] MICU for further management.

## 2023-12-15 NOTE — PROGRESS NOTE ADULT - SUBJECTIVE AND OBJECTIVE BOX
**INCOMPLETE NOTE  **Transfer from [facility] to Telemetry**  Hospital Course: 22 y/o female with history of IDDM, depression, anxiety, presented with nausea and vomiting found to have +RSV and DKA (glucose on admission 470, urine ketones 160, urine glucose 1000)  and transferred to [facility] MICU for further management. In MICU, she was put on insulin drips, K was continuously repleted, recieved bicarbonate. Her anion gap closed the morning of 12/15. She was stepped down to F on 12/5 but repeat labs showed her anion gap opened to 21, glucose 292, CO2 10. ICU was consulted.     SUBJECTIVE:  Patient seen and examined at bedside. Patient endorsing nausea. Denies headaches, blurry vision, chest pain, SOB, polydipsia/polyuria, vomiting.     Vital Signs Last 12 Hrs  T(F): 97.1 (12-15-23 @ 13:21), Max: 97.1 (12-15-23 @ 13:21)  HR: 74 (12-15-23 @ 13:21) (74 - 79)  BP: 110/74 (12-15-23 @ 13:21) (98/68 - 110/74)  BP(mean): 79 (12-15-23 @ 11:00) (79 - 79)  RR: 18 (12-15-23 @ 13:21) (18 - 19)  SpO2: 98% (12-15-23 @ 13:21) (98% - 100%)  I&O's Summary    14 Dec 2023 07:01  -  15 Dec 2023 07:00  --------------------------------------------------------  IN: 5087 mL / OUT: 4850 mL / NET: 237 mL    15 Dec 2023 07:01  -  15 Dec 2023 22:47  --------------------------------------------------------  IN: 249.9 mL / OUT: 1000 mL / NET: -750.1 mL        PHYSICAL EXAM:  Constitutional: NAD, comfortable in bed.  HEENT: NC/AT, PERRLA, EOMI, no conjunctival pallor or scleral icterus, MMM  Neck: Supple, no JVD  Respiratory: CTA B/L. No w/r/r.   Cardiovascular: RRR, normal S1 and S2, no m/r/g.   Gastrointestinal: +BS, soft NTND, no guarding or rebound tenderness, no palpable masses   Extremities: wwp; no cyanosis, clubbing or edema.   Vascular: Pulses equal and strong throughout.   Neurological: AAOx3, no CN deficits, strength and sensation intact throughout.   Skin: No gross skin abnormalities or rashes        LABS:                        9.8    4.23  )-----------( 175      ( 15 Dec 2023 04:52 )             26.5     12-15    130<L>  |  102  |  11  ----------------------------<  337<H>  3.3<L>   |  14<L>  |  0.78    Ca    8.2<L>      15 Dec 2023 21:41  Phos  1.9     12-15  Mg     1.8     12-15    TPro  5.6<L>  /  Alb  3.1<L>  /  TBili  0.3  /  DBili  x   /  AST  16  /  ALT  8<L>  /  AlkPhos  139<H>  12-15    PT/INR - ( 14 Dec 2023 03:26 )   PT: 8.9 sec;   INR: 0.77          PTT - ( 14 Dec 2023 03:26 )  PTT:27.1 sec  Urinalysis Basic - ( 15 Dec 2023 21:41 )    Color: x / Appearance: x / SG: x / pH: x  Gluc: 337 mg/dL / Ketone: x  / Bili: x / Urobili: x   Blood: x / Protein: x / Nitrite: x   Leuk Esterase: x / RBC: x / WBC x   Sq Epi: x / Non Sq Epi: x / Bacteria: x          RADIOLOGY & ADDITIONAL TESTS:    MEDICATIONS  (STANDING):  chlorhexidine 2% Cloths 1 Application(s) Topical <User Schedule>  cholecalciferol 1000 Unit(s) Oral every 24 hours  dextrose 5%. 1000 milliLiter(s) (50 mL/Hr) IV Continuous <Continuous>  dextrose 5%. 1000 milliLiter(s) (100 mL/Hr) IV Continuous <Continuous>  dextrose 50% Injectable 25 Gram(s) IV Push once  dextrose 50% Injectable 12.5 Gram(s) IV Push once  dextrose 50% Injectable 25 Gram(s) IV Push once  enoxaparin Injectable 40 milliGRAM(s) SubCutaneous every 24 hours  escitalopram 10 milliGRAM(s) Oral daily  glucagon  Injectable 1 milliGRAM(s) IntraMuscular once  insulin lispro (ADMELOG) corrective regimen sliding scale   SubCutaneous three times a day before meals  insulin lispro Injectable (ADMELOG) 5 Unit(s) SubCutaneous three times a day before meals  insulin regular  human corrective regimen sliding scale   SubCutaneous at bedtime  potassium chloride  10 mEq/100 mL IVPB 10 milliEquivalent(s) IV Intermittent every 1 hour  potassium phosphate / sodium phosphate Powder (PHOS-NaK) 1 Packet(s) Oral every 2 hours  sodium chloride 0.45% 1000 milliLiter(s) (500 mL/Hr) IV Continuous <Continuous>    MEDICATIONS  (PRN):  dextrose Oral Gel 15 Gram(s) Oral once PRN Blood Glucose LESS THAN 70 milliGRAM(s)/deciliter  ondansetron Injectable 4 milliGRAM(s) IV Push every 8 hours PRN Nausea and/or Vomiting   **INCOMPLETE NOTE  **Transfer from [facility] to Telemetry**  Hospital Course: 24 y/o female with history of IDDM, depression, anxiety, presented with nausea and vomiting found to have +RSV and DKA (glucose on admission 470, urine ketones 160, urine glucose 1000)  and transferred to [facility] MICU for further management. In MICU, she was put on insulin drips, K was continuously repleted, recieved bicarbonate. Her anion gap closed the morning of 12/15. She was stepped down to F on 12/5 but repeat labs showed her anion gap opened to 21, glucose 292, CO2 10. ICU was consulted.     SUBJECTIVE:  Patient seen and examined at bedside. Patient endorsing nausea. Denies headaches, blurry vision, chest pain, SOB, polydipsia/polyuria, vomiting.     Vital Signs Last 12 Hrs  T(F): 97.1 (12-15-23 @ 13:21), Max: 97.1 (12-15-23 @ 13:21)  HR: 74 (12-15-23 @ 13:21) (74 - 79)  BP: 110/74 (12-15-23 @ 13:21) (98/68 - 110/74)  BP(mean): 79 (12-15-23 @ 11:00) (79 - 79)  RR: 18 (12-15-23 @ 13:21) (18 - 19)  SpO2: 98% (12-15-23 @ 13:21) (98% - 100%)  I&O's Summary    14 Dec 2023 07:01  -  15 Dec 2023 07:00  --------------------------------------------------------  IN: 5087 mL / OUT: 4850 mL / NET: 237 mL    15 Dec 2023 07:01  -  15 Dec 2023 22:47  --------------------------------------------------------  IN: 249.9 mL / OUT: 1000 mL / NET: -750.1 mL        PHYSICAL EXAM:  Constitutional: NAD, comfortable in bed.  HEENT: NC/AT, PERRLA, EOMI, no conjunctival pallor or scleral icterus, MMM  Neck: Supple, no JVD  Respiratory: CTA B/L. No w/r/r.   Cardiovascular: RRR, normal S1 and S2, no m/r/g.   Gastrointestinal: +BS, soft NTND, no guarding or rebound tenderness, no palpable masses   Extremities: wwp; no cyanosis, clubbing or edema.   Vascular: Pulses equal and strong throughout.   Neurological: AAOx3, no CN deficits, strength and sensation intact throughout.   Skin: No gross skin abnormalities or rashes        LABS:                        9.8    4.23  )-----------( 175      ( 15 Dec 2023 04:52 )             26.5     12-15    130<L>  |  102  |  11  ----------------------------<  337<H>  3.3<L>   |  14<L>  |  0.78    Ca    8.2<L>      15 Dec 2023 21:41  Phos  1.9     12-15  Mg     1.8     12-15    TPro  5.6<L>  /  Alb  3.1<L>  /  TBili  0.3  /  DBili  x   /  AST  16  /  ALT  8<L>  /  AlkPhos  139<H>  12-15    PT/INR - ( 14 Dec 2023 03:26 )   PT: 8.9 sec;   INR: 0.77          PTT - ( 14 Dec 2023 03:26 )  PTT:27.1 sec  Urinalysis Basic - ( 15 Dec 2023 21:41 )    Color: x / Appearance: x / SG: x / pH: x  Gluc: 337 mg/dL / Ketone: x  / Bili: x / Urobili: x   Blood: x / Protein: x / Nitrite: x   Leuk Esterase: x / RBC: x / WBC x   Sq Epi: x / Non Sq Epi: x / Bacteria: x          RADIOLOGY & ADDITIONAL TESTS:    MEDICATIONS  (STANDING):  chlorhexidine 2% Cloths 1 Application(s) Topical <User Schedule>  cholecalciferol 1000 Unit(s) Oral every 24 hours  dextrose 5%. 1000 milliLiter(s) (50 mL/Hr) IV Continuous <Continuous>  dextrose 5%. 1000 milliLiter(s) (100 mL/Hr) IV Continuous <Continuous>  dextrose 50% Injectable 25 Gram(s) IV Push once  dextrose 50% Injectable 12.5 Gram(s) IV Push once  dextrose 50% Injectable 25 Gram(s) IV Push once  enoxaparin Injectable 40 milliGRAM(s) SubCutaneous every 24 hours  escitalopram 10 milliGRAM(s) Oral daily  glucagon  Injectable 1 milliGRAM(s) IntraMuscular once  insulin lispro (ADMELOG) corrective regimen sliding scale   SubCutaneous three times a day before meals  insulin lispro Injectable (ADMELOG) 5 Unit(s) SubCutaneous three times a day before meals  insulin regular  human corrective regimen sliding scale   SubCutaneous at bedtime  potassium chloride  10 mEq/100 mL IVPB 10 milliEquivalent(s) IV Intermittent every 1 hour  potassium phosphate / sodium phosphate Powder (PHOS-NaK) 1 Packet(s) Oral every 2 hours  sodium chloride 0.45% 1000 milliLiter(s) (500 mL/Hr) IV Continuous <Continuous>    MEDICATIONS  (PRN):  dextrose Oral Gel 15 Gram(s) Oral once PRN Blood Glucose LESS THAN 70 milliGRAM(s)/deciliter  ondansetron Injectable 4 milliGRAM(s) IV Push every 8 hours PRN Nausea and/or Vomiting   **INCOMPLETE NOTE  **Transfer from [facility] to Telemetry**  Hospital Course: 24 y/o female with history of IDDM, depression, anxiety, presented with nausea and vomiting found to have +RSV and DKA (glucose on admission 470, urine ketones 160, urine glucose 1000)  and transferred to [facility] MICU for further management. In MICU, she was put on insulin drips, K was continuously repleted, recieved bicarbonate. Her anion gap closed the morning of 12/15. She was stepped down to F on 12/5 but repeat labs showed her anion gap opened to 21, glucose 292, CO2 10. ICU was consulted.     SUBJECTIVE:  Patient seen and examined at bedside. Patient endorsing nausea. Denies headaches, blurry vision, chest pain, SOB, polydipsia/polyuria, vomiting.     Vital Signs Last 12 Hrs  T(F): 97.1 (12-15-23 @ 13:21), Max: 97.1 (12-15-23 @ 13:21)  HR: 74 (12-15-23 @ 13:21) (74 - 79)  BP: 110/74 (12-15-23 @ 13:21) (98/68 - 110/74)  BP(mean): 79 (12-15-23 @ 11:00) (79 - 79)  RR: 18 (12-15-23 @ 13:21) (18 - 19)  SpO2: 98% (12-15-23 @ 13:21) (98% - 100%)  I&O's Summary    14 Dec 2023 07:01  -  15 Dec 2023 07:00  --------------------------------------------------------  IN: 5087 mL / OUT: 4850 mL / NET: 237 mL    15 Dec 2023 07:01  -  15 Dec 2023 22:47  --------------------------------------------------------  IN: 249.9 mL / OUT: 1000 mL / NET: -750.1 mL        PHYSICAL EXAM:  General: in no acute distress  Eyes: EOMI intact bilaterally  HENT: Moist mucous membranes  Neck: Trachea midline  Lungs: CTA B/L, no W/R/R  Cardiovascular: RRR, no M/R/G  Abdomen: soft non tender nondistended        LABS:                        9.8    4.23  )-----------( 175      ( 15 Dec 2023 04:52 )             26.5     12-15    130<L>  |  102  |  11  ----------------------------<  337<H>  3.3<L>   |  14<L>  |  0.78    Ca    8.2<L>      15 Dec 2023 21:41  Phos  1.9     12-15  Mg     1.8     12-15    TPro  5.6<L>  /  Alb  3.1<L>  /  TBili  0.3  /  DBili  x   /  AST  16  /  ALT  8<L>  /  AlkPhos  139<H>  12-15    PT/INR - ( 14 Dec 2023 03:26 )   PT: 8.9 sec;   INR: 0.77          PTT - ( 14 Dec 2023 03:26 )  PTT:27.1 sec  Urinalysis Basic - ( 15 Dec 2023 21:41 )    Color: x / Appearance: x / SG: x / pH: x  Gluc: 337 mg/dL / Ketone: x  / Bili: x / Urobili: x   Blood: x / Protein: x / Nitrite: x   Leuk Esterase: x / RBC: x / WBC x   Sq Epi: x / Non Sq Epi: x / Bacteria: x          RADIOLOGY & ADDITIONAL TESTS:    MEDICATIONS  (STANDING):  chlorhexidine 2% Cloths 1 Application(s) Topical <User Schedule>  cholecalciferol 1000 Unit(s) Oral every 24 hours  dextrose 5%. 1000 milliLiter(s) (50 mL/Hr) IV Continuous <Continuous>  dextrose 5%. 1000 milliLiter(s) (100 mL/Hr) IV Continuous <Continuous>  dextrose 50% Injectable 25 Gram(s) IV Push once  dextrose 50% Injectable 12.5 Gram(s) IV Push once  dextrose 50% Injectable 25 Gram(s) IV Push once  enoxaparin Injectable 40 milliGRAM(s) SubCutaneous every 24 hours  escitalopram 10 milliGRAM(s) Oral daily  glucagon  Injectable 1 milliGRAM(s) IntraMuscular once  insulin lispro (ADMELOG) corrective regimen sliding scale   SubCutaneous three times a day before meals  insulin lispro Injectable (ADMELOG) 5 Unit(s) SubCutaneous three times a day before meals  insulin regular  human corrective regimen sliding scale   SubCutaneous at bedtime  potassium chloride  10 mEq/100 mL IVPB 10 milliEquivalent(s) IV Intermittent every 1 hour  potassium phosphate / sodium phosphate Powder (PHOS-NaK) 1 Packet(s) Oral every 2 hours  sodium chloride 0.45% 1000 milliLiter(s) (500 mL/Hr) IV Continuous <Continuous>    MEDICATIONS  (PRN):  dextrose Oral Gel 15 Gram(s) Oral once PRN Blood Glucose LESS THAN 70 milliGRAM(s)/deciliter  ondansetron Injectable 4 milliGRAM(s) IV Push every 8 hours PRN Nausea and/or Vomiting   **INCOMPLETE NOTE  **Transfer from [facility] to Telemetry**  Hospital Course: 24 y/o female with history of IDDM, depression, anxiety, presented with nausea and vomiting found to have +RSV and DKA (glucose on admission 470, urine ketones 160, urine glucose 1000)  and transferred to [facility] MICU for further management. In MICU, she was put on insulin drips, K was continuously repleted, recieved bicarbonate. Her anion gap closed the morning of 12/15. She was stepped down to F on 12/5 but repeat labs showed her anion gap opened to 21, glucose 292, CO2 10. ICU was consulted.     SUBJECTIVE:  Patient seen and examined at bedside. Patient endorsing nausea. Denies headaches, blurry vision, chest pain, SOB, polydipsia/polyuria, vomiting.     Vital Signs Last 12 Hrs  T(F): 97.1 (12-15-23 @ 13:21), Max: 97.1 (12-15-23 @ 13:21)  HR: 74 (12-15-23 @ 13:21) (74 - 79)  BP: 110/74 (12-15-23 @ 13:21) (98/68 - 110/74)  BP(mean): 79 (12-15-23 @ 11:00) (79 - 79)  RR: 18 (12-15-23 @ 13:21) (18 - 19)  SpO2: 98% (12-15-23 @ 13:21) (98% - 100%)  I&O's Summary    14 Dec 2023 07:01  -  15 Dec 2023 07:00  --------------------------------------------------------  IN: 5087 mL / OUT: 4850 mL / NET: 237 mL    15 Dec 2023 07:01  -  15 Dec 2023 22:47  --------------------------------------------------------  IN: 249.9 mL / OUT: 1000 mL / NET: -750.1 mL        PHYSICAL EXAM:  General: in no acute distress  Eyes: EOMI intact bilaterally  HENT: Moist mucous membranes  Neck: Trachea midline  Lungs: CTA B/L, no W/R/R  Cardiovascular: RRR, no M/R/G  Abdomen: soft non tender nondistended        LABS:                        9.8    4.23  )-----------( 175      ( 15 Dec 2023 04:52 )             26.5     12-15    130<L>  |  102  |  11  ----------------------------<  337<H>  3.3<L>   |  14<L>  |  0.78    Ca    8.2<L>      15 Dec 2023 21:41  Phos  1.9     12-15  Mg     1.8     12-15    TPro  5.6<L>  /  Alb  3.1<L>  /  TBili  0.3  /  DBili  x   /  AST  16  /  ALT  8<L>  /  AlkPhos  139<H>  12-15    PT/INR - ( 14 Dec 2023 03:26 )   PT: 8.9 sec;   INR: 0.77          PTT - ( 14 Dec 2023 03:26 )  PTT:27.1 sec  Urinalysis Basic - ( 15 Dec 2023 21:41 )    Color: x / Appearance: x / SG: x / pH: x  Gluc: 337 mg/dL / Ketone: x  / Bili: x / Urobili: x   Blood: x / Protein: x / Nitrite: x   Leuk Esterase: x / RBC: x / WBC x   Sq Epi: x / Non Sq Epi: x / Bacteria: x          RADIOLOGY & ADDITIONAL TESTS:    MEDICATIONS  (STANDING):  chlorhexidine 2% Cloths 1 Application(s) Topical <User Schedule>  cholecalciferol 1000 Unit(s) Oral every 24 hours  dextrose 5%. 1000 milliLiter(s) (50 mL/Hr) IV Continuous <Continuous>  dextrose 5%. 1000 milliLiter(s) (100 mL/Hr) IV Continuous <Continuous>  dextrose 50% Injectable 25 Gram(s) IV Push once  dextrose 50% Injectable 12.5 Gram(s) IV Push once  dextrose 50% Injectable 25 Gram(s) IV Push once  enoxaparin Injectable 40 milliGRAM(s) SubCutaneous every 24 hours  escitalopram 10 milliGRAM(s) Oral daily  glucagon  Injectable 1 milliGRAM(s) IntraMuscular once  insulin lispro (ADMELOG) corrective regimen sliding scale   SubCutaneous three times a day before meals  insulin lispro Injectable (ADMELOG) 5 Unit(s) SubCutaneous three times a day before meals  insulin regular  human corrective regimen sliding scale   SubCutaneous at bedtime  potassium chloride  10 mEq/100 mL IVPB 10 milliEquivalent(s) IV Intermittent every 1 hour  potassium phosphate / sodium phosphate Powder (PHOS-NaK) 1 Packet(s) Oral every 2 hours  sodium chloride 0.45% 1000 milliLiter(s) (500 mL/Hr) IV Continuous <Continuous>    MEDICATIONS  (PRN):  dextrose Oral Gel 15 Gram(s) Oral once PRN Blood Glucose LESS THAN 70 milliGRAM(s)/deciliter  ondansetron Injectable 4 milliGRAM(s) IV Push every 8 hours PRN Nausea and/or Vomiting

## 2023-12-15 NOTE — CONSULT NOTE ADULT - SUBJECTIVE AND OBJECTIVE BOX
24 y/o female with history of IDDM, depression, anxiety, presented with nausea and vomiting found to have +RSV and DKA (glucose on admission 470, urine ketones 160, urine glucose 1000)  and transferred to [facility] MICU for further management. In MICU, she was put on insulin drips, K was continuously repleted, recieved bicarbonate. Her anion gap closed the morning of 12/15, she was bridged with 8 units of Lantus.  Subsequently remained stable and was stepped down to regional floors.  Repeat labs 12/15 evening with Bicarb 10 and anion gap 21, ICU consulted.  Patient evaluated bedside, reports she feels much better, good po intake throughout the day.  She does not report any fever, chills, dizziness, weakness, HA, Changes in vision, CP, palpitations, SOB, cough, N/V/D/C, dysuria, changes in bowel movements, LE edema. ROS otherwise negative.    VITAL SIGNS:  T(F): 97.1 (12-15-23 @ 13:21)  HR: 74 (12-15-23 @ 13:21)  BP: 110/74 (12-15-23 @ 13:21)  RR: 18 (12-15-23 @ 13:21)  SpO2: 98% (12-15-23 @ 13:21)  Wt(kg): --    PHYSICAL EXAM:  Constitutional: resting comfortably, no acute distress  HEENT: PERRL, EOMI, sclera non-icteric, neck supple, trachea midline, no masses, no JVD, MMM, good dentition  Respiratory: CTA b/l, good air entry b/l, no wheezing, no rhonchi, no rales, without accessory muscle use and no intercostal retractions  Cardiovascular: RRR, normal S1S2, no M/R/G  Gastrointestinal: soft, NTND, no masses palpable, BS normal  Extremities: Warm, well perfused, pulses equal bilateral upper and lower extremities, no edema, no clubbing  Neurological: AAOx3, CN Grossly intact  Skin: Normal temperature, warm, dry    MEDICATIONS  (STANDING):  chlorhexidine 2% Cloths 1 Application(s) Topical <User Schedule>  cholecalciferol 1000 Unit(s) Oral every 24 hours  dextrose 5%. 1000 milliLiter(s) (50 mL/Hr) IV Continuous <Continuous>  dextrose 5%. 1000 milliLiter(s) (100 mL/Hr) IV Continuous <Continuous>  dextrose 50% Injectable 25 Gram(s) IV Push once  dextrose 50% Injectable 12.5 Gram(s) IV Push once  dextrose 50% Injectable 25 Gram(s) IV Push once  enoxaparin Injectable 40 milliGRAM(s) SubCutaneous every 24 hours  escitalopram 10 milliGRAM(s) Oral daily  glucagon  Injectable 1 milliGRAM(s) IntraMuscular once  insulin lispro (ADMELOG) corrective regimen sliding scale   SubCutaneous three times a day before meals  insulin lispro Injectable (ADMELOG) 5 Unit(s) SubCutaneous three times a day before meals  insulin regular  human corrective regimen sliding scale   SubCutaneous at bedtime  potassium chloride   Powder 40 milliEquivalent(s) Oral once  potassium chloride  10 mEq/100 mL IVPB 10 milliEquivalent(s) IV Intermittent every 1 hour  potassium phosphate / sodium phosphate Powder (PHOS-NaK) 1 Packet(s) Oral every 2 hours  sodium chloride 0.45% 1000 milliLiter(s) (500 mL/Hr) IV Continuous <Continuous>    MEDICATIONS  (PRN):  dextrose Oral Gel 15 Gram(s) Oral once PRN Blood Glucose LESS THAN 70 milliGRAM(s)/deciliter  ondansetron Injectable 4 milliGRAM(s) IV Push every 8 hours PRN Nausea and/or Vomiting  Allergies    No Known Allergies    Intolerances    LABS:                        9.8    4.23  )-----------( 175      ( 15 Dec 2023 04:52 )             26.5     12-15    133<L>  |  102  |  14  ----------------------------<  336<H>  3.3<L>   |  10<LL>  |  0.74    Ca    8.6      15 Dec 2023 19:37  Phos  2.1     12-15  Mg     1.9     12-15    TPro  5.0<L>  /  Alb  2.9<L>  /  TBili  0.2  /  DBili  x   /  AST  11  /  ALT  6<L>  /  AlkPhos  103  12-15    PT/INR - ( 14 Dec 2023 03:26 )   PT: 8.9 sec;   INR: 0.77        PTT - ( 14 Dec 2023 03:26 )  PTT:27.1 sec  Urinalysis Basic - ( 15 Dec 2023 19:37 )    Color: x / Appearance: x / SG: x / pH: x  Gluc: 336 mg/dL / Ketone: x  / Bili: x / Urobili: x   Blood: x / Protein: x / Nitrite: x   Leuk Esterase: x / RBC: x / WBC x   Sq Epi: x / Non Sq Epi: x / Bacteria: x    RADIOLOGY & ADDITIONAL TESTS:  Reviewed 22 y/o female with history of IDDM, depression, anxiety, presented with nausea and vomiting found to have +RSV and DKA (glucose on admission 470, urine ketones 160, urine glucose 1000)  and transferred to [facility] MICU for further management. In MICU, she was put on insulin drips, K was continuously repleted, recieved bicarbonate. Her anion gap closed the morning of 12/15, she was bridged with 8 units of Lantus.  Subsequently remained stable and was stepped down to regional floors.  Repeat labs 12/15 evening with Bicarb 10 and anion gap 21, ICU consulted.  Patient evaluated bedside, reports she feels much better, good po intake throughout the day.  She does not report any fever, chills, dizziness, weakness, HA, Changes in vision, CP, palpitations, SOB, cough, N/V/D/C, dysuria, changes in bowel movements, LE edema. ROS otherwise negative.    VITAL SIGNS:  T(F): 97.1 (12-15-23 @ 13:21)  HR: 74 (12-15-23 @ 13:21)  BP: 110/74 (12-15-23 @ 13:21)  RR: 18 (12-15-23 @ 13:21)  SpO2: 98% (12-15-23 @ 13:21)  Wt(kg): --    PHYSICAL EXAM:  Constitutional: resting comfortably, no acute distress  HEENT: PERRL, EOMI, sclera non-icteric, neck supple, trachea midline, no masses, no JVD, MMM, good dentition  Respiratory: CTA b/l, good air entry b/l, no wheezing, no rhonchi, no rales, without accessory muscle use and no intercostal retractions  Cardiovascular: RRR, normal S1S2, no M/R/G  Gastrointestinal: soft, NTND, no masses palpable, BS normal  Extremities: Warm, well perfused, pulses equal bilateral upper and lower extremities, no edema, no clubbing  Neurological: AAOx3, CN Grossly intact  Skin: Normal temperature, warm, dry    MEDICATIONS  (STANDING):  chlorhexidine 2% Cloths 1 Application(s) Topical <User Schedule>  cholecalciferol 1000 Unit(s) Oral every 24 hours  dextrose 5%. 1000 milliLiter(s) (50 mL/Hr) IV Continuous <Continuous>  dextrose 5%. 1000 milliLiter(s) (100 mL/Hr) IV Continuous <Continuous>  dextrose 50% Injectable 25 Gram(s) IV Push once  dextrose 50% Injectable 12.5 Gram(s) IV Push once  dextrose 50% Injectable 25 Gram(s) IV Push once  enoxaparin Injectable 40 milliGRAM(s) SubCutaneous every 24 hours  escitalopram 10 milliGRAM(s) Oral daily  glucagon  Injectable 1 milliGRAM(s) IntraMuscular once  insulin lispro (ADMELOG) corrective regimen sliding scale   SubCutaneous three times a day before meals  insulin lispro Injectable (ADMELOG) 5 Unit(s) SubCutaneous three times a day before meals  insulin regular  human corrective regimen sliding scale   SubCutaneous at bedtime  potassium chloride   Powder 40 milliEquivalent(s) Oral once  potassium chloride  10 mEq/100 mL IVPB 10 milliEquivalent(s) IV Intermittent every 1 hour  potassium phosphate / sodium phosphate Powder (PHOS-NaK) 1 Packet(s) Oral every 2 hours  sodium chloride 0.45% 1000 milliLiter(s) (500 mL/Hr) IV Continuous <Continuous>    MEDICATIONS  (PRN):  dextrose Oral Gel 15 Gram(s) Oral once PRN Blood Glucose LESS THAN 70 milliGRAM(s)/deciliter  ondansetron Injectable 4 milliGRAM(s) IV Push every 8 hours PRN Nausea and/or Vomiting  Allergies    No Known Allergies    Intolerances    LABS:                        9.8    4.23  )-----------( 175      ( 15 Dec 2023 04:52 )             26.5     12-15    133<L>  |  102  |  14  ----------------------------<  336<H>  3.3<L>   |  10<LL>  |  0.74    Ca    8.6      15 Dec 2023 19:37  Phos  2.1     12-15  Mg     1.9     12-15    TPro  5.0<L>  /  Alb  2.9<L>  /  TBili  0.2  /  DBili  x   /  AST  11  /  ALT  6<L>  /  AlkPhos  103  12-15    PT/INR - ( 14 Dec 2023 03:26 )   PT: 8.9 sec;   INR: 0.77        PTT - ( 14 Dec 2023 03:26 )  PTT:27.1 sec  Urinalysis Basic - ( 15 Dec 2023 19:37 )    Color: x / Appearance: x / SG: x / pH: x  Gluc: 336 mg/dL / Ketone: x  / Bili: x / Urobili: x   Blood: x / Protein: x / Nitrite: x   Leuk Esterase: x / RBC: x / WBC x   Sq Epi: x / Non Sq Epi: x / Bacteria: x    RADIOLOGY & ADDITIONAL TESTS:  Reviewed

## 2023-12-15 NOTE — PROGRESS NOTE ADULT - TIME BILLING
Insulin adjustment, evaluation of hypocalcemia
Review of hospital course, labs, vitals, medical records.   Bedside exam and interview    Discussed plan of care with housestaff  Documenting the encounter

## 2023-12-15 NOTE — PROGRESS NOTE ADULT - PROBLEM SELECTOR PLAN 6
F: s/p dextrose 5% NS  E: replete K<4, Mg<2  N: consistent carbs   DVT ppx: Lovenox 40mg @24h  FULL CODE  Dispo: [facility] F: s/p dextrose 5% NS  E: replete K<4, Mg<2  N: consistent carbs   DVT ppx: Lovenox 40mg @24h  FULL CODE  Dispo: [facility]

## 2023-12-15 NOTE — PROGRESS NOTE ADULT - ASSESSMENT
24 y/o female with history of IDDM, depression, anxiety, presented with nausea and vomiting found to have +RSV and DKA and transferred to [facility] MICU for further management. 22 y/o female with history of IDDM, depression, anxiety, presented with nausea and vomiting found to have +RSV and DKA and transferred to [facility] MICU for further management.

## 2023-12-15 NOTE — PROGRESS NOTE ADULT - ASSESSMENT
22 y/o female with history of IDDM, depression, anxiety, presented with nausea and vomiting found to have +RSV and DKA and transferred to [facility] MICU for further management.     NEURO  #Anxiety  #Depression   F/u home dose of Lexapro in AM.   - restart as tolerated    #AMS (RESLOVED)  Patient is lethargic on exam likely 2/2 to elevated blood glucose and dehydration.   - Continue to treat DKA as stated below     CARDIAC  JONI       PULM  Saturating well on RA      GI  NPO      RENAL   #Hypokalemia   #Anion gap metabolic acidosis (RESOLVED)  Likely 2/2 DKA   - Treat as stated below  - Replete electrolytes       ENDO  #DKA  #Type 1 DM  Pt has hx of Type I DM, diagnosed January 2022 at [facility]. On admission, Glucose >400, Ag 27, pH 6.93. Likely 2/2 RSV infection.  States she takes Triseba at home, but unable to state dose at this time d/t mental status. Denies nausea, vomiting, polyuria and polydipsia.   S/p 50meq NaHCO3, insulin drip, 3L NS bolus, d5 with NaHCO3 in the ED  - Her anion gap closed today. Will continue on the sliding scale while inpatient. Discharge on an insulin regimen.  - Replete electrolytes as required    Endo recs :   Poorly controlled Type 1 DM  - Patient seems to be very sensitive to low doses of insulin, making long term control of her diabetes very difficulty with subcutaneous injections. She ideally should be on a pump long term  - Patient can follow up at discharge with [facility] by calling [phone] to make an appointment.       ID  #RSV  Endorsing URI symptoms, found to be RSV+.   - Supportive measures       F: dextrose 5% NS  E: replete K<4, Mg<2  N: consistent carbs   DVT ppx: Lovenox 40mg @24h  FULL CODE  Dispo: MICU   24 y/o female with history of IDDM, depression, anxiety, presented with nausea and vomiting found to have +RSV and DKA and transferred to [facility] MICU for further management.     NEURO  #Anxiety  #Depression   F/u home dose of Lexapro in AM.   - restart as tolerated    #AMS (RESLOVED)  Patient is lethargic on exam likely 2/2 to elevated blood glucose and dehydration.   - Continue to treat DKA as stated below     CARDIAC  JONI       PULM  Saturating well on RA      GI  NPO      RENAL   #Hypokalemia   #Anion gap metabolic acidosis (RESOLVED)  Likely 2/2 DKA   - Treat as stated below  - Replete electrolytes       ENDO  #DKA  #Type 1 DM  Pt has hx of Type I DM, diagnosed January 2022 at [facility]. On admission, Glucose >400, Ag 27, pH 6.93. Likely 2/2 RSV infection.  States she takes Triseba at home, but unable to state dose at this time d/t mental status. Denies nausea, vomiting, polyuria and polydipsia.   S/p 50meq NaHCO3, insulin drip, 3L NS bolus, d5 with NaHCO3 in the ED  - Her anion gap closed today. Will continue on the sliding scale while inpatient. Discharge on an insulin regimen.  - Replete electrolytes as required    Endo recs :   Poorly controlled Type 1 DM  - Patient seems to be very sensitive to low doses of insulin, making long term control of her diabetes very difficulty with subcutaneous injections. She ideally should be on a pump long term  - Patient can follow up at discharge with [facility] by calling [phone] to make an appointment.       ID  #RSV  Endorsing URI symptoms, found to be RSV+.   - Supportive measures       F: dextrose 5% NS  E: replete K<4, Mg<2  N: consistent carbs   DVT ppx: Lovenox 40mg @24h  FULL CODE  Dispo: MICU

## 2023-12-15 NOTE — CONSULT NOTE ADULT - ATTENDING COMMENTS
This patient was admitted to MICU for DKA with electrolyte imbalance, during the day she was rounded on and transferred to the [facility] where her became elevated.  She was given IVF and NPH and transferred to the [facility] for closer monitoring and management, her AG normalized.  Her glucose was still elevated so she was placed on a higher level sliding scale and the plan is for endocrinology to place her on a insulin regiment. This patient was admitted to MICU for DKA with electrolyte imbalance, during the day she was rounded on and transferred to the [facility] where her became elevated.  She was given IVF and NPH and transferred to the [facility] for closer monitoring and management, her AG normalized.  Her glucose was still elevated so she was placed on a higher level sliding scale and the plan is for endocrinology to place her on a insulin regiment.

## 2023-12-15 NOTE — CONSULT NOTE ADULT - ASSESSMENT
24 y/o female with history of IDDM, depression, anxiety, presented with nausea and vomiting found to have +RSV and DKA.    # DKA   Pt has hx of Type I DM, diagnosed January 2022 at [facility] when she presented with DKA. Currently her 2nd admission for DKA, she was admitted 12/14, on admission, Glucose >400, Ag 27, pH 6.93, precipitant was likely RSV infection. She was on the insulin drip, subsequently anion gap closed x 2 and she was transitioned to Lantus.  FSG remained elevated throughout the day and repeat BMP showed CO2 10 and anion gap 21.           22 y/o female with history of IDDM, depression, anxiety, presented with nausea and vomiting found to have +RSV and DKA.    # DKA   Pt has hx of Type I DM, diagnosed January 2022 at [facility] when she presented with DKA. Currently her 2nd admission for DKA, she was admitted 12/14, on admission, Glucose >400, Ag 27, pH 6.93, precipitant was likely RSV infection. She was on the insulin drip, subsequently anion gap closed x 2 and she was transitioned to Lantus.  FSG remained elevated throughout the day and repeat BMP showed CO2 10 and anion gap 21.

## 2023-12-15 NOTE — PROGRESS NOTE ADULT - SUBJECTIVE AND OBJECTIVE BOX
***TRANSFER FROM [facility] TO MICU*****  Hospital Course:  22 y/o female with history of IDDM, depression, anxiety, presented with nausea and vomiting found to have +RSV and DKA (glucose on admission 470, urine ketones 160, urine glucose 1000)  and transferred to [facility] MICU for further management. In MICU, she was put on insulin drips, K was continuously repleted, recieved bicarbonate. Her anion gap closed the morning of 12/15, she was bridged with 8 units of Lantus.  Subsequently remained stable and was stepped down to regional floors.  Repeat labs 12/15 evening with Bicarb 10 and anion gap 21, ICU consulted.  Patient evaluated bedside, reports she feels much better, good po intake throughout the day.  She does not report any fever, chills, dizziness, weakness, HA, Changes in vision, CP, palpitations, SOB, cough, N/V/D/C, dysuria, changes in bowel movements, LE edema. ROS otherwise negative.    INTERVAL HPI/OVERNIGHT EVENTS:  Patient was seen and examined at bedside. As per nurse and patient, no o/n events, patient resting comfortably. No complaints at this time. Patient denies: fever, chills, lightheadedness, weakness, CP, palpitations, SOB, cough, N/V. ROS otherwise negative.    VITAL SIGNS:  T(F): 97.1 (12-15-23 @ 13:21)  HR: 74 (12-15-23 @ 13:21)  BP: 110/74 (12-15-23 @ 13:21)  RR: 18 (12-15-23 @ 13:21)  SpO2: 98% (12-15-23 @ 13:21)  Wt(kg): --      12-14-23 @ 07:01  -  12-15-23 @ 07:00  --------------------------------------------------------  IN: 5087 mL / OUT: 4850 mL / NET: 237 mL    12-15-23 @ 07:01  -  12-15-23 @ 22:49  --------------------------------------------------------  IN: 249.9 mL / OUT: 1000 mL / NET: -750.1 mL        PHYSICAL EXAM:     Constitutional: resting comfortably, no acute distress  HEENT: PERRL, EOMI, sclera non-icteric, neck supple, trachea midline, no masses, no JVD, MMM, good dentition  Respiratory: CTA b/l, good air entry b/l, no wheezing, no rhonchi, no rales, without accessory muscle use and no intercostal retractions  Cardiovascular: RRR, normal S1S2, no M/R/G  Gastrointestinal: soft, NTND, no masses palpable, BS normal  Extremities: Warm, well perfused, pulses equal bilateral upper and lower extremities, no edema, no clubbing  Neurological: AAOx3, CN Grossly intact  Skin: Normal temperature, warm, dry    MEDICATIONS  (STANDING):  chlorhexidine 2% Cloths 1 Application(s) Topical <User Schedule>  cholecalciferol 1000 Unit(s) Oral every 24 hours  dextrose 5%. 1000 milliLiter(s) (50 mL/Hr) IV Continuous <Continuous>  dextrose 5%. 1000 milliLiter(s) (100 mL/Hr) IV Continuous <Continuous>  dextrose 50% Injectable 25 Gram(s) IV Push once  dextrose 50% Injectable 25 Gram(s) IV Push once  dextrose 50% Injectable 12.5 Gram(s) IV Push once  enoxaparin Injectable 40 milliGRAM(s) SubCutaneous every 24 hours  escitalopram 10 milliGRAM(s) Oral daily  glucagon  Injectable 1 milliGRAM(s) IntraMuscular once  insulin lispro (ADMELOG) corrective regimen sliding scale   SubCutaneous three times a day before meals  insulin lispro Injectable (ADMELOG) 5 Unit(s) SubCutaneous three times a day before meals  insulin regular  human corrective regimen sliding scale   SubCutaneous at bedtime  potassium chloride  10 mEq/100 mL IVPB 10 milliEquivalent(s) IV Intermittent every 1 hour  potassium phosphate / sodium phosphate Powder (PHOS-NaK) 1 Packet(s) Oral every 2 hours  sodium chloride 0.45% 1000 milliLiter(s) (500 mL/Hr) IV Continuous <Continuous>    MEDICATIONS  (PRN):  dextrose Oral Gel 15 Gram(s) Oral once PRN Blood Glucose LESS THAN 70 milliGRAM(s)/deciliter  ondansetron Injectable 4 milliGRAM(s) IV Push every 8 hours PRN Nausea and/or Vomiting      Allergies    No Known Allergies    Intolerances        LABS:                        9.8    4.23  )-----------( 175      ( 15 Dec 2023 04:52 )             26.5     12-15    130<L>  |  102  |  11  ----------------------------<  337<H>  3.3<L>   |  14<L>  |  0.78    Ca    8.2<L>      15 Dec 2023 21:41  Phos  1.9     12-15  Mg     1.8     12-15    TPro  5.6<L>  /  Alb  3.1<L>  /  TBili  0.3  /  DBili  x   /  AST  16  /  ALT  8<L>  /  AlkPhos  139<H>  12-15    PT/INR - ( 14 Dec 2023 03:26 )   PT: 8.9 sec;   INR: 0.77          PTT - ( 14 Dec 2023 03:26 )  PTT:27.1 sec  Urinalysis Basic - ( 15 Dec 2023 21:41 )    Color: x / Appearance: x / SG: x / pH: x  Gluc: 337 mg/dL / Ketone: x  / Bili: x / Urobili: x   Blood: x / Protein: x / Nitrite: x   Leuk Esterase: x / RBC: x / WBC x   Sq Epi: x / Non Sq Epi: x / Bacteria: x        RADIOLOGY & ADDITIONAL TESTS:  Reviewed ***TRANSFER FROM [facility] TO MICU*****  Hospital Course:  24 y/o female with history of IDDM, depression, anxiety, presented with nausea and vomiting found to have +RSV and DKA (glucose on admission 470, urine ketones 160, urine glucose 1000)  and transferred to [facility] MICU for further management. In MICU, she was put on insulin drips, K was continuously repleted, recieved bicarbonate. Her anion gap closed the morning of 12/15, she was bridged with 8 units of Lantus.  Subsequently remained stable and was stepped down to regional floors.  Repeat labs 12/15 evening with Bicarb 10 and anion gap 21, ICU consulted.  Patient evaluated bedside, reports she feels much better, good po intake throughout the day.  She does not report any fever, chills, dizziness, weakness, HA, Changes in vision, CP, palpitations, SOB, cough, N/V/D/C, dysuria, changes in bowel movements, LE edema. ROS otherwise negative.    INTERVAL HPI/OVERNIGHT EVENTS:  Patient was seen and examined at bedside. As per nurse and patient, no o/n events, patient resting comfortably. No complaints at this time. Patient denies: fever, chills, lightheadedness, weakness, CP, palpitations, SOB, cough, N/V. ROS otherwise negative.    VITAL SIGNS:  T(F): 97.1 (12-15-23 @ 13:21)  HR: 74 (12-15-23 @ 13:21)  BP: 110/74 (12-15-23 @ 13:21)  RR: 18 (12-15-23 @ 13:21)  SpO2: 98% (12-15-23 @ 13:21)  Wt(kg): --      12-14-23 @ 07:01  -  12-15-23 @ 07:00  --------------------------------------------------------  IN: 5087 mL / OUT: 4850 mL / NET: 237 mL    12-15-23 @ 07:01  -  12-15-23 @ 22:49  --------------------------------------------------------  IN: 249.9 mL / OUT: 1000 mL / NET: -750.1 mL        PHYSICAL EXAM:     Constitutional: resting comfortably, no acute distress  HEENT: PERRL, EOMI, sclera non-icteric, neck supple, trachea midline, no masses, no JVD, MMM, good dentition  Respiratory: CTA b/l, good air entry b/l, no wheezing, no rhonchi, no rales, without accessory muscle use and no intercostal retractions  Cardiovascular: RRR, normal S1S2, no M/R/G  Gastrointestinal: soft, NTND, no masses palpable, BS normal  Extremities: Warm, well perfused, pulses equal bilateral upper and lower extremities, no edema, no clubbing  Neurological: AAOx3, CN Grossly intact  Skin: Normal temperature, warm, dry    MEDICATIONS  (STANDING):  chlorhexidine 2% Cloths 1 Application(s) Topical <User Schedule>  cholecalciferol 1000 Unit(s) Oral every 24 hours  dextrose 5%. 1000 milliLiter(s) (50 mL/Hr) IV Continuous <Continuous>  dextrose 5%. 1000 milliLiter(s) (100 mL/Hr) IV Continuous <Continuous>  dextrose 50% Injectable 25 Gram(s) IV Push once  dextrose 50% Injectable 25 Gram(s) IV Push once  dextrose 50% Injectable 12.5 Gram(s) IV Push once  enoxaparin Injectable 40 milliGRAM(s) SubCutaneous every 24 hours  escitalopram 10 milliGRAM(s) Oral daily  glucagon  Injectable 1 milliGRAM(s) IntraMuscular once  insulin lispro (ADMELOG) corrective regimen sliding scale   SubCutaneous three times a day before meals  insulin lispro Injectable (ADMELOG) 5 Unit(s) SubCutaneous three times a day before meals  insulin regular  human corrective regimen sliding scale   SubCutaneous at bedtime  potassium chloride  10 mEq/100 mL IVPB 10 milliEquivalent(s) IV Intermittent every 1 hour  potassium phosphate / sodium phosphate Powder (PHOS-NaK) 1 Packet(s) Oral every 2 hours  sodium chloride 0.45% 1000 milliLiter(s) (500 mL/Hr) IV Continuous <Continuous>    MEDICATIONS  (PRN):  dextrose Oral Gel 15 Gram(s) Oral once PRN Blood Glucose LESS THAN 70 milliGRAM(s)/deciliter  ondansetron Injectable 4 milliGRAM(s) IV Push every 8 hours PRN Nausea and/or Vomiting      Allergies    No Known Allergies    Intolerances        LABS:                        9.8    4.23  )-----------( 175      ( 15 Dec 2023 04:52 )             26.5     12-15    130<L>  |  102  |  11  ----------------------------<  337<H>  3.3<L>   |  14<L>  |  0.78    Ca    8.2<L>      15 Dec 2023 21:41  Phos  1.9     12-15  Mg     1.8     12-15    TPro  5.6<L>  /  Alb  3.1<L>  /  TBili  0.3  /  DBili  x   /  AST  16  /  ALT  8<L>  /  AlkPhos  139<H>  12-15    PT/INR - ( 14 Dec 2023 03:26 )   PT: 8.9 sec;   INR: 0.77          PTT - ( 14 Dec 2023 03:26 )  PTT:27.1 sec  Urinalysis Basic - ( 15 Dec 2023 21:41 )    Color: x / Appearance: x / SG: x / pH: x  Gluc: 337 mg/dL / Ketone: x  / Bili: x / Urobili: x   Blood: x / Protein: x / Nitrite: x   Leuk Esterase: x / RBC: x / WBC x   Sq Epi: x / Non Sq Epi: x / Bacteria: x        RADIOLOGY & ADDITIONAL TESTS:  Reviewed ***TRANSFER FROM [facility] TO [facility]*****  Hospital Course:  22 y/o female with history of IDDM, depression, anxiety, presented with nausea and vomiting found to have +RSV and DKA (glucose on admission 470, urine ketones 160, urine glucose 1000)  and transferred to [facility] MICU for further management. In MICU, she was put on insulin drips, K was continuously repleted, recieved bicarbonate. Her anion gap closed the morning of 12/15. She was stepped down to [facility] on 12/5 but repeat labs showed her anion gap opened to 21, glucose 292, CO2 10. ICU was consulted.     INTERVAL HPI/OVERNIGHT EVENTS:  Patient was seen and examined at bedside. As per nurse and patient, no o/n events, patient resting comfortably. No complaints at this time. Patient denies: fever, chills, lightheadedness, weakness, CP, palpitations, SOB, cough, N/V. ROS otherwise negative.    VITAL SIGNS:  T(F): 97.1 (12-15-23 @ 13:21)  HR: 74 (12-15-23 @ 13:21)  BP: 110/74 (12-15-23 @ 13:21)  RR: 18 (12-15-23 @ 13:21)  SpO2: 98% (12-15-23 @ 13:21)  Wt(kg): --      12-14-23 @ 07:01  -  12-15-23 @ 07:00  --------------------------------------------------------  IN: 5087 mL / OUT: 4850 mL / NET: 237 mL    12-15-23 @ 07:01  -  12-15-23 @ 22:49  --------------------------------------------------------  IN: 249.9 mL / OUT: 1000 mL / NET: -750.1 mL        PHYSICAL EXAM:     Constitutional: resting comfortably, no acute distress  HEENT: PERRL, EOMI, sclera non-icteric, neck supple, trachea midline, no masses, no JVD, MMM, good dentition  Respiratory: CTA b/l, good air entry b/l, no wheezing, no rhonchi, no rales, without accessory muscle use and no intercostal retractions  Cardiovascular: RRR, normal S1S2, no M/R/G  Gastrointestinal: soft, NTND, no masses palpable, BS normal  Extremities: Warm, well perfused, pulses equal bilateral upper and lower extremities, no edema, no clubbing  Neurological: AAOx3, CN Grossly intact  Skin: Normal temperature, warm, dry    MEDICATIONS  (STANDING):  chlorhexidine 2% Cloths 1 Application(s) Topical <User Schedule>  cholecalciferol 1000 Unit(s) Oral every 24 hours  dextrose 5%. 1000 milliLiter(s) (50 mL/Hr) IV Continuous <Continuous>  dextrose 5%. 1000 milliLiter(s) (100 mL/Hr) IV Continuous <Continuous>  dextrose 50% Injectable 25 Gram(s) IV Push once  dextrose 50% Injectable 25 Gram(s) IV Push once  dextrose 50% Injectable 12.5 Gram(s) IV Push once  enoxaparin Injectable 40 milliGRAM(s) SubCutaneous every 24 hours  escitalopram 10 milliGRAM(s) Oral daily  glucagon  Injectable 1 milliGRAM(s) IntraMuscular once  insulin lispro (ADMELOG) corrective regimen sliding scale   SubCutaneous three times a day before meals  insulin lispro Injectable (ADMELOG) 5 Unit(s) SubCutaneous three times a day before meals  insulin regular  human corrective regimen sliding scale   SubCutaneous at bedtime  potassium chloride  10 mEq/100 mL IVPB 10 milliEquivalent(s) IV Intermittent every 1 hour  potassium phosphate / sodium phosphate Powder (PHOS-NaK) 1 Packet(s) Oral every 2 hours  sodium chloride 0.45% 1000 milliLiter(s) (500 mL/Hr) IV Continuous <Continuous>    MEDICATIONS  (PRN):  dextrose Oral Gel 15 Gram(s) Oral once PRN Blood Glucose LESS THAN 70 milliGRAM(s)/deciliter  ondansetron Injectable 4 milliGRAM(s) IV Push every 8 hours PRN Nausea and/or Vomiting      Allergies    No Known Allergies    Intolerances        LABS:                        9.8    4.23  )-----------( 175      ( 15 Dec 2023 04:52 )             26.5     12-15    130<L>  |  102  |  11  ----------------------------<  337<H>  3.3<L>   |  14<L>  |  0.78    Ca    8.2<L>      15 Dec 2023 21:41  Phos  1.9     12-15  Mg     1.8     12-15    TPro  5.6<L>  /  Alb  3.1<L>  /  TBili  0.3  /  DBili  x   /  AST  16  /  ALT  8<L>  /  AlkPhos  139<H>  12-15    PT/INR - ( 14 Dec 2023 03:26 )   PT: 8.9 sec;   INR: 0.77          PTT - ( 14 Dec 2023 03:26 )  PTT:27.1 sec  Urinalysis Basic - ( 15 Dec 2023 21:41 )    Color: x / Appearance: x / SG: x / pH: x  Gluc: 337 mg/dL / Ketone: x  / Bili: x / Urobili: x   Blood: x / Protein: x / Nitrite: x   Leuk Esterase: x / RBC: x / WBC x   Sq Epi: x / Non Sq Epi: x / Bacteria: x        RADIOLOGY & ADDITIONAL TESTS:  Reviewed ***TRANSFER FROM [facility] TO [facility]*****  Hospital Course:  22 y/o female with history of IDDM, depression, anxiety, presented with nausea and vomiting found to have +RSV and DKA (glucose on admission 470, urine ketones 160, urine glucose 1000)  and transferred to [facility] MICU for further management. In MICU, she was put on insulin drips, K was continuously repleted, recieved bicarbonate. Her anion gap closed the morning of 12/15. She was stepped down to [facility] on 12/5 but repeat labs showed her anion gap opened to 21, glucose 292, CO2 10. ICU was consulted.     INTERVAL HPI/OVERNIGHT EVENTS:  Patient was seen and examined at bedside. As per nurse and patient, no o/n events, patient resting comfortably. No complaints at this time. Patient denies: fever, chills, lightheadedness, weakness, CP, palpitations, SOB, cough, N/V. ROS otherwise negative.    VITAL SIGNS:  T(F): 97.1 (12-15-23 @ 13:21)  HR: 74 (12-15-23 @ 13:21)  BP: 110/74 (12-15-23 @ 13:21)  RR: 18 (12-15-23 @ 13:21)  SpO2: 98% (12-15-23 @ 13:21)  Wt(kg): --      12-14-23 @ 07:01  -  12-15-23 @ 07:00  --------------------------------------------------------  IN: 5087 mL / OUT: 4850 mL / NET: 237 mL    12-15-23 @ 07:01  -  12-15-23 @ 22:49  --------------------------------------------------------  IN: 249.9 mL / OUT: 1000 mL / NET: -750.1 mL        PHYSICAL EXAM:     Constitutional: resting comfortably, no acute distress  HEENT: PERRL, EOMI, sclera non-icteric, neck supple, trachea midline, no masses, no JVD, MMM, good dentition  Respiratory: CTA b/l, good air entry b/l, no wheezing, no rhonchi, no rales, without accessory muscle use and no intercostal retractions  Cardiovascular: RRR, normal S1S2, no M/R/G  Gastrointestinal: soft, NTND, no masses palpable, BS normal  Extremities: Warm, well perfused, pulses equal bilateral upper and lower extremities, no edema, no clubbing  Neurological: AAOx3, CN Grossly intact  Skin: Normal temperature, warm, dry    MEDICATIONS  (STANDING):  chlorhexidine 2% Cloths 1 Application(s) Topical <User Schedule>  cholecalciferol 1000 Unit(s) Oral every 24 hours  dextrose 5%. 1000 milliLiter(s) (50 mL/Hr) IV Continuous <Continuous>  dextrose 5%. 1000 milliLiter(s) (100 mL/Hr) IV Continuous <Continuous>  dextrose 50% Injectable 25 Gram(s) IV Push once  dextrose 50% Injectable 25 Gram(s) IV Push once  dextrose 50% Injectable 12.5 Gram(s) IV Push once  enoxaparin Injectable 40 milliGRAM(s) SubCutaneous every 24 hours  escitalopram 10 milliGRAM(s) Oral daily  glucagon  Injectable 1 milliGRAM(s) IntraMuscular once  insulin lispro (ADMELOG) corrective regimen sliding scale   SubCutaneous three times a day before meals  insulin lispro Injectable (ADMELOG) 5 Unit(s) SubCutaneous three times a day before meals  insulin regular  human corrective regimen sliding scale   SubCutaneous at bedtime  potassium chloride  10 mEq/100 mL IVPB 10 milliEquivalent(s) IV Intermittent every 1 hour  potassium phosphate / sodium phosphate Powder (PHOS-NaK) 1 Packet(s) Oral every 2 hours  sodium chloride 0.45% 1000 milliLiter(s) (500 mL/Hr) IV Continuous <Continuous>    MEDICATIONS  (PRN):  dextrose Oral Gel 15 Gram(s) Oral once PRN Blood Glucose LESS THAN 70 milliGRAM(s)/deciliter  ondansetron Injectable 4 milliGRAM(s) IV Push every 8 hours PRN Nausea and/or Vomiting      Allergies    No Known Allergies    Intolerances        LABS:                        9.8    4.23  )-----------( 175      ( 15 Dec 2023 04:52 )             26.5     12-15    130<L>  |  102  |  11  ----------------------------<  337<H>  3.3<L>   |  14<L>  |  0.78    Ca    8.2<L>      15 Dec 2023 21:41  Phos  1.9     12-15  Mg     1.8     12-15    TPro  5.6<L>  /  Alb  3.1<L>  /  TBili  0.3  /  DBili  x   /  AST  16  /  ALT  8<L>  /  AlkPhos  139<H>  12-15    PT/INR - ( 14 Dec 2023 03:26 )   PT: 8.9 sec;   INR: 0.77          PTT - ( 14 Dec 2023 03:26 )  PTT:27.1 sec  Urinalysis Basic - ( 15 Dec 2023 21:41 )    Color: x / Appearance: x / SG: x / pH: x  Gluc: 337 mg/dL / Ketone: x  / Bili: x / Urobili: x   Blood: x / Protein: x / Nitrite: x   Leuk Esterase: x / RBC: x / WBC x   Sq Epi: x / Non Sq Epi: x / Bacteria: x        RADIOLOGY & ADDITIONAL TESTS:  Reviewed

## 2023-12-15 NOTE — PROGRESS NOTE ADULT - PROBLEM SELECTOR PLAN 6
F: s/p dextrose 5% NS  E: replete K<4, Mg<2  N: consistent carbs   DVT ppx: Lovenox 40mg @24h  FULL CODE  Dispo: [facility] F: s/p dextrose 5% NS  E: replete K<4, Mg<2  N: consistent carbs   DVT ppx: Lovenox 40mg @24h  FULL CODE  Dispo: [facility]

## 2023-12-15 NOTE — PROGRESS NOTE ADULT - PROBLEM SELECTOR PLAN 6
F: s/p dextrose 5% NS  E: replete K<4, Mg<2  N: consistent carbs   DVT ppx: Lovenox 40mg @24h  FULL CODE  Dispo: MICU F: s/p dextrose 5% NS  E: replete K<4, Mg<2  N: consistent carbs   DVT ppx: Lovenox 40mg @24h  FULL CODE  Dispo: [facility] F: s/p dextrose 5% NS  E: replete K<4, Mg<2  N: consistent carbs   DVT ppx: Lovenox 40mg @24h  FULL CODE  Dispo: [facility]

## 2023-12-15 NOTE — PROGRESS NOTE ADULT - PROBLEM SELECTOR PLAN 1
Pt has hx of Type I DM, diagnosed January 2022 at [facility] when she presented with DKA. Currently her 2nd admission for DKA, she was admitted 12/14, on admission, Glucose >400, Ag 27, pH 6.93, precipitant was likely RSV infection. She was on the insulin drip, subsequently anion gap closed x 2 and she was transitioned to Lantus.  FSG remained elevated throughout the day and repeat BMP showed CO2 10 and anion gap 21.      -s/p NPH 10u w/ AG closure  -s/p 40meq KCl x2 PO  -c w/ Kphos powder x2 q2  -c w/ KCl 10meq x3 IV  -q2 fingersticks  -repeat BMP 2am & AM Pt has hx of Type I DM, diagnosed January 2022 at [facility] when she presented with DKA. Currently her 2nd admission for DKA, she was admitted 12/14, on admission, Glucose >400, Ag 27, pH 6.93, precipitant was likely RSV infection. She was on the insulin drip, subsequently anion gap closed x 2 and she was transitioned to Lantus.  FSG remained elevated throughout the day and repeat BMP showed CO2 10 and anion gap 21.      -s/p NPH 10u w/ AG closure  -s/p 40meq KCl x2 PO  -c w/ Kphos powder x2 q2  -c w/ KCl 10meq x3 IV  -q2 fingersticks  -repeat BMP 2am & AM Pt has hx of Type I DM, diagnosed January 2022 at [facility] when she presented with DKA. Currently her 2nd admission for DKA, she was admitted 12/14, on admission, Glucose >400, Ag 27, pH 6.93, precipitant was likely RSV infection. She was on the insulin drip, subsequently anion gap closed x 2 and she was transitioned to Lantus.  FSG remained elevated throughout the day and repeat BMP showed CO2 10 and anion gap 21.      -s/p NPH 10u w/ AG closure  -s/p 40meq KCl x2 PO  -c w/ Kphos powder x2 q2  -c w/ KCl 10meq x3 IV  -q2 fingersticks  -repeat BMP 2am & w/ AM labs Pt has hx of Type I DM, diagnosed January 2022 at [facility] when she presented with DKA. Currently her 2nd admission for DKA, she was admitted 12/14, on admission, Glucose >400, Ag 27, pH 6.93, precipitant was likely RSV infection. She was on the insulin drip, subsequently anion gap closed x 2 and she was transitioned to Lantus.  FSG remained elevated throughout the day and repeat BMP showed CO2 10 and anion gap 21.      -s/p NPH 10u w/ AG closure  -s/p 40meq KCl x2 PO  -c w/ Kphos powder x2 q2  -c w/ KCl 10meq x3 IV  -q2 fingersticks  -repeat BMP 2am & w/ AM labs

## 2023-12-15 NOTE — PROGRESS NOTE ADULT - PROBLEM SELECTOR PLAN 4
F: dextrose 5% NS  E: replete K<4, Mg<2  N: consistent carbs   DVT ppx: Lovenox 40mg @24h  FULL CODE  Dispo: MICU Hgb 9.8 MCV 76.4. Mentzer index 22.    - f/u iron studies Hgb 9.8 MCV 76.4. [place]zer index 22.    - f/u iron studies  - transfuse if hb<7  - maintain active t/s

## 2023-12-15 NOTE — PROGRESS NOTE ADULT - PROBLEM SELECTOR PLAN 1
Pt has hx of Type I DM, diagnosed January 2022 at [facility]. On admission, Glucose >400, Ag 27, pH 6.93. Likely 2/2 RSV infection.  States she takes Triseba at home, but unable to state dose at this time d/t mental status. Denies nausea, vomiting, polyuria and polydipsia.   S/p 50meq NaHCO3, insulin drip, 3L NS bolus, d5 with NaHCO3 in the ED  - Her anion gap closed today. Will continue on the sliding scale while inpatient. Discharge on an insulin regimen.  - Replete electrolytes as required    Endo recs :   Poorly controlled Type 1 DM  - Patient seems to be very sensitive to low doses of insulin, making long term control of her diabetes very difficulty with subcutaneous injections. She ideally should be on a pump long term  - Patient can follow up at discharge with [facility] by calling [phone] to make an appointment. Pt has hx of Type I DM, diagnosed January 2022 at [facility]. On admission, Glucose >400, Ag 27, pH 6.93. Likely 2/2 RSV infection.  States she takes Triseba at home, but unable to state dose at this time d/t mental status. Denies nausea, vomiting, polyuria and polydipsia.   S/p 50meq NaHCO3, insulin drip, 3L NS bolus, d5 with NaHCO3 in the ED  - Her anion gap closed today. Will continue on the sliding scale while inpatient. Discharge on an insulin regimen.  - Replete electrolytes as required    Endo recs :   Poorly controlled Type 1 DM  - Patient seems to be very sensitive to low doses of insulin, making long term control of her diabetes very difficulty with subcutaneous injections. She ideally should be on a pump long term  - Patient can follow up at discharge with [facility] by calling [phone] to make an appointment. Pt has hx of Type I DM, diagnosed January 2022 at [facility]. On admission, Glucose >400, Ag 27, pH 6.93. Likely 2/2 RSV infection.  States she takes Triseba at home, but unable to state dose at this time d/t mental status. Denies nausea, vomiting, polyuria and polydipsia.   S/p 50meq NaHCO3, insulin drip, 3L NS bolus, d5 with NaHCO3 in the ED. Anion gap closed today.     - Replete electrolytes as required  - cISS  - lispro 4u    Endo recs :   Poorly controlled Type 1 DM  - Patient seems to be very sensitive to low doses of insulin, making long term control of her diabetes very difficulty with subcutaneous injections. She ideally should be on a pump long term  - Patient can follow up at discharge with [facility] by calling [phone] to make an appointment. Pt has hx of Type I DM, diagnosed January 2022 at [facility]. On admission, Glucose >400, Ag 27, pH 6.93. Likely 2/2 RSV infection.  States she takes Triseba at home, but unable to state dose at this time d/t mental status. Denies nausea, vomiting, polyuria and polydipsia.   S/p 50meq NaHCO3, insulin drip, 3L NS bolus, d5 with NaHCO3 in the ED. Anion gap closed today.     - Replete electrolytes as required  - cISS  - lispro 4u    Endo recs :   Poorly controlled Type 1 DM  - Patient seems to be very sensitive to low doses of insulin, making long term control of her diabetes very difficulty with subcutaneous injections. She ideally should be on a pump long term  - Patient can follow up at discharge with [facility] by calling [phone] to make an appointment.

## 2023-12-15 NOTE — PROGRESS NOTE ADULT - PROBLEM SELECTOR PLAN 1
Pt has hx of Type I DM, diagnosed January 2022 at [facility]. On admission, Glucose >400, Ag 27, pH 6.93. Likely 2/2 RSV infection.  States she takes Triseba at home, but unable to state dose at this time d/t mental status. Denies nausea, vomiting, polyuria and polydipsia.   S/p 50meq NaHCO3, insulin drip, 3L NS bolus, d5 with NaHCO3 in the ED. Anion gap closed today.     - Replete electrolytes as required  - cISS  - lispro 4u    Endo recs :   Poorly controlled Type 1 DM  - Patient seems to be very sensitive to low doses of insulin, making long term control of her diabetes very difficulty with subcutaneous injections. She ideally should be on a pump long term  - Patient can follow up at discharge with [facility] by calling [phone] to make an appointment. Pt has hx of Type I DM, diagnosed January 2022 at [facility]. On admission, Glucose >400, Ag 27, pH 6.93. Likely 2/2 RSV infection.  States she takes Triseba at home, but unable to state dose at this time d/t mental status. Denies nausea, vomiting, polyuria and polydipsia.   S/p 50meq NaHCO3, insulin drip, 3L NS bolus, d5 with NaHCO3 in the ED. Anion gap closed today.     - Replete electrolytes as required  - cISS  - lispro 4u    Endo recs :   Poorly controlled Type 1 DM  - Patient seems to be very sensitive to low doses of insulin, making long term control of her diabetes very difficulty with subcutaneous injections. She ideally should be on a pump long term  - Patient can follow up at discharge with [facility] by calling [phone] to make an appointment. Pt has hx of Type I DM, diagnosed January 2022 at [facility]. On admission, Glucose >400, Ag 27, pH 6.93. Likely 2/2 RSV infection. States she takes Triseba at home, but unable to state dose at the time d/t mental status. Denied nausea, vomiting, polyuria and polydipsia.   S/p 50meq NaHCO3, insulin drip, 3L NS bolus, d5 with NaHCO3 in the ED. Anion gap closed today.     - Replete electrolytes as required  - cISS  - lispro 4u    Endo recs :   Poorly controlled Type 1 DM  - Patient seems to be very sensitive to low doses of insulin, making long term control of her diabetes very difficulty with subcutaneous injections. She ideally should be on a pump long term  - Patient can follow up at discharge with [facility] by calling [phone] to make an appointment. Pt has hx of Type I DM, diagnosed January 2022 at [facility]. On admission, Glucose >400, Ag 27, pH 6.93. Likely 2/2 RSV infection. States she takes Triseba at home, but unable to state dose at the time d/t mental status. Denied nausea, vomiting, polyuria and polydipsia.   S/p 50meq NaHCO3, insulin drip, 3L NS bolus, d5 with NaHCO3 in the ED. Anion gap closed today.     - Replete electrolytes as required  - cISS  - lispro 4u    Endo recs :   Poorly controlled Type 1 DM  - Patient seems to be very sensitive to low doses of insulin, making long term control of her diabetes very difficulty with subcutaneous injections. She ideally should be on a pump long term  - Patient can follow up at discharge with [facility] by calling [phone] to make an appointment. Pt has hx of Type I DM, diagnosed January 2022 at [facility]. On admission, Glucose >400, Ag 27, pH 6.93. Likely 2/2 RSV infection. States she takes Triseba at home, but unable to state dose at the time d/t mental status. Denied nausea, vomiting, polyuria and polydipsia.  S/p 50meq NaHCO3, insulin drip, 3L NS bolus, d5 with NaHCO3 in the ED. Anion gap closed 12/5, patient was transferred to [facility].  On [facility], repeat blood work showed anion gap opened to 21, glucose 292, CO2 10 K 3.3. Patient only complained of nausea at this time. ICU was consulted.     Plan  - Replete electrolytes as required  - P    Endo recs :   Poorly controlled Type 1 DM  - Patient seems to be very sensitive to low doses of insulin, making long term control of her diabetes very difficulty with subcutaneous injections. She ideally should be on a pump long term  - Patient can follow up at discharge with [facility] by calling [phone] to make an appointment. Pt has hx of Type I DM, diagnosed January 2022 at [facility]. On admission, Glucose >400, Ag 27, pH 6.93. Likely 2/2 RSV infection. States she takes Triseba at home, but unable to state dose at the time d/t mental status. Denied nausea, vomiting, polyuria and polydipsia.  S/p 50meq NaHCO3, insulin drip, 3L NS bolus, d5 with NaHCO3 in the ED. Anion gap closed 12/5, patient was transferred to [facility].  On [facility], repeat blood work showed anion gap opened to 21, glucose 292, CO2 10 K 3.3. Patient only complained of nausea at this time. ICU was consulted.     Plan  - Replete electrolytes as required  - P    Endo recs :   Poorly controlled Type 1 DM  - Patient seems to be very sensitive to low doses of insulin, making long term control of her diabetes very difficulty with subcutaneous injections. She ideally should be on a pump long term  - Patient can follow up at discharge with [facility] by calling [phone] to make an appointment. Pt has hx of Type I DM, diagnosed January 2022 at [facility]. On admission, Glucose >400, Ag 27, pH 6.93. Likely 2/2 RSV infection. States she takes Triseba at home, but unable to state dose at the time d/t mental status. Denied nausea, vomiting, polyuria and polydipsia.  S/p 50meq NaHCO3, insulin drip, 3L NS bolus, d5 with NaHCO3 in the ED. Anion gap closed 12/5, patient was transferred to [facility].  On [facility], repeat blood work showed anion gap opened to 21, glucose 292, CO2 10 K 3.3. Patient only complained of nausea at this time. ICU was consulted.     -s/p NPH 10u w/ AG closure  -s/p 40meq KCl x2 PO  -c w/ Kphos powder x2 q2  -c w/ KCl 10meq x3 IV  -q2 fingersticks  -repeat BMP 2am & AM.    Endo recs :   Poorly controlled Type 1 DM  - Patient seems to be very sensitive to low doses of insulin, making long term control of her diabetes very difficulty with subcutaneous injections. She ideally should be on a pump long term  - Patient can follow up at discharge with [facility] by calling [phone] to make an appointment. Pt has hx of Type I DM, diagnosed January 2022 at [facility]. On admission, Glucose >400, Ag 27, pH 6.93. Likely 2/2 RSV infection. States she takes Triseba at home, but unable to state dose at the time d/t mental status. Denied nausea, vomiting, polyuria and polydipsia.  S/p 50meq NaHCO3, insulin drip, 3L NS bolus, d5 with NaHCO3 in the ED. Anion gap closed 12/5, patient was transferred to [facility].  On [facility], repeat blood work showed anion gap opened to 21, glucose 292, CO2 10 K 3.3. Patient only complained of nausea at this time. ICU was consulted.     -s/p NPH 10u w/ AG closure  -s/p 40meq KCl x2 PO  -c w/ Kphos powder x2 q2  -c w/ KCl 10meq x3 IV  -q2 fingersticks  -repeat BMP 2am & AM.    Endo recs :   Poorly controlled Type 1 DM  - Patient seems to be very sensitive to low doses of insulin, making long term control of her diabetes very difficulty with subcutaneous injections. She ideally should be on a pump long term  - Patient can follow up at discharge with [facility] by calling [phone] to make an appointment.

## 2023-12-15 NOTE — PROGRESS NOTE ADULT - SUBJECTIVE AND OBJECTIVE BOX
-----TRANSFER FROM MICU TO [facility]-------  22 y/o female with history of IDDM, depression, anxiety, presented with nausea and vomiting found to have +RSV and DKA (glucose on admission 470, urine ketones 160, urine glucose 1000)  and transferred to [facility] MICU for further management. In MICU, she was put on insulin drips, K was continuosly repleted, recieved bicarbonate. Her anion gap closed the morning of 12/15. She is ready to be stepped down to [facility].    INTERVAL HPI/OVERNIGHT EVENTS: dimple    SUBJECTIVE: Patient seen and examined at bedside, resting comfortably in bed, and does not appear to be in any acute distress. Patient has mild nausea but able to tolerate food.      MEDICATIONS  (STANDING):  chlorhexidine 2% Cloths 1 Application(s) Topical <User Schedule>  dextrose 5%. 1000 milliLiter(s) (100 mL/Hr) IV Continuous <Continuous>  dextrose 5%. 1000 milliLiter(s) (50 mL/Hr) IV Continuous <Continuous>  dextrose 50% Injectable 25 Gram(s) IV Push once  dextrose 50% Injectable 12.5 Gram(s) IV Push once  dextrose 50% Injectable 25 Gram(s) IV Push once  enoxaparin Injectable 40 milliGRAM(s) SubCutaneous every 24 hours  glucagon  Injectable 1 milliGRAM(s) IntraMuscular once  insulin lispro (ADMELOG) corrective regimen sliding scale   SubCutaneous Before meals and at bedtime  insulin lispro Injectable (ADMELOG) 4 Unit(s) SubCutaneous three times a day before meals    MEDICATIONS  (PRN):  dextrose Oral Gel 15 Gram(s) Oral once PRN Blood Glucose LESS THAN 70 milliGRAM(s)/deciliter  ondansetron Injectable 4 milliGRAM(s) IV Push every 8 hours PRN Nausea and/or Vomiting      Vital Signs Last 24 Hrs  T(C): 36.4 (15 Dec 2023 09:59), Max: 36.9 (14 Dec 2023 18:38)  T(F): 97.5 (15 Dec 2023 09:59), Max: 98.4 (14 Dec 2023 18:38)  HR: 79 (15 Dec 2023 11:00) (78 - 101)  BP: 98/68 (15 Dec 2023 11:00) (88/57 - 107/68)  BP(mean): 79 (15 Dec 2023 11:00) (64 - 82)  RR: 19 (15 Dec 2023 11:00) (9 - 24)  SpO2: 100% (15 Dec 2023 11:00) (98% - 100%)    Parameters below as of 15 Dec 2023 11:00  Patient On (Oxygen Delivery Method): room air        PHYSICAL EXAM:  General: in no acute distress  Eyes: EOMI intact bilaterally. Anicteric sclerae, moist conjunctivae  HENT: Moist mucous membranes  Neck: Trachea midline, supple  Lungs: CTA B/L. No wheezes, rales, or rhonchi  Cardiovascular: RRR. No murmurs, rubs, or gallops  Abdomen: Soft, non-tender non-distended; No rebound or guarding  Extremities: WWP, No clubbing, cyanosis or edema  Neurological: Alert and oriented  Skin: Warm and dry. No obvious rash     LABS:                        9.8    4.23  )-----------( 175      ( 15 Dec 2023 04:52 )             26.5     12-15    140  |  115<H>  |  7   ----------------------------<  210<H>  3.8   |  17<L>  |  0.67    Ca    7.8<L>      15 Dec 2023 04:52  Phos  2.3     12-15  Mg     2.4     12-15    TPro  5.0<L>  /  Alb  2.9<L>  /  TBili  0.2  /  DBili  x   /  AST  11  /  ALT  6<L>  /  AlkPhos  103  12-15    PT/INR - ( 14 Dec 2023 03:26 )   PT: 8.9 sec;   INR: 0.77          PTT - ( 14 Dec 2023 03:26 )  PTT:27.1 sec  Urinalysis Basic - ( 15 Dec 2023 04:52 )    Color: x / Appearance: x / SG: x / pH: x  Gluc: 210 mg/dL / Ketone: x  / Bili: x / Urobili: x   Blood: x / Protein: x / Nitrite: x   Leuk Esterase: x / RBC: x / WBC x   Sq Epi: x / Non Sq Epi: x / Bacteria: x        MICROBIOLOGY:    RADIOLOGY & ADDITIONAL STUDIES: -----TRANSFER FROM MICU TO [facility]-------  22 y/o female with history of IDDM, depression, anxiety, presented with nausea and vomiting found to have +RSV and DKA (glucose on admission 470, urine ketones 160, urine glucose 1000)  and transferred to [facility] MICU for further management. In MICU, she was put on insulin drips, K was continuosly repleted, recieved bicarbonate. Her anion gap closed the morning of 12/15. She is ready to be stepped down to [facility].    INTERVAL HPI/OVERNIGHT EVENTS: dimple    SUBJECTIVE: Patient seen and examined at bedside, resting comfortably in bed, and does not appear to be in any acute distress. Patient has mild nausea but able to tolerate food.      MEDICATIONS  (STANDING):  chlorhexidine 2% Cloths 1 Application(s) Topical <User Schedule>  dextrose 5%. 1000 milliLiter(s) (100 mL/Hr) IV Continuous <Continuous>  dextrose 5%. 1000 milliLiter(s) (50 mL/Hr) IV Continuous <Continuous>  dextrose 50% Injectable 25 Gram(s) IV Push once  dextrose 50% Injectable 12.5 Gram(s) IV Push once  dextrose 50% Injectable 25 Gram(s) IV Push once  enoxaparin Injectable 40 milliGRAM(s) SubCutaneous every 24 hours  glucagon  Injectable 1 milliGRAM(s) IntraMuscular once  insulin lispro (ADMELOG) corrective regimen sliding scale   SubCutaneous Before meals and at bedtime  insulin lispro Injectable (ADMELOG) 4 Unit(s) SubCutaneous three times a day before meals    MEDICATIONS  (PRN):  dextrose Oral Gel 15 Gram(s) Oral once PRN Blood Glucose LESS THAN 70 milliGRAM(s)/deciliter  ondansetron Injectable 4 milliGRAM(s) IV Push every 8 hours PRN Nausea and/or Vomiting      Vital Signs Last 24 Hrs  T(C): 36.4 (15 Dec 2023 09:59), Max: 36.9 (14 Dec 2023 18:38)  T(F): 97.5 (15 Dec 2023 09:59), Max: 98.4 (14 Dec 2023 18:38)  HR: 79 (15 Dec 2023 11:00) (78 - 101)  BP: 98/68 (15 Dec 2023 11:00) (88/57 - 107/68)  BP(mean): 79 (15 Dec 2023 11:00) (64 - 82)  RR: 19 (15 Dec 2023 11:00) (9 - 24)  SpO2: 100% (15 Dec 2023 11:00) (98% - 100%)    Parameters below as of 15 Dec 2023 11:00  Patient On (Oxygen Delivery Method): room air        PHYSICAL EXAM:  General: in no acute distress  Eyes: EOMI intact bilaterally. Anicteric sclerae, moist conjunctivae  HENT: Moist mucous membranes  Neck: Trachea midline, supple  Lungs: CTA B/L. No wheezes, rales, or rhonchi  Cardiovascular: RRR. No murmurs, rubs, or gallops  Abdomen: Soft, non-tender non-distended; No rebound or guarding  Extremities: WWP, No clubbing, cyanosis or edema  Neurological: Alert and oriented  Skin: Warm and dry. No obvious rash     LABS:                        9.8    4.23  )-----------( 175      ( 15 Dec 2023 04:52 )             26.5     12-15    140  |  115<H>  |  7   ----------------------------<  210<H>  3.8   |  17<L>  |  0.67    Ca    7.8<L>      15 Dec 2023 04:52  Phos  2.3     12-15  Mg     2.4     12-15    TPro  5.0<L>  /  Alb  2.9<L>  /  TBili  0.2  /  DBili  x   /  AST  11  /  ALT  6<L>  /  AlkPhos  103  12-15    PT/INR - ( 14 Dec 2023 03:26 )   PT: 8.9 sec;   INR: 0.77          PTT - ( 14 Dec 2023 03:26 )  PTT:27.1 sec  Urinalysis Basic - ( 15 Dec 2023 04:52 )    Color: x / Appearance: x / SG: x / pH: x  Gluc: 210 mg/dL / Ketone: x  / Bili: x / Urobili: x   Blood: x / Protein: x / Nitrite: x   Leuk Esterase: x / RBC: x / WBC x   Sq Epi: x / Non Sq Epi: x / Bacteria: x        MICROBIOLOGY:    RADIOLOGY & ADDITIONAL STUDIES: -----TRANSFER FROM MICU TO [facility]-------  22 y/o female with history of IDDM, depression, anxiety, presented with nausea and vomiting found to have +RSV and DKA (glucose on admission 470, urine ketones 160, urine glucose 1000)  and transferred to [facility] MICU for further management. In MICU, she was put on insulin drips, K was continuosly repleted, recieved bicarbonate. Her anion gap closed the morning of 12/15. She is ready to be stepped down to [facility].    INTERVAL HPI/OVERNIGHT EVENTS: dimple    SUBJECTIVE: Patient seen and examined at bedside, resting comfortably in bed, and does not appear to be in any acute distress. Patient has mild nausea but able to tolerate food.      MEDICATIONS  (STANDING):  chlorhexidine 2% Cloths 1 Application(s) Topical <User Schedule>  dextrose 5%. 1000 milliLiter(s) (100 mL/Hr) IV Continuous <Continuous>  dextrose 5%. 1000 milliLiter(s) (50 mL/Hr) IV Continuous <Continuous>  dextrose 50% Injectable 25 Gram(s) IV Push once  dextrose 50% Injectable 12.5 Gram(s) IV Push once  dextrose 50% Injectable 25 Gram(s) IV Push once  enoxaparin Injectable 40 milliGRAM(s) SubCutaneous every 24 hours  glucagon  Injectable 1 milliGRAM(s) IntraMuscular once  insulin lispro (ADMELOG) corrective regimen sliding scale   SubCutaneous Before meals and at bedtime  insulin lispro Injectable (ADMELOG) 4 Unit(s) SubCutaneous three times a day before meals    MEDICATIONS  (PRN):  dextrose Oral Gel 15 Gram(s) Oral once PRN Blood Glucose LESS THAN 70 milliGRAM(s)/deciliter  ondansetron Injectable 4 milliGRAM(s) IV Push every 8 hours PRN Nausea and/or Vomiting      Vital Signs Last 24 Hrs  T(C): 36.4 (15 Dec 2023 09:59), Max: 36.9 (14 Dec 2023 18:38)  T(F): 97.5 (15 Dec 2023 09:59), Max: 98.4 (14 Dec 2023 18:38)  HR: 79 (15 Dec 2023 11:00) (78 - 101)  BP: 98/68 (15 Dec 2023 11:00) (88/57 - 107/68)  BP(mean): 79 (15 Dec 2023 11:00) (64 - 82)  RR: 19 (15 Dec 2023 11:00) (9 - 24)  SpO2: 100% (15 Dec 2023 11:00) (98% - 100%)    Parameters below as of 15 Dec 2023 11:00  Patient On (Oxygen Delivery Method): room air        PHYSICAL EXAM:  General: in no acute distress  Eyes: EOMI intact bilaterally  HENT: Moist mucous membranes  Neck: Trachea midline  Lungs: CTA B/L  Cardiovascular: RRR  Abdomen: soft non tender nondistended  Extremities: WWP  Neurological: Alert and oriented  Skin: Warm and dry    LABS:                        9.8    4.23  )-----------( 175      ( 15 Dec 2023 04:52 )             26.5     12-15    140  |  115<H>  |  7   ----------------------------<  210<H>  3.8   |  17<L>  |  0.67    Ca    7.8<L>      15 Dec 2023 04:52  Phos  2.3     12-15  Mg     2.4     12-15    TPro  5.0<L>  /  Alb  2.9<L>  /  TBili  0.2  /  DBili  x   /  AST  11  /  ALT  6<L>  /  AlkPhos  103  12-15    PT/INR - ( 14 Dec 2023 03:26 )   PT: 8.9 sec;   INR: 0.77          PTT - ( 14 Dec 2023 03:26 )  PTT:27.1 sec  Urinalysis Basic - ( 15 Dec 2023 04:52 ) -----TRANSFER FROM MICU TO [facility]-------  22 y/o female with history of IDDM, depression, anxiety, presented with nausea and vomiting found to have +RSV and DKA (glucose on admission 470, urine ketones 160, urine glucose 1000)  and transferred to [facility] MICU for further management. In MICU, she was put on insulin drips, K was continuosly repleted, recieved bicarbonate. Her anion gap closed the morning of 12/15. She is ready to be stepped down to [facility].    INTERVAL HPI/OVERNIGHT EVENTS: dimple    SUBJECTIVE: Patient seen and examined at bedside, resting comfortably in bed, and does not appear to be in any acute distress. Patient has mild nausea but able to tolerate food.      MEDICATIONS  (STANDING):  chlorhexidine 2% Cloths 1 Application(s) Topical <User Schedule>  dextrose 5%. 1000 milliLiter(s) (100 mL/Hr) IV Continuous <Continuous>  dextrose 5%. 1000 milliLiter(s) (50 mL/Hr) IV Continuous <Continuous>  dextrose 50% Injectable 25 Gram(s) IV Push once  dextrose 50% Injectable 12.5 Gram(s) IV Push once  dextrose 50% Injectable 25 Gram(s) IV Push once  enoxaparin Injectable 40 milliGRAM(s) SubCutaneous every 24 hours  glucagon  Injectable 1 milliGRAM(s) IntraMuscular once  insulin lispro (ADMELOG) corrective regimen sliding scale   SubCutaneous Before meals and at bedtime  insulin lispro Injectable (ADMELOG) 4 Unit(s) SubCutaneous three times a day before meals    MEDICATIONS  (PRN):  dextrose Oral Gel 15 Gram(s) Oral once PRN Blood Glucose LESS THAN 70 milliGRAM(s)/deciliter  ondansetron Injectable 4 milliGRAM(s) IV Push every 8 hours PRN Nausea and/or Vomiting      Vital Signs Last 24 Hrs  T(C): 36.4 (15 Dec 2023 09:59), Max: 36.9 (14 Dec 2023 18:38)  T(F): 97.5 (15 Dec 2023 09:59), Max: 98.4 (14 Dec 2023 18:38)  HR: 79 (15 Dec 2023 11:00) (78 - 101)  BP: 98/68 (15 Dec 2023 11:00) (88/57 - 107/68)  BP(mean): 79 (15 Dec 2023 11:00) (64 - 82)  RR: 19 (15 Dec 2023 11:00) (9 - 24)  SpO2: 100% (15 Dec 2023 11:00) (98% - 100%)    Parameters below as of 15 Dec 2023 11:00  Patient On (Oxygen Delivery Method): room air        PHYSICAL EXAM:  General: in no acute distress  Eyes: EOMI intact bilaterally  HENT: Moist mucous membranes  Neck: Trachea midline  Lungs: CTA B/L  Cardiovascular: RRR  Abdomen: soft non tender nondistended  Extremities: WWP  Neurological: Alert and oriented  Skin: Warm and dry    LABS:                        9.8    4.23  )-----------( 175      ( 15 Dec 2023 04:52 )             26.5     12-15    140  |  115<H>  |  7   ----------------------------<  210<H>  3.8   |  17<L>  |  0.67    Ca    7.8<L>      15 Dec 2023 04:52  Phos  2.3     12-15  Mg     2.4     12-15    TPro  5.0<L>  /  Alb  2.9<L>  /  TBili  0.2  /  DBili  x   /  AST  11  /  ALT  6<L>  /  AlkPhos  103  12-15    PT/INR - ( 14 Dec 2023 03:26 )   PT: 8.9 sec;   INR: 0.77          PTT - ( 14 Dec 2023 03:26 )  PTT:27.1 sec  Urinalysis Basic - ( 15 Dec 2023 04:52 ) -----TRANSFER FROM MICU TO [facility]-------  24 y/o female with history of IDDM, depression, anxiety, presented with nausea and vomiting found to have +RSV and DKA (glucose on admission 470, urine ketones 160, urine glucose 1000)  and transferred to [facility] MICU for further management. In MICU, she was put on insulin drips, K was continuously repleted, recieved bicarbonate. Her anion gap closed the morning of 12/15. She is ready to be stepped down to [facility].    INTERVAL HPI/OVERNIGHT EVENTS: dimple    SUBJECTIVE: Patient seen and examined at bedside, resting comfortably in bed, and does not appear to be in any acute distress. Patient has mild nausea but able to tolerate food.    MEDICATIONS  (STANDING):  chlorhexidine 2% Cloths 1 Application(s) Topical <User Schedule>  dextrose 5%. 1000 milliLiter(s) (100 mL/Hr) IV Continuous <Continuous>  dextrose 5%. 1000 milliLiter(s) (50 mL/Hr) IV Continuous <Continuous>  dextrose 50% Injectable 25 Gram(s) IV Push once  dextrose 50% Injectable 12.5 Gram(s) IV Push once  dextrose 50% Injectable 25 Gram(s) IV Push once  enoxaparin Injectable 40 milliGRAM(s) SubCutaneous every 24 hours  glucagon  Injectable 1 milliGRAM(s) IntraMuscular once  insulin lispro (ADMELOG) corrective regimen sliding scale   SubCutaneous Before meals and at bedtime  insulin lispro Injectable (ADMELOG) 4 Unit(s) SubCutaneous three times a day before meals    MEDICATIONS  (PRN):  dextrose Oral Gel 15 Gram(s) Oral once PRN Blood Glucose LESS THAN 70 milliGRAM(s)/deciliter  ondansetron Injectable 4 milliGRAM(s) IV Push every 8 hours PRN Nausea and/or Vomiting    Vital Signs Last 24 Hrs  T(C): 36.4 (15 Dec 2023 09:59), Max: 36.9 (14 Dec 2023 18:38)  T(F): 97.5 (15 Dec 2023 09:59), Max: 98.4 (14 Dec 2023 18:38)  HR: 79 (15 Dec 2023 11:00) (78 - 101)  BP: 98/68 (15 Dec 2023 11:00) (88/57 - 107/68)  BP(mean): 79 (15 Dec 2023 11:00) (64 - 82)  RR: 19 (15 Dec 2023 11:00) (9 - 24)  SpO2: 100% (15 Dec 2023 11:00) (98% - 100%)    Parameters below as of 15 Dec 2023 11:00  Patient On (Oxygen Delivery Method): room air    PHYSICAL EXAM:  General: in no acute distress  Eyes: EOMI intact bilaterally  HENT: Moist mucous membranes  Neck: Trachea midline  Lungs: CTA B/L  Cardiovascular: RRR  Abdomen: soft non tender nondistended  Extremities: WWP  Neurological: Alert and oriented  Skin: Warm and dry    LABS:                        9.8    4.23  )-----------( 175      ( 15 Dec 2023 04:52 )             26.5     12-15    140  |  115<H>  |  7   ----------------------------<  210<H>  3.8   |  17<L>  |  0.67    Ca    7.8<L>      15 Dec 2023 04:52  Phos  2.3     12-15  Mg     2.4     12-15    TPro  5.0<L>  /  Alb  2.9<L>  /  TBili  0.2  /  DBili  x   /  AST  11  /  ALT  6<L>  /  AlkPhos  103  12-15    PT/INR - ( 14 Dec 2023 03:26 )   PT: 8.9 sec;   INR: 0.77          PTT - ( 14 Dec 2023 03:26 )  PTT:27.1 sec  Urinalysis Basic - ( 15 Dec 2023 04:52 ) -----TRANSFER FROM MICU TO [facility]-------  24 y/o female with history of IDDM, depression, anxiety, presented with nausea and vomiting found to have +RSV and DKA (glucose on admission 470, urine ketones 160, urine glucose 1000)  and transferred to [facility] MICU for further management. In MICU, she was put on insulin drips, K was continuously repleted, recieved bicarbonate. Her anion gap closed the morning of 12/15. She is ready to be stepped down to [facility].    INTERVAL HPI/OVERNIGHT EVENTS: dimple    SUBJECTIVE: Patient seen and examined at bedside, resting comfortably in bed, and does not appear to be in any acute distress. Patient has mild nausea but able to tolerate food.    MEDICATIONS  (STANDING):  chlorhexidine 2% Cloths 1 Application(s) Topical <User Schedule>  dextrose 5%. 1000 milliLiter(s) (100 mL/Hr) IV Continuous <Continuous>  dextrose 5%. 1000 milliLiter(s) (50 mL/Hr) IV Continuous <Continuous>  dextrose 50% Injectable 25 Gram(s) IV Push once  dextrose 50% Injectable 12.5 Gram(s) IV Push once  dextrose 50% Injectable 25 Gram(s) IV Push once  enoxaparin Injectable 40 milliGRAM(s) SubCutaneous every 24 hours  glucagon  Injectable 1 milliGRAM(s) IntraMuscular once  insulin lispro (ADMELOG) corrective regimen sliding scale   SubCutaneous Before meals and at bedtime  insulin lispro Injectable (ADMELOG) 4 Unit(s) SubCutaneous three times a day before meals    MEDICATIONS  (PRN):  dextrose Oral Gel 15 Gram(s) Oral once PRN Blood Glucose LESS THAN 70 milliGRAM(s)/deciliter  ondansetron Injectable 4 milliGRAM(s) IV Push every 8 hours PRN Nausea and/or Vomiting    Vital Signs Last 24 Hrs  T(C): 36.4 (15 Dec 2023 09:59), Max: 36.9 (14 Dec 2023 18:38)  T(F): 97.5 (15 Dec 2023 09:59), Max: 98.4 (14 Dec 2023 18:38)  HR: 79 (15 Dec 2023 11:00) (78 - 101)  BP: 98/68 (15 Dec 2023 11:00) (88/57 - 107/68)  BP(mean): 79 (15 Dec 2023 11:00) (64 - 82)  RR: 19 (15 Dec 2023 11:00) (9 - 24)  SpO2: 100% (15 Dec 2023 11:00) (98% - 100%)    Parameters below as of 15 Dec 2023 11:00  Patient On (Oxygen Delivery Method): room air    PHYSICAL EXAM:  General: in no acute distress  Eyes: EOMI intact bilaterally  HENT: Moist mucous membranes  Neck: Trachea midline  Lungs: CTA B/L  Cardiovascular: RRR  Abdomen: soft non tender nondistended  Extremities: WWP  Neurological: Alert and oriented  Skin: Warm and dry    LABS:                        9.8    4.23  )-----------( 175      ( 15 Dec 2023 04:52 )             26.5     12-15    140  |  115<H>  |  7   ----------------------------<  210<H>  3.8   |  17<L>  |  0.67    Ca    7.8<L>      15 Dec 2023 04:52  Phos  2.3     12-15  Mg     2.4     12-15    TPro  5.0<L>  /  Alb  2.9<L>  /  TBili  0.2  /  DBili  x   /  AST  11  /  ALT  6<L>  /  AlkPhos  103  12-15    PT/INR - ( 14 Dec 2023 03:26 )   PT: 8.9 sec;   INR: 0.77          PTT - ( 14 Dec 2023 03:26 )  PTT:27.1 sec  Urinalysis Basic - ( 15 Dec 2023 04:52 )

## 2023-12-15 NOTE — PROGRESS NOTE ADULT - PROBLEM SELECTOR PLAN 2
URI symptoms, found to be RSV+  - Supportive measures URI symptoms, found to be RSV+    - c/w Supportive measures

## 2023-12-15 NOTE — PROGRESS NOTE ADULT - NS ATTEND AMEND GEN_ALL_CORE FT
Pt seen on rounds this afternoon.  Transferred off ICU.  Drip stopped overnight and pt given 8 units Lantus at 3 AM as noted above.  She describes mild nausea but seems to be tolerating diet.  Glucose was 154 before breakfast but spiked to 338 afterward--had received 1 unit of lispro for the meal, which was ?? just coverage vs her usual premeal insulin as per her very high carb ratio.    Covered with 4 lispro for the 338, with another 4 units 2 hours later for   Pt is more alert, able to give a clearer history, cassandra regarding her stem cell transplant.  This was apparently done in June.  Although she says that she was able to get off insulin (?? just basal) for some period of time, her post-prandial glucoses seem to have been above target (170-180) even when the transplant was "working."  Unfortunately, she stopped her fingerstick monitoring at around the same time as her transplant was showing signs of failure.  Explained to her that her current DKA was evidence of failure of the transplant (which was supposed to provide at least basal insulin)  Her A1c level of 14% suggests that her current decompensation was not simply recent or triggered by the RSV, but has been going on for 2-3 months.    --Will increase the Lantus to 12 units for tonight  --start 5 units lispro premeal--will assume that the pt has no endogenous insulin reserves at this point  --Check C-peptide tomorrow AM  --Her corrected calcium level is low-normal at 8.7 mg% today.  PTH is elevated to 82 pg/ml (presumably in response to the intermittent hypocalcemia, which would be appropriate).  25-D level is low at 22 ng/ml (also contributing to the increased PTH).  To start vitamin D replacement at 2000 units/day

## 2023-12-16 LAB
ALBUMIN SERPL ELPH-MCNC: 3.4 G/DL — SIGNIFICANT CHANGE UP (ref 3.3–5)
ALBUMIN SERPL ELPH-MCNC: 3.4 G/DL — SIGNIFICANT CHANGE UP (ref 3.3–5)
ALBUMIN SERPL ELPH-MCNC: 3.7 G/DL — SIGNIFICANT CHANGE UP (ref 3.3–5)
ALBUMIN SERPL ELPH-MCNC: 3.7 G/DL — SIGNIFICANT CHANGE UP (ref 3.3–5)
ALP SERPL-CCNC: 128 U/L — HIGH (ref 40–120)
ALP SERPL-CCNC: 128 U/L — HIGH (ref 40–120)
ALP SERPL-CCNC: 146 U/L — HIGH (ref 40–120)
ALP SERPL-CCNC: 146 U/L — HIGH (ref 40–120)
ALT FLD-CCNC: 10 U/L — SIGNIFICANT CHANGE UP (ref 10–45)
ALT FLD-CCNC: 10 U/L — SIGNIFICANT CHANGE UP (ref 10–45)
ALT FLD-CCNC: 7 U/L — LOW (ref 10–45)
ALT FLD-CCNC: 7 U/L — LOW (ref 10–45)
ANION GAP SERPL CALC-SCNC: 11 MMOL/L — SIGNIFICANT CHANGE UP (ref 5–17)
ANION GAP SERPL CALC-SCNC: 11 MMOL/L — SIGNIFICANT CHANGE UP (ref 5–17)
ANION GAP SERPL CALC-SCNC: 13 MMOL/L — SIGNIFICANT CHANGE UP (ref 5–17)
ANION GAP SERPL CALC-SCNC: 13 MMOL/L — SIGNIFICANT CHANGE UP (ref 5–17)
ANION GAP SERPL CALC-SCNC: 14 MMOL/L — SIGNIFICANT CHANGE UP (ref 5–17)
ANION GAP SERPL CALC-SCNC: 14 MMOL/L — SIGNIFICANT CHANGE UP (ref 5–17)
ANION GAP SERPL CALC-SCNC: 15 MMOL/L — SIGNIFICANT CHANGE UP (ref 5–17)
ANION GAP SERPL CALC-SCNC: 15 MMOL/L — SIGNIFICANT CHANGE UP (ref 5–17)
AST SERPL-CCNC: 14 U/L — SIGNIFICANT CHANGE UP (ref 10–40)
AST SERPL-CCNC: 14 U/L — SIGNIFICANT CHANGE UP (ref 10–40)
AST SERPL-CCNC: 16 U/L — SIGNIFICANT CHANGE UP (ref 10–40)
AST SERPL-CCNC: 16 U/L — SIGNIFICANT CHANGE UP (ref 10–40)
BASOPHILS # BLD AUTO: 0.03 K/UL — SIGNIFICANT CHANGE UP (ref 0–0.2)
BASOPHILS # BLD AUTO: 0.03 K/UL — SIGNIFICANT CHANGE UP (ref 0–0.2)
BASOPHILS NFR BLD AUTO: 0.7 % — SIGNIFICANT CHANGE UP (ref 0–2)
BASOPHILS NFR BLD AUTO: 0.7 % — SIGNIFICANT CHANGE UP (ref 0–2)
BILIRUB SERPL-MCNC: 0.2 MG/DL — SIGNIFICANT CHANGE UP (ref 0.2–1.2)
BILIRUB SERPL-MCNC: 0.2 MG/DL — SIGNIFICANT CHANGE UP (ref 0.2–1.2)
BILIRUB SERPL-MCNC: 0.4 MG/DL — SIGNIFICANT CHANGE UP (ref 0.2–1.2)
BILIRUB SERPL-MCNC: 0.4 MG/DL — SIGNIFICANT CHANGE UP (ref 0.2–1.2)
BUN SERPL-MCNC: 12 MG/DL — SIGNIFICANT CHANGE UP (ref 7–23)
BUN SERPL-MCNC: 12 MG/DL — SIGNIFICANT CHANGE UP (ref 7–23)
BUN SERPL-MCNC: 18 MG/DL — SIGNIFICANT CHANGE UP (ref 7–23)
BUN SERPL-MCNC: 18 MG/DL — SIGNIFICANT CHANGE UP (ref 7–23)
BUN SERPL-MCNC: 19 MG/DL — SIGNIFICANT CHANGE UP (ref 7–23)
BUN SERPL-MCNC: 19 MG/DL — SIGNIFICANT CHANGE UP (ref 7–23)
BUN SERPL-MCNC: 9 MG/DL — SIGNIFICANT CHANGE UP (ref 7–23)
BUN SERPL-MCNC: 9 MG/DL — SIGNIFICANT CHANGE UP (ref 7–23)
C PEPTIDE SERPL-MCNC: 0.6 NG/ML — LOW (ref 1.1–4.4)
C PEPTIDE SERPL-MCNC: 0.6 NG/ML — LOW (ref 1.1–4.4)
CALCIUM SERPL-MCNC: 8.5 MG/DL — SIGNIFICANT CHANGE UP (ref 8.4–10.5)
CALCIUM SERPL-MCNC: 8.5 MG/DL — SIGNIFICANT CHANGE UP (ref 8.4–10.5)
CALCIUM SERPL-MCNC: 8.6 MG/DL — SIGNIFICANT CHANGE UP (ref 8.4–10.5)
CALCIUM SERPL-MCNC: 8.6 MG/DL — SIGNIFICANT CHANGE UP (ref 8.4–10.5)
CALCIUM SERPL-MCNC: 8.8 MG/DL — SIGNIFICANT CHANGE UP (ref 8.4–10.5)
CALCIUM SERPL-MCNC: 8.8 MG/DL — SIGNIFICANT CHANGE UP (ref 8.4–10.5)
CALCIUM SERPL-MCNC: 8.9 MG/DL — SIGNIFICANT CHANGE UP (ref 8.4–10.5)
CALCIUM SERPL-MCNC: 8.9 MG/DL — SIGNIFICANT CHANGE UP (ref 8.4–10.5)
CHLORIDE SERPL-SCNC: 103 MMOL/L — SIGNIFICANT CHANGE UP (ref 96–108)
CHLORIDE SERPL-SCNC: 103 MMOL/L — SIGNIFICANT CHANGE UP (ref 96–108)
CHLORIDE SERPL-SCNC: 105 MMOL/L — SIGNIFICANT CHANGE UP (ref 96–108)
CHLORIDE SERPL-SCNC: 105 MMOL/L — SIGNIFICANT CHANGE UP (ref 96–108)
CHLORIDE SERPL-SCNC: 106 MMOL/L — SIGNIFICANT CHANGE UP (ref 96–108)
CO2 SERPL-SCNC: 14 MMOL/L — LOW (ref 22–31)
CO2 SERPL-SCNC: 14 MMOL/L — LOW (ref 22–31)
CO2 SERPL-SCNC: 16 MMOL/L — LOW (ref 22–31)
CO2 SERPL-SCNC: 16 MMOL/L — LOW (ref 22–31)
CO2 SERPL-SCNC: 19 MMOL/L — LOW (ref 22–31)
CO2 SERPL-SCNC: 19 MMOL/L — LOW (ref 22–31)
CO2 SERPL-SCNC: 20 MMOL/L — LOW (ref 22–31)
CO2 SERPL-SCNC: 20 MMOL/L — LOW (ref 22–31)
CREAT SERPL-MCNC: 0.68 MG/DL — SIGNIFICANT CHANGE UP (ref 0.5–1.3)
CREAT SERPL-MCNC: 0.68 MG/DL — SIGNIFICANT CHANGE UP (ref 0.5–1.3)
CREAT SERPL-MCNC: 0.76 MG/DL — SIGNIFICANT CHANGE UP (ref 0.5–1.3)
CREAT SERPL-MCNC: 0.78 MG/DL — SIGNIFICANT CHANGE UP (ref 0.5–1.3)
CREAT SERPL-MCNC: 0.78 MG/DL — SIGNIFICANT CHANGE UP (ref 0.5–1.3)
EGFR: 109 ML/MIN/1.73M2 — SIGNIFICANT CHANGE UP
EGFR: 109 ML/MIN/1.73M2 — SIGNIFICANT CHANGE UP
EGFR: 113 ML/MIN/1.73M2 — SIGNIFICANT CHANGE UP
EGFR: 125 ML/MIN/1.73M2 — SIGNIFICANT CHANGE UP
EGFR: 125 ML/MIN/1.73M2 — SIGNIFICANT CHANGE UP
EOSINOPHIL # BLD AUTO: 0.03 K/UL — SIGNIFICANT CHANGE UP (ref 0–0.5)
EOSINOPHIL # BLD AUTO: 0.03 K/UL — SIGNIFICANT CHANGE UP (ref 0–0.5)
EOSINOPHIL NFR BLD AUTO: 0.7 % — SIGNIFICANT CHANGE UP (ref 0–6)
EOSINOPHIL NFR BLD AUTO: 0.7 % — SIGNIFICANT CHANGE UP (ref 0–6)
GLUCOSE BLDC GLUCOMTR-MCNC: 148 MG/DL — HIGH (ref 70–99)
GLUCOSE BLDC GLUCOMTR-MCNC: 148 MG/DL — HIGH (ref 70–99)
GLUCOSE BLDC GLUCOMTR-MCNC: 157 MG/DL — HIGH (ref 70–99)
GLUCOSE BLDC GLUCOMTR-MCNC: 157 MG/DL — HIGH (ref 70–99)
GLUCOSE BLDC GLUCOMTR-MCNC: 161 MG/DL — HIGH (ref 70–99)
GLUCOSE BLDC GLUCOMTR-MCNC: 161 MG/DL — HIGH (ref 70–99)
GLUCOSE BLDC GLUCOMTR-MCNC: 175 MG/DL — HIGH (ref 70–99)
GLUCOSE BLDC GLUCOMTR-MCNC: 175 MG/DL — HIGH (ref 70–99)
GLUCOSE BLDC GLUCOMTR-MCNC: 203 MG/DL — HIGH (ref 70–99)
GLUCOSE BLDC GLUCOMTR-MCNC: 227 MG/DL — HIGH (ref 70–99)
GLUCOSE BLDC GLUCOMTR-MCNC: 227 MG/DL — HIGH (ref 70–99)
GLUCOSE BLDC GLUCOMTR-MCNC: 265 MG/DL — HIGH (ref 70–99)
GLUCOSE BLDC GLUCOMTR-MCNC: 265 MG/DL — HIGH (ref 70–99)
GLUCOSE BLDC GLUCOMTR-MCNC: 273 MG/DL — HIGH (ref 70–99)
GLUCOSE BLDC GLUCOMTR-MCNC: 273 MG/DL — HIGH (ref 70–99)
GLUCOSE BLDC GLUCOMTR-MCNC: 281 MG/DL — HIGH (ref 70–99)
GLUCOSE BLDC GLUCOMTR-MCNC: 281 MG/DL — HIGH (ref 70–99)
GLUCOSE BLDC GLUCOMTR-MCNC: 303 MG/DL — HIGH (ref 70–99)
GLUCOSE BLDC GLUCOMTR-MCNC: 303 MG/DL — HIGH (ref 70–99)
GLUCOSE BLDC GLUCOMTR-MCNC: 329 MG/DL — HIGH (ref 70–99)
GLUCOSE BLDC GLUCOMTR-MCNC: 329 MG/DL — HIGH (ref 70–99)
GLUCOSE SERPL-MCNC: 174 MG/DL — HIGH (ref 70–99)
GLUCOSE SERPL-MCNC: 174 MG/DL — HIGH (ref 70–99)
GLUCOSE SERPL-MCNC: 247 MG/DL — HIGH (ref 70–99)
GLUCOSE SERPL-MCNC: 247 MG/DL — HIGH (ref 70–99)
GLUCOSE SERPL-MCNC: 250 MG/DL — HIGH (ref 70–99)
GLUCOSE SERPL-MCNC: 250 MG/DL — HIGH (ref 70–99)
GLUCOSE SERPL-MCNC: 318 MG/DL — HIGH (ref 70–99)
GLUCOSE SERPL-MCNC: 318 MG/DL — HIGH (ref 70–99)
HCT VFR BLD CALC: 33.2 % — LOW (ref 34.5–45)
HCT VFR BLD CALC: 33.2 % — LOW (ref 34.5–45)
HGB BLD-MCNC: 11.8 G/DL — SIGNIFICANT CHANGE UP (ref 11.5–15.5)
HGB BLD-MCNC: 11.8 G/DL — SIGNIFICANT CHANGE UP (ref 11.5–15.5)
IMM GRANULOCYTES NFR BLD AUTO: 2.4 % — HIGH (ref 0–0.9)
IMM GRANULOCYTES NFR BLD AUTO: 2.4 % — HIGH (ref 0–0.9)
LYMPHOCYTES # BLD AUTO: 1.37 K/UL — SIGNIFICANT CHANGE UP (ref 1–3.3)
LYMPHOCYTES # BLD AUTO: 1.37 K/UL — SIGNIFICANT CHANGE UP (ref 1–3.3)
LYMPHOCYTES # BLD AUTO: 33 % — SIGNIFICANT CHANGE UP (ref 13–44)
LYMPHOCYTES # BLD AUTO: 33 % — SIGNIFICANT CHANGE UP (ref 13–44)
MAGNESIUM SERPL-MCNC: 1.8 MG/DL — SIGNIFICANT CHANGE UP (ref 1.6–2.6)
MAGNESIUM SERPL-MCNC: 1.8 MG/DL — SIGNIFICANT CHANGE UP (ref 1.6–2.6)
MAGNESIUM SERPL-MCNC: 1.9 MG/DL — SIGNIFICANT CHANGE UP (ref 1.6–2.6)
MAGNESIUM SERPL-MCNC: 1.9 MG/DL — SIGNIFICANT CHANGE UP (ref 1.6–2.6)
MAGNESIUM SERPL-MCNC: 2 MG/DL — SIGNIFICANT CHANGE UP (ref 1.6–2.6)
MCHC RBC-ENTMCNC: 28.1 PG — SIGNIFICANT CHANGE UP (ref 27–34)
MCHC RBC-ENTMCNC: 28.1 PG — SIGNIFICANT CHANGE UP (ref 27–34)
MCHC RBC-ENTMCNC: 35.5 GM/DL — SIGNIFICANT CHANGE UP (ref 32–36)
MCHC RBC-ENTMCNC: 35.5 GM/DL — SIGNIFICANT CHANGE UP (ref 32–36)
MCV RBC AUTO: 79 FL — LOW (ref 80–100)
MCV RBC AUTO: 79 FL — LOW (ref 80–100)
MONOCYTES # BLD AUTO: 0.44 K/UL — SIGNIFICANT CHANGE UP (ref 0–0.9)
MONOCYTES # BLD AUTO: 0.44 K/UL — SIGNIFICANT CHANGE UP (ref 0–0.9)
MONOCYTES NFR BLD AUTO: 10.6 % — SIGNIFICANT CHANGE UP (ref 2–14)
MONOCYTES NFR BLD AUTO: 10.6 % — SIGNIFICANT CHANGE UP (ref 2–14)
NEUTROPHILS # BLD AUTO: 2.18 K/UL — SIGNIFICANT CHANGE UP (ref 1.8–7.4)
NEUTROPHILS # BLD AUTO: 2.18 K/UL — SIGNIFICANT CHANGE UP (ref 1.8–7.4)
NEUTROPHILS NFR BLD AUTO: 52.6 % — SIGNIFICANT CHANGE UP (ref 43–77)
NEUTROPHILS NFR BLD AUTO: 52.6 % — SIGNIFICANT CHANGE UP (ref 43–77)
NRBC # BLD: 0 /100 WBCS — SIGNIFICANT CHANGE UP (ref 0–0)
NRBC # BLD: 0 /100 WBCS — SIGNIFICANT CHANGE UP (ref 0–0)
PHOSPHATE SERPL-MCNC: 2.2 MG/DL — LOW (ref 2.5–4.5)
PHOSPHATE SERPL-MCNC: 2.2 MG/DL — LOW (ref 2.5–4.5)
PHOSPHATE SERPL-MCNC: 2.4 MG/DL — LOW (ref 2.5–4.5)
PHOSPHATE SERPL-MCNC: 2.4 MG/DL — LOW (ref 2.5–4.5)
PHOSPHATE SERPL-MCNC: 2.8 MG/DL — SIGNIFICANT CHANGE UP (ref 2.5–4.5)
PHOSPHATE SERPL-MCNC: 2.8 MG/DL — SIGNIFICANT CHANGE UP (ref 2.5–4.5)
PLATELET # BLD AUTO: 200 K/UL — SIGNIFICANT CHANGE UP (ref 150–400)
PLATELET # BLD AUTO: 200 K/UL — SIGNIFICANT CHANGE UP (ref 150–400)
POTASSIUM SERPL-MCNC: 3.3 MMOL/L — LOW (ref 3.5–5.3)
POTASSIUM SERPL-MCNC: 3.3 MMOL/L — LOW (ref 3.5–5.3)
POTASSIUM SERPL-MCNC: 3.5 MMOL/L — SIGNIFICANT CHANGE UP (ref 3.5–5.3)
POTASSIUM SERPL-MCNC: 3.5 MMOL/L — SIGNIFICANT CHANGE UP (ref 3.5–5.3)
POTASSIUM SERPL-MCNC: 4 MMOL/L — SIGNIFICANT CHANGE UP (ref 3.5–5.3)
POTASSIUM SERPL-MCNC: 4 MMOL/L — SIGNIFICANT CHANGE UP (ref 3.5–5.3)
POTASSIUM SERPL-MCNC: 4.6 MMOL/L — SIGNIFICANT CHANGE UP (ref 3.5–5.3)
POTASSIUM SERPL-MCNC: 4.6 MMOL/L — SIGNIFICANT CHANGE UP (ref 3.5–5.3)
POTASSIUM SERPL-SCNC: 3.3 MMOL/L — LOW (ref 3.5–5.3)
POTASSIUM SERPL-SCNC: 3.3 MMOL/L — LOW (ref 3.5–5.3)
POTASSIUM SERPL-SCNC: 3.5 MMOL/L — SIGNIFICANT CHANGE UP (ref 3.5–5.3)
POTASSIUM SERPL-SCNC: 3.5 MMOL/L — SIGNIFICANT CHANGE UP (ref 3.5–5.3)
POTASSIUM SERPL-SCNC: 4 MMOL/L — SIGNIFICANT CHANGE UP (ref 3.5–5.3)
POTASSIUM SERPL-SCNC: 4 MMOL/L — SIGNIFICANT CHANGE UP (ref 3.5–5.3)
POTASSIUM SERPL-SCNC: 4.6 MMOL/L — SIGNIFICANT CHANGE UP (ref 3.5–5.3)
POTASSIUM SERPL-SCNC: 4.6 MMOL/L — SIGNIFICANT CHANGE UP (ref 3.5–5.3)
PROT SERPL-MCNC: 5.8 G/DL — LOW (ref 6–8.3)
PROT SERPL-MCNC: 5.8 G/DL — LOW (ref 6–8.3)
PROT SERPL-MCNC: 6.2 G/DL — SIGNIFICANT CHANGE UP (ref 6–8.3)
PROT SERPL-MCNC: 6.2 G/DL — SIGNIFICANT CHANGE UP (ref 6–8.3)
RBC # BLD: 4.2 M/UL — SIGNIFICANT CHANGE UP (ref 3.8–5.2)
RBC # BLD: 4.2 M/UL — SIGNIFICANT CHANGE UP (ref 3.8–5.2)
RBC # FLD: 13.4 % — SIGNIFICANT CHANGE UP (ref 10.3–14.5)
RBC # FLD: 13.4 % — SIGNIFICANT CHANGE UP (ref 10.3–14.5)
SODIUM SERPL-SCNC: 132 MMOL/L — LOW (ref 135–145)
SODIUM SERPL-SCNC: 132 MMOL/L — LOW (ref 135–145)
SODIUM SERPL-SCNC: 135 MMOL/L — SIGNIFICANT CHANGE UP (ref 135–145)
SODIUM SERPL-SCNC: 135 MMOL/L — SIGNIFICANT CHANGE UP (ref 135–145)
SODIUM SERPL-SCNC: 136 MMOL/L — SIGNIFICANT CHANGE UP (ref 135–145)
SODIUM SERPL-SCNC: 136 MMOL/L — SIGNIFICANT CHANGE UP (ref 135–145)
SODIUM SERPL-SCNC: 139 MMOL/L — SIGNIFICANT CHANGE UP (ref 135–145)
SODIUM SERPL-SCNC: 139 MMOL/L — SIGNIFICANT CHANGE UP (ref 135–145)
WBC # BLD: 4.15 K/UL — SIGNIFICANT CHANGE UP (ref 3.8–10.5)
WBC # BLD: 4.15 K/UL — SIGNIFICANT CHANGE UP (ref 3.8–10.5)
WBC # FLD AUTO: 4.15 K/UL — SIGNIFICANT CHANGE UP (ref 3.8–10.5)
WBC # FLD AUTO: 4.15 K/UL — SIGNIFICANT CHANGE UP (ref 3.8–10.5)

## 2023-12-16 PROCEDURE — 99231 SBSQ HOSP IP/OBS SF/LOW 25: CPT | Mod: GC

## 2023-12-16 RX ORDER — INSULIN GLARGINE 100 [IU]/ML
9 INJECTION, SOLUTION SUBCUTANEOUS ONCE
Refills: 0 | Status: COMPLETED | OUTPATIENT
Start: 2023-12-16 | End: 2023-12-16

## 2023-12-16 RX ORDER — INSULIN LISPRO 100/ML
4 VIAL (ML) SUBCUTANEOUS
Refills: 0 | Status: COMPLETED | OUTPATIENT
Start: 2023-12-16 | End: 2023-12-16

## 2023-12-16 RX ORDER — POTASSIUM PHOSPHATE, MONOBASIC POTASSIUM PHOSPHATE, DIBASIC 236; 224 MG/ML; MG/ML
30 INJECTION, SOLUTION INTRAVENOUS ONCE
Refills: 0 | Status: COMPLETED | OUTPATIENT
Start: 2023-12-16 | End: 2023-12-16

## 2023-12-16 RX ORDER — POTASSIUM CHLORIDE 20 MEQ
40 PACKET (EA) ORAL ONCE
Refills: 0 | Status: COMPLETED | OUTPATIENT
Start: 2023-12-16 | End: 2023-12-16

## 2023-12-16 RX ORDER — INSULIN GLARGINE 100 [IU]/ML
3 INJECTION, SOLUTION SUBCUTANEOUS ONCE
Refills: 0 | Status: COMPLETED | OUTPATIENT
Start: 2023-12-16 | End: 2023-12-16

## 2023-12-16 RX ORDER — HUMAN INSULIN 100 [IU]/ML
10 INJECTION, SUSPENSION SUBCUTANEOUS ONCE
Refills: 0 | Status: COMPLETED | OUTPATIENT
Start: 2023-12-16 | End: 2023-12-16

## 2023-12-16 RX ORDER — INFLUENZA VIRUS VACCINE 15; 15; 15; 15 UG/.5ML; UG/.5ML; UG/.5ML; UG/.5ML
0.5 SUSPENSION INTRAMUSCULAR ONCE
Refills: 0 | Status: DISCONTINUED | OUTPATIENT
Start: 2023-12-16 | End: 2023-12-17

## 2023-12-16 RX ORDER — INSULIN GLARGINE 100 [IU]/ML
12 INJECTION, SOLUTION SUBCUTANEOUS ONCE
Refills: 0 | Status: DISCONTINUED | OUTPATIENT
Start: 2023-12-17 | End: 2023-12-17

## 2023-12-16 RX ORDER — INSULIN LISPRO 100/ML
5 VIAL (ML) SUBCUTANEOUS
Refills: 0 | Status: DISCONTINUED | OUTPATIENT
Start: 2023-12-16 | End: 2023-12-17

## 2023-12-16 RX ORDER — INSULIN LISPRO 100/ML
VIAL (ML) SUBCUTANEOUS EVERY 4 HOURS
Refills: 0 | Status: DISCONTINUED | OUTPATIENT
Start: 2023-12-16 | End: 2023-12-17

## 2023-12-16 RX ADMIN — ONDANSETRON 4 MILLIGRAM(S): 8 TABLET, FILM COATED ORAL at 22:44

## 2023-12-16 RX ADMIN — ONDANSETRON 4 MILLIGRAM(S): 8 TABLET, FILM COATED ORAL at 12:49

## 2023-12-16 RX ADMIN — INSULIN GLARGINE 9 UNIT(S): 100 INJECTION, SOLUTION SUBCUTANEOUS at 15:22

## 2023-12-16 RX ADMIN — INSULIN GLARGINE 3 UNIT(S): 100 INJECTION, SOLUTION SUBCUTANEOUS at 10:46

## 2023-12-16 RX ADMIN — Medication 6: at 21:52

## 2023-12-16 RX ADMIN — ENOXAPARIN SODIUM 40 MILLIGRAM(S): 100 INJECTION SUBCUTANEOUS at 06:11

## 2023-12-16 RX ADMIN — Medication 100 MILLIEQUIVALENT(S): at 02:33

## 2023-12-16 RX ADMIN — POTASSIUM PHOSPHATE, MONOBASIC POTASSIUM PHOSPHATE, DIBASIC 83.33 MILLIMOLE(S): 236; 224 INJECTION, SOLUTION INTRAVENOUS at 18:49

## 2023-12-16 RX ADMIN — Medication 40 MILLIEQUIVALENT(S): at 18:06

## 2023-12-16 RX ADMIN — Medication 4: at 17:02

## 2023-12-16 RX ADMIN — Medication 5 UNIT(S): at 15:30

## 2023-12-16 RX ADMIN — HUMAN INSULIN 10 UNIT(S): 100 INJECTION, SUSPENSION SUBCUTANEOUS at 09:03

## 2023-12-16 RX ADMIN — Medication 4 UNIT(S): at 10:47

## 2023-12-16 RX ADMIN — INSULIN HUMAN 3: 100 INJECTION, SOLUTION SUBCUTANEOUS at 21:49

## 2023-12-16 RX ADMIN — Medication 40 MILLIEQUIVALENT(S): at 08:26

## 2023-12-16 RX ADMIN — Medication 1000 UNIT(S): at 17:11

## 2023-12-16 RX ADMIN — Medication 2: at 05:19

## 2023-12-16 RX ADMIN — Medication 62.5 MILLIMOLE(S): at 04:52

## 2023-12-16 RX ADMIN — Medication 1 PACKET(S): at 01:13

## 2023-12-16 RX ADMIN — ESCITALOPRAM OXALATE 10 MILLIGRAM(S): 10 TABLET, FILM COATED ORAL at 11:31

## 2023-12-16 RX ADMIN — Medication 100 MILLIEQUIVALENT(S): at 00:34

## 2023-12-16 RX ADMIN — Medication 8: at 01:23

## 2023-12-16 RX ADMIN — Medication 8: at 13:14

## 2023-12-16 NOTE — PROGRESS NOTE ADULT - ATTENDING COMMENTS
Anion gap closed this Am. Appreciate f/u endo recs.
Please see the comment on the consult follow up note.  Please f/u with endocrinology this morning>
22 yo F with history of IDDM, depression, anxiety, presented with nausea and vomiting found to have +RSV and DKA with severe acidosis with pH 6.9, admitted to MICU now with gap closed and improvement in FSG stable for step down    #DKA  #Poorly controlled DM  - patient reports stopping insulin as she was overwhelmed  - Endo following, appreciate recs, home regimen is 3 units of Tresiba and novolog premeal  - Lantus 8 last night, will continue basal bolus insulin  - tolerating PO intake    #Hypocalcemia  - due to Vit D deficiency (low 25-OH, high PTH)  - start vit D supplementation    #RSV  - asymptomatic at this time, symptom management as needed    #Depression  - resume home Lexapro 10mg    Remainder as above
Upgrade back to telemetry/ICU due to worsening anion gap metabolic acidosis. Agree with history, physical, assessment and plan as noted by Dr. Matthews.

## 2023-12-16 NOTE — CHART NOTE - NSCHARTNOTEFT_GEN_A_CORE
As per discussion with Endocrine team this AM, Dr. Up via telephone, preliminary recommendations for insulin regimen as follows iso step up from [facility] overnight and 10U NPH given at 9AM today.       Iso 10U NPH given at 9AM which will peak around 12noon on 12/16, first dose premeal lispro slightly lowered.   Lispro 4U with lunch on 12/16.  Lispro 5TID with meals afterwards.     Lantus 3U at 10AM  Lantus 4U at 4PM  Lantus 12U on 12/17 at 6PM  Lantus 12 U on 12/18 at normal bedtime time slot.     Full endocrine note to follow.

## 2023-12-16 NOTE — PROGRESS NOTE ADULT - PROBLEM SELECTOR PROBLEM 1
DM (diabetes mellitus), type 1
Diabetic ketoacidosis
DM (diabetes mellitus), type 1
Diabetic ketoacidosis
DM (diabetes mellitus), type 1

## 2023-12-16 NOTE — PROGRESS NOTE ADULT - PROBLEM SELECTOR PLAN 1
Pt has hx of Type I DM, diagnosed January 2022 at [facility] when she presented with DKA. Currently her 2nd admission for DKA, she was admitted 12/14, on admission, Glucose >400, Ag 27, pH 6.93, precipitant was likely RSV infection. She was on the insulin drip, subsequently anion gap closed x 2 and she was transitioned to Lantus.  FSG remained elevated throughout the day and repeat BMP showed CO2 10 and anion gap 21.    -s/p 40meq KCl x2 PO  -c w/ Kphos powder x2 q2  -c w/ KCl 10meq x3 IV  -q2 fingersticks  -repeat BMP q6-8hr  - basal/bolus regimen per endo Pt has hx of Type I DM, diagnosed January 2022 at [facility] when she presented with DKA. Currently her 2nd admission for DKA, she was admitted 12/14, on admission, Glucose >400, Ag 27, pH 6.93, precipitant was likely RSV infection. She was on the insulin drip, subsequently anion gap closed x 2 and she was transitioned to Lantus.  FSG remained elevated throughout the day and repeat BMP showed CO2 10 and anion gap 21.    -s/p 40meq KCl x2 PO  -c w/ Kphos powder x2 q2  -c w/ KCl 10meq x3 IV  -q2 fingersticks  -repeat BMP q6-8hr  - basal/bolus regimen per endo

## 2023-12-16 NOTE — PROGRESS NOTE ADULT - SUBJECTIVE AND OBJECTIVE BOX
O/N Events: Stepped up to tele    Subjective/ROS: Patient seen and examined at bedside. Resting comfortably, complaining of mild nausea. Denies vomiting, changes in bowel/urinary habits.     VITALS  Vital Signs Last 24 Hrs  T(C): 36.4 (16 Dec 2023 09:55), Max: 36.8 (16 Dec 2023 05:00)  T(F): 97.5 (16 Dec 2023 09:55), Max: 98.2 (16 Dec 2023 05:00)  HR: 86 (16 Dec 2023 17:00) (68 - 90)  BP: 116/75 (16 Dec 2023 17:00) (101/65 - 117/69)  BP(mean): 92 (16 Dec 2023 17:00) (79 - 92)  RR: 18 (16 Dec 2023 17:00) (18 - 18)  SpO2: 96% (16 Dec 2023 17:00) (96% - 100%)    Parameters below as of 16 Dec 2023 17:00  Patient On (Oxygen Delivery Method): room air        CAPILLARY BLOOD GLUCOSE      POCT Blood Glucose.: 203 mg/dL (16 Dec 2023 16:58)  POCT Blood Glucose.: 281 mg/dL (16 Dec 2023 15:05)  POCT Blood Glucose.: 329 mg/dL (16 Dec 2023 13:07)  POCT Blood Glucose.: 203 mg/dL (16 Dec 2023 10:43)  POCT Blood Glucose.: 148 mg/dL (16 Dec 2023 08:55)  POCT Blood Glucose.: 161 mg/dL (16 Dec 2023 05:58)  POCT Blood Glucose.: 157 mg/dL (16 Dec 2023 05:15)  POCT Blood Glucose.: 227 mg/dL (16 Dec 2023 03:05)  POCT Blood Glucose.: 303 mg/dL (16 Dec 2023 00:51)  POCT Blood Glucose.: 292 mg/dL (15 Dec 2023 20:41)  POCT Blood Glucose.: 385 mg/dL (15 Dec 2023 17:46)      PHYSICAL EXAM  General: NAD  Head: pupils reactive  Neck: Supple; no JVD  Respiratory: CTAB; no wheezes/rales/rhonchi  Cardiovascular: Regular rhythm/rate; S1/S2+, no murmurs, rubs gallops   Gastrointestinal: Soft; NTND; bowel sounds normal and present  Extremities: WWP; no edema/cyanosis  Neurological: A&Ox3, CNII-XII grossly intact; no obvious focal deficits    MEDICATIONS  (STANDING):  chlorhexidine 2% Cloths 1 Application(s) Topical <User Schedule>  cholecalciferol 1000 Unit(s) Oral every 24 hours  dextrose 5%. 1000 milliLiter(s) (50 mL/Hr) IV Continuous <Continuous>  dextrose 5%. 1000 milliLiter(s) (100 mL/Hr) IV Continuous <Continuous>  dextrose 50% Injectable 25 Gram(s) IV Push once  dextrose 50% Injectable 12.5 Gram(s) IV Push once  dextrose 50% Injectable 25 Gram(s) IV Push once  enoxaparin Injectable 40 milliGRAM(s) SubCutaneous every 24 hours  escitalopram 10 milliGRAM(s) Oral daily  glucagon  Injectable 1 milliGRAM(s) IntraMuscular once  influenza   Vaccine 0.5 milliLiter(s) IntraMuscular once  insulin lispro (ADMELOG) corrective regimen sliding scale   SubCutaneous every 4 hours  insulin lispro Injectable (ADMELOG) 5 Unit(s) SubCutaneous three times a day before meals  insulin regular  human corrective regimen sliding scale   SubCutaneous at bedtime    MEDICATIONS  (PRN):  dextrose Oral Gel 15 Gram(s) Oral once PRN Blood Glucose LESS THAN 70 milliGRAM(s)/deciliter  ondansetron Injectable 4 milliGRAM(s) IV Push every 8 hours PRN Nausea and/or Vomiting      No Known Allergies      LABS                        11.8   4.15  )-----------( 200      ( 16 Dec 2023 05:30 )             33.2     12-16    136  |  106  |  9   ----------------------------<  174<H>  3.5   |  16<L>  |  0.68    Ca    8.8      16 Dec 2023 05:30  Phos  2.8     12-16  Mg     2.0     12-16    TPro  5.8<L>  /  Alb  3.4  /  TBili  0.4  /  DBili  x   /  AST  14  /  ALT  7<L>  /  AlkPhos  128<H>  12-16      Urinalysis Basic - ( 16 Dec 2023 05:30 )    Color: x / Appearance: x / SG: x / pH: x  Gluc: 174 mg/dL / Ketone: x  / Bili: x / Urobili: x   Blood: x / Protein: x / Nitrite: x   Leuk Esterase: x / RBC: x / WBC x   Sq Epi: x / Non Sq Epi: x / Bacteria: x              IMAGING/EKG/ETC

## 2023-12-16 NOTE — PROGRESS NOTE ADULT - ASSESSMENT
24 y/o female with history of IDDM, depression, anxiety, presented with nausea and vomiting found to have +RSV and DKA and transferred to [facility] MICU for further management. 22 y/o female with history of IDDM, depression, anxiety, presented with nausea and vomiting found to have +RSV and DKA and transferred to [facility] MICU for further management.

## 2023-12-16 NOTE — PROGRESS NOTE ADULT - PROBLEM SELECTOR PLAN 6
F: None  E: replete K<4, Mg<2  N: consistent carbs   DVT ppx: None, IMPROVE 0  FULL CODE  Dispo: TELE

## 2023-12-17 VITALS — TEMPERATURE: 97 F

## 2023-12-17 LAB
ANION GAP SERPL CALC-SCNC: 9 MMOL/L — SIGNIFICANT CHANGE UP (ref 5–17)
ANION GAP SERPL CALC-SCNC: 9 MMOL/L — SIGNIFICANT CHANGE UP (ref 5–17)
ANISOCYTOSIS BLD QL: SLIGHT — SIGNIFICANT CHANGE UP
ANISOCYTOSIS BLD QL: SLIGHT — SIGNIFICANT CHANGE UP
BASOPHILS # BLD AUTO: 0.03 K/UL — SIGNIFICANT CHANGE UP (ref 0–0.2)
BASOPHILS # BLD AUTO: 0.03 K/UL — SIGNIFICANT CHANGE UP (ref 0–0.2)
BASOPHILS NFR BLD AUTO: 0.9 % — SIGNIFICANT CHANGE UP (ref 0–2)
BASOPHILS NFR BLD AUTO: 0.9 % — SIGNIFICANT CHANGE UP (ref 0–2)
BUN SERPL-MCNC: 15 MG/DL — SIGNIFICANT CHANGE UP (ref 7–23)
BUN SERPL-MCNC: 15 MG/DL — SIGNIFICANT CHANGE UP (ref 7–23)
CALCIUM SERPL-MCNC: 8.8 MG/DL — SIGNIFICANT CHANGE UP (ref 8.4–10.5)
CALCIUM SERPL-MCNC: 8.8 MG/DL — SIGNIFICANT CHANGE UP (ref 8.4–10.5)
CHLORIDE SERPL-SCNC: 104 MMOL/L — SIGNIFICANT CHANGE UP (ref 96–108)
CHLORIDE SERPL-SCNC: 104 MMOL/L — SIGNIFICANT CHANGE UP (ref 96–108)
CO2 SERPL-SCNC: 23 MMOL/L — SIGNIFICANT CHANGE UP (ref 22–31)
CO2 SERPL-SCNC: 23 MMOL/L — SIGNIFICANT CHANGE UP (ref 22–31)
CREAT SERPL-MCNC: 0.61 MG/DL — SIGNIFICANT CHANGE UP (ref 0.5–1.3)
CREAT SERPL-MCNC: 0.61 MG/DL — SIGNIFICANT CHANGE UP (ref 0.5–1.3)
EGFR: 129 ML/MIN/1.73M2 — SIGNIFICANT CHANGE UP
EGFR: 129 ML/MIN/1.73M2 — SIGNIFICANT CHANGE UP
EOSINOPHIL # BLD AUTO: 0 K/UL — SIGNIFICANT CHANGE UP (ref 0–0.5)
EOSINOPHIL # BLD AUTO: 0 K/UL — SIGNIFICANT CHANGE UP (ref 0–0.5)
EOSINOPHIL NFR BLD AUTO: 0 % — SIGNIFICANT CHANGE UP (ref 0–6)
EOSINOPHIL NFR BLD AUTO: 0 % — SIGNIFICANT CHANGE UP (ref 0–6)
GIANT PLATELETS BLD QL SMEAR: PRESENT — SIGNIFICANT CHANGE UP
GIANT PLATELETS BLD QL SMEAR: PRESENT — SIGNIFICANT CHANGE UP
GLUCOSE BLDC GLUCOMTR-MCNC: 111 MG/DL — HIGH (ref 70–99)
GLUCOSE BLDC GLUCOMTR-MCNC: 111 MG/DL — HIGH (ref 70–99)
GLUCOSE BLDC GLUCOMTR-MCNC: 115 MG/DL — HIGH (ref 70–99)
GLUCOSE BLDC GLUCOMTR-MCNC: 115 MG/DL — HIGH (ref 70–99)
GLUCOSE BLDC GLUCOMTR-MCNC: 161 MG/DL — HIGH (ref 70–99)
GLUCOSE BLDC GLUCOMTR-MCNC: 161 MG/DL — HIGH (ref 70–99)
GLUCOSE BLDC GLUCOMTR-MCNC: 215 MG/DL — HIGH (ref 70–99)
GLUCOSE BLDC GLUCOMTR-MCNC: 215 MG/DL — HIGH (ref 70–99)
GLUCOSE BLDC GLUCOMTR-MCNC: 286 MG/DL — HIGH (ref 70–99)
GLUCOSE BLDC GLUCOMTR-MCNC: 286 MG/DL — HIGH (ref 70–99)
GLUCOSE BLDC GLUCOMTR-MCNC: 289 MG/DL — HIGH (ref 70–99)
GLUCOSE BLDC GLUCOMTR-MCNC: 289 MG/DL — HIGH (ref 70–99)
GLUCOSE BLDC GLUCOMTR-MCNC: 95 MG/DL — SIGNIFICANT CHANGE UP (ref 70–99)
GLUCOSE BLDC GLUCOMTR-MCNC: 95 MG/DL — SIGNIFICANT CHANGE UP (ref 70–99)
GLUCOSE SERPL-MCNC: 111 MG/DL — HIGH (ref 70–99)
GLUCOSE SERPL-MCNC: 111 MG/DL — HIGH (ref 70–99)
HCT VFR BLD CALC: 30.7 % — LOW (ref 34.5–45)
HCT VFR BLD CALC: 30.7 % — LOW (ref 34.5–45)
HGB BLD-MCNC: 10.7 G/DL — LOW (ref 11.5–15.5)
HGB BLD-MCNC: 10.7 G/DL — LOW (ref 11.5–15.5)
HYPOCHROMIA BLD QL: SLIGHT — SIGNIFICANT CHANGE UP
HYPOCHROMIA BLD QL: SLIGHT — SIGNIFICANT CHANGE UP
LYMPHOCYTES # BLD AUTO: 1.17 K/UL — SIGNIFICANT CHANGE UP (ref 1–3.3)
LYMPHOCYTES # BLD AUTO: 1.17 K/UL — SIGNIFICANT CHANGE UP (ref 1–3.3)
LYMPHOCYTES # BLD AUTO: 31.3 % — SIGNIFICANT CHANGE UP (ref 13–44)
LYMPHOCYTES # BLD AUTO: 31.3 % — SIGNIFICANT CHANGE UP (ref 13–44)
MAGNESIUM SERPL-MCNC: 1.9 MG/DL — SIGNIFICANT CHANGE UP (ref 1.6–2.6)
MAGNESIUM SERPL-MCNC: 1.9 MG/DL — SIGNIFICANT CHANGE UP (ref 1.6–2.6)
MANUAL SMEAR VERIFICATION: SIGNIFICANT CHANGE UP
MANUAL SMEAR VERIFICATION: SIGNIFICANT CHANGE UP
MCHC RBC-ENTMCNC: 27.9 PG — SIGNIFICANT CHANGE UP (ref 27–34)
MCHC RBC-ENTMCNC: 27.9 PG — SIGNIFICANT CHANGE UP (ref 27–34)
MCHC RBC-ENTMCNC: 34.9 GM/DL — SIGNIFICANT CHANGE UP (ref 32–36)
MCHC RBC-ENTMCNC: 34.9 GM/DL — SIGNIFICANT CHANGE UP (ref 32–36)
MCV RBC AUTO: 80.2 FL — SIGNIFICANT CHANGE UP (ref 80–100)
MCV RBC AUTO: 80.2 FL — SIGNIFICANT CHANGE UP (ref 80–100)
MICROCYTES BLD QL: SLIGHT — SIGNIFICANT CHANGE UP
MICROCYTES BLD QL: SLIGHT — SIGNIFICANT CHANGE UP
MONOCYTES # BLD AUTO: 0.26 K/UL — SIGNIFICANT CHANGE UP (ref 0–0.9)
MONOCYTES # BLD AUTO: 0.26 K/UL — SIGNIFICANT CHANGE UP (ref 0–0.9)
MONOCYTES NFR BLD AUTO: 7 % — SIGNIFICANT CHANGE UP (ref 2–14)
MONOCYTES NFR BLD AUTO: 7 % — SIGNIFICANT CHANGE UP (ref 2–14)
MYELOCYTES NFR BLD: 1.7 % — HIGH (ref 0–0)
MYELOCYTES NFR BLD: 1.7 % — HIGH (ref 0–0)
NEUTROPHILS # BLD AUTO: 2.2 K/UL — SIGNIFICANT CHANGE UP (ref 1.8–7.4)
NEUTROPHILS # BLD AUTO: 2.2 K/UL — SIGNIFICANT CHANGE UP (ref 1.8–7.4)
NEUTROPHILS NFR BLD AUTO: 59.1 % — SIGNIFICANT CHANGE UP (ref 43–77)
NEUTROPHILS NFR BLD AUTO: 59.1 % — SIGNIFICANT CHANGE UP (ref 43–77)
OVALOCYTES BLD QL SMEAR: SLIGHT — SIGNIFICANT CHANGE UP
OVALOCYTES BLD QL SMEAR: SLIGHT — SIGNIFICANT CHANGE UP
PHOSPHATE SERPL-MCNC: 2.7 MG/DL — SIGNIFICANT CHANGE UP (ref 2.5–4.5)
PHOSPHATE SERPL-MCNC: 2.7 MG/DL — SIGNIFICANT CHANGE UP (ref 2.5–4.5)
PLAT MORPH BLD: ABNORMAL
PLAT MORPH BLD: ABNORMAL
PLATELET # BLD AUTO: 223 K/UL — SIGNIFICANT CHANGE UP (ref 150–400)
PLATELET # BLD AUTO: 223 K/UL — SIGNIFICANT CHANGE UP (ref 150–400)
POIKILOCYTOSIS BLD QL AUTO: SLIGHT — SIGNIFICANT CHANGE UP
POIKILOCYTOSIS BLD QL AUTO: SLIGHT — SIGNIFICANT CHANGE UP
POTASSIUM SERPL-MCNC: 3.6 MMOL/L — SIGNIFICANT CHANGE UP (ref 3.5–5.3)
POTASSIUM SERPL-MCNC: 3.6 MMOL/L — SIGNIFICANT CHANGE UP (ref 3.5–5.3)
POTASSIUM SERPL-SCNC: 3.6 MMOL/L — SIGNIFICANT CHANGE UP (ref 3.5–5.3)
POTASSIUM SERPL-SCNC: 3.6 MMOL/L — SIGNIFICANT CHANGE UP (ref 3.5–5.3)
RBC # BLD: 3.83 M/UL — SIGNIFICANT CHANGE UP (ref 3.8–5.2)
RBC # BLD: 3.83 M/UL — SIGNIFICANT CHANGE UP (ref 3.8–5.2)
RBC # FLD: 13.2 % — SIGNIFICANT CHANGE UP (ref 10.3–14.5)
RBC # FLD: 13.2 % — SIGNIFICANT CHANGE UP (ref 10.3–14.5)
RBC BLD AUTO: ABNORMAL
RBC BLD AUTO: ABNORMAL
SODIUM SERPL-SCNC: 136 MMOL/L — SIGNIFICANT CHANGE UP (ref 135–145)
SODIUM SERPL-SCNC: 136 MMOL/L — SIGNIFICANT CHANGE UP (ref 135–145)
WBC # BLD: 3.73 K/UL — LOW (ref 3.8–10.5)
WBC # BLD: 3.73 K/UL — LOW (ref 3.8–10.5)
WBC # FLD AUTO: 3.73 K/UL — LOW (ref 3.8–10.5)
WBC # FLD AUTO: 3.73 K/UL — LOW (ref 3.8–10.5)

## 2023-12-17 PROCEDURE — 85610 PROTHROMBIN TIME: CPT

## 2023-12-17 PROCEDURE — 85025 COMPLETE CBC W/AUTO DIFF WBC: CPT

## 2023-12-17 PROCEDURE — 85730 THROMBOPLASTIN TIME PARTIAL: CPT

## 2023-12-17 PROCEDURE — 82652 VIT D 1 25-DIHYDROXY: CPT

## 2023-12-17 PROCEDURE — 71045 X-RAY EXAM CHEST 1 VIEW: CPT

## 2023-12-17 PROCEDURE — 87880 STREP A ASSAY W/OPTIC: CPT

## 2023-12-17 PROCEDURE — 82803 BLOOD GASES ANY COMBINATION: CPT

## 2023-12-17 PROCEDURE — 82310 ASSAY OF CALCIUM: CPT

## 2023-12-17 PROCEDURE — 82962 GLUCOSE BLOOD TEST: CPT

## 2023-12-17 PROCEDURE — 83036 HEMOGLOBIN GLYCOSYLATED A1C: CPT

## 2023-12-17 PROCEDURE — 80053 COMPREHEN METABOLIC PANEL: CPT

## 2023-12-17 PROCEDURE — 81025 URINE PREGNANCY TEST: CPT

## 2023-12-17 PROCEDURE — 83970 ASSAY OF PARATHORMONE: CPT

## 2023-12-17 PROCEDURE — 36415 COLL VENOUS BLD VENIPUNCTURE: CPT

## 2023-12-17 PROCEDURE — 96374 THER/PROPH/DIAG INJ IV PUSH: CPT

## 2023-12-17 PROCEDURE — 84100 ASSAY OF PHOSPHORUS: CPT

## 2023-12-17 PROCEDURE — 96375 TX/PRO/DX INJ NEW DRUG ADDON: CPT

## 2023-12-17 PROCEDURE — 86900 BLOOD TYPING SEROLOGIC ABO: CPT

## 2023-12-17 PROCEDURE — 99285 EMERGENCY DEPT VISIT HI MDM: CPT | Mod: 25

## 2023-12-17 PROCEDURE — 82010 KETONE BODYS QUAN: CPT

## 2023-12-17 PROCEDURE — 80048 BASIC METABOLIC PNL TOTAL CA: CPT

## 2023-12-17 PROCEDURE — 86140 C-REACTIVE PROTEIN: CPT

## 2023-12-17 PROCEDURE — 82306 VITAMIN D 25 HYDROXY: CPT

## 2023-12-17 PROCEDURE — 83605 ASSAY OF LACTIC ACID: CPT

## 2023-12-17 PROCEDURE — 84681 ASSAY OF C-PEPTIDE: CPT

## 2023-12-17 PROCEDURE — 81001 URINALYSIS AUTO W/SCOPE: CPT

## 2023-12-17 PROCEDURE — 86850 RBC ANTIBODY SCREEN: CPT

## 2023-12-17 PROCEDURE — 86901 BLOOD TYPING SEROLOGIC RH(D): CPT

## 2023-12-17 PROCEDURE — 87081 CULTURE SCREEN ONLY: CPT

## 2023-12-17 PROCEDURE — 87637 SARSCOV2&INF A&B&RSV AMP PRB: CPT

## 2023-12-17 PROCEDURE — 83735 ASSAY OF MAGNESIUM: CPT

## 2023-12-17 RX ORDER — INSULIN GLARGINE 100 [IU]/ML
12 INJECTION, SOLUTION SUBCUTANEOUS
Qty: 1 | Refills: 0
Start: 2023-12-17 | End: 2024-01-15

## 2023-12-17 RX ORDER — POTASSIUM CHLORIDE 20 MEQ
40 PACKET (EA) ORAL ONCE
Refills: 0 | Status: COMPLETED | OUTPATIENT
Start: 2023-12-17 | End: 2023-12-17

## 2023-12-17 RX ORDER — INSULIN LISPRO 100/ML
5 VIAL (ML) SUBCUTANEOUS
Qty: 1 | Refills: 0
Start: 2023-12-17

## 2023-12-17 RX ORDER — INSULIN LISPRO 100/ML
5 VIAL (ML) SUBCUTANEOUS
Qty: 0 | Refills: 0 | DISCHARGE
Start: 2023-12-17

## 2023-12-17 RX ORDER — CHOLECALCIFEROL (VITAMIN D3) 125 MCG
2000 CAPSULE ORAL
Qty: 30 | Refills: 3
Start: 2023-12-17 | End: 2024-04-14

## 2023-12-17 RX ORDER — INSULIN GLARGINE 100 [IU]/ML
12 INJECTION, SOLUTION SUBCUTANEOUS
Qty: 0 | Refills: 0 | DISCHARGE
Start: 2023-12-17

## 2023-12-17 RX ORDER — ESCITALOPRAM OXALATE 10 MG/1
1 TABLET, FILM COATED ORAL
Qty: 0 | Refills: 0 | DISCHARGE
Start: 2023-12-17

## 2023-12-17 RX ORDER — INSULIN GLARGINE 100 [IU]/ML
12 INJECTION, SOLUTION SUBCUTANEOUS
Qty: 3 | Refills: 0
Start: 2023-12-17

## 2023-12-17 RX ORDER — CHOLECALCIFEROL (VITAMIN D3) 125 MCG
2000 CAPSULE ORAL EVERY 24 HOURS
Refills: 0 | Status: DISCONTINUED | OUTPATIENT
Start: 2023-12-17 | End: 2023-12-17

## 2023-12-17 RX ADMIN — Medication 2000 UNIT(S): at 14:28

## 2023-12-17 RX ADMIN — Medication 5 UNIT(S): at 12:29

## 2023-12-17 RX ADMIN — Medication 5 UNIT(S): at 07:24

## 2023-12-17 RX ADMIN — Medication 6: at 14:37

## 2023-12-17 RX ADMIN — Medication 40 MILLIEQUIVALENT(S): at 10:02

## 2023-12-17 RX ADMIN — Medication 6: at 10:02

## 2023-12-17 RX ADMIN — ESCITALOPRAM OXALATE 10 MILLIGRAM(S): 10 TABLET, FILM COATED ORAL at 12:13

## 2023-12-17 NOTE — DISCHARGE NOTE NURSING/CASE MANAGEMENT/SOCIAL WORK - PATIENT PORTAL LINK FT
You can access the FollowMyHealth Patient Portal offered by [facility] by registering at the following website: [email or web address]. By joining [employer]’s FollowMyHealth portal, you will also be able to view your health information using other applications (apps) compatible with our system. You can access the FollowMyHealth Patient Portal offered by [facility] by registering at the following website: [email or web address]. By joining [employer]’s FollowMyHealth portal, you will also be able to view your health information using other applications (apps) compatible with our system.

## 2023-12-17 NOTE — DISCHARGE NOTE NURSING/CASE MANAGEMENT/SOCIAL WORK - NSDCPEFALRISK_GEN_ALL_CORE
For information on Fall & Injury Prevention, visit: [email or web address] OR  [email or web address] OR  https://www.cdc.gov/steadi/patient.html For information on Fall & Injury Prevention, visit: [email or web address] OR  [email or web address] OR  https://www.cdc.gov/steadi/patient.html

## 2023-12-17 NOTE — PROGRESS NOTE ADULT - PROVIDER SPECIALTY LIST ADULT
Internal Medicine
Internal Medicine
MICU
MICU
Internal Medicine
Internal Medicine
Endocrinology
Internal Medicine

## 2023-12-17 NOTE — DISCHARGE NOTE PROVIDER - NSDCCPCAREPLAN_GEN_ALL_CORE_FT
PRINCIPAL DISCHARGE DIAGNOSIS  Diagnosis: Diabetic ketoacidosis  Assessment and Plan of Treatment: Please follow up with your endocrinologist and PCP.   Treatment:   1. Lantus 12 at bedtime  2. Lispro 5 premeals   Advised to aim for 30 gm of carbs with meals.  Diabetic ketoacidosis (DKA) is a life-threatening problem that affects people with diabetes. It occurs when the body starts breaking down fat at a rate that is much too fast. The liver processes the fat into a fuel called ketones, which causes the blood to become acidic. We suspect you most likely went into DKA in the settting of RSV illness.   DKA happens when the signal from insulin in the body is so low that: Glucose (blood sugar) can't go into cells to be used as a fuel source; The liver makes a huge amount of blood sugar; Fat is broken down too rapidly for the body to process.  Common symptoms of DKA can include: Decreased alertness  Deep, rapid breathing, Dry skin and mouth, Flushed face, Frequent urination or thirst that lasts for a day or more, Fruity-smelling breath, Headache, Muscle stiffness or aches, Nausea and vomiting, Stomach pain  If you experience any of these symptoms please check urine ketones and seek medical help immediately      SECONDARY DISCHARGE DIAGNOSES  Diagnosis: Hypocalcemia  Assessment and Plan of Treatment: Calcium at 7.6 Intact PTH elevated to 82. Vit D 22.1, likely vitamin D deficiency  Treatment  1. Cholecalciferol 2000units.  Please follow up with PCP       Diagnosis: RSV infection  Assessment and Plan of Treatment: Respiratory Syncytial Virus Infection, Adult  Respiratory syncytial virus (RSV) infection is an infection caused by RSV, a common virus. This virus is similar to viruses that cause the common cold and the flu. RSV infection can affect the nose, throat, windpipe, and lungs (respiratory system). When the infection is severe, it can cause:  •Bronchiolitis. This condition causes inflammation of the air passages in the lungs (bronchioles).  •Pneumonia. This condition causes inflammation of the air sacs in the lungs  RSV infection spreads from person to person (is contagious) through droplets from coughs and sneezes (respiratory secretions). This condition is rarely serious when it occurs in adults.  What are the causes?  This condition is caused by contact with RSV. This can happen by:  •Breathing respiratory secretions from someone who has the infection.  •Touching something that has been exposed to the virus (is contaminated) and then touching your mouth, nose, or eyes.  •Coming in close contact with someone who has this infection. This may happen if you:  •Hug or kiss.  •Shake or hold hands.  •Eat or drink using the same dishes or utensils.  What increases the risk?  The following factors may make you more likely to develop this condition:  •Being 65 years of age or older.  •Having certain health conditions, including:  •A long-term (chronic) lung condition, such as chronic obstructive pulmonary disease (COPD)  •An immune system that is weak. This is your body's defense system.  •Down syndrome.  •Heart disease.  •Working in a hospital or other health care facility.  •Living in a long-term health care facility  RSV infections are most common from the months of November to April, but they can happen any time of year  What are the signs or symptoms?  Symptoms of this condition include:  •Having a runny nose  •Coughing. You may have a cough that brings up mucus (productive cough).  •Sneezing.  •Having a fever.  •Wanting to eat less than usual.  •Breathing loudly (wheezing).  •Having shortness of breath.     PRINCIPAL DISCHARGE DIAGNOSIS  Diagnosis: Diabetic ketoacidosis  Assessment and Plan of Treatment: Please follow up with your endocrinologist and PCP.   Treatment:   1. Lantus 12 at bedtime  2. Lispro 5 premeals   Advised to aim for 30 gm of carbs with meals.  ***Please  medication at:   [address] [place] [phone]  Diabetic ketoacidosis (DKA) is a life-threatening problem that affects people with diabetes. It occurs when the body starts breaking down fat at a rate that is much too fast. The liver processes the fat into a fuel called ketones, which causes the blood to become acidic. We suspect you most likely went into DKA in the settting of RSV illness.   DKA happens when the signal from insulin in the body is so low that: Glucose (blood sugar) can't go into cells to be used as a fuel source; The liver makes a huge amount of blood sugar; Fat is broken down too rapidly for the body to process.  Common symptoms of DKA can include: Decreased alertness  Deep, rapid breathing, Dry skin and mouth, Flushed face, Frequent urination or thirst that lasts for a day or more, Fruity-smelling breath, Headache, Muscle stiffness or aches, Nausea and vomiting, Stomach pain  If you experience any of these symptoms please check urine ketones and seek medical help immediately      SECONDARY DISCHARGE DIAGNOSES  Diagnosis: Hypocalcemia  Assessment and Plan of Treatment: Calcium at 7.6 Intact PTH elevated to 82. Vit D 22.1, likely vitamin D deficiency  Treatment  1. Cholecalciferol 2000units.  Please follow up with PCP       Diagnosis: RSV infection  Assessment and Plan of Treatment: Respiratory Syncytial Virus Infection, Adult  Respiratory syncytial virus (RSV) infection is an infection caused by RSV, a common virus. This virus is similar to viruses that cause the common cold and the flu. RSV infection can affect the nose, throat, windpipe, and lungs (respiratory system). When the infection is severe, it can cause:  •Bronchiolitis. This condition causes inflammation of the air passages in the lungs (bronchioles).  •Pneumonia. This condition causes inflammation of the air sacs in the lungs  RSV infection spreads from person to person (is contagious) through droplets from coughs and sneezes (respiratory secretions). This condition is rarely serious when it occurs in adults.  What are the causes?  This condition is caused by contact with RSV. This can happen by:  •Breathing respiratory secretions from someone who has the infection.  •Touching something that has been exposed to the virus (is contaminated) and then touching your mouth, nose, or eyes.  •Coming in close contact with someone who has this infection. This may happen if you:  •Hug or kiss.  •Shake or hold hands.  •Eat or drink using the same dishes or utensils.  What increases the risk?  The following factors may make you more likely to develop this condition:  •Being 65 years of age or older.  •Having certain health conditions, including:  •A long-term (chronic) lung condition, such as chronic obstructive pulmonary disease (COPD)  •An immune system that is weak. This is your body's defense system.  •Down syndrome.  •Heart disease.  •Working in a hospital or other health care facility.  •Living in a long-term health care facility  RSV infections are most common from the months of November to April, but they can happen any time of year  What are the signs or symptoms?  Symptoms of this condition include:  •Having a runny nose  •Coughing. You may have a cough that brings up mucus (productive cough).  •Sneezing.  •Having a fever.  •Wanting to eat less than usual.  •Breathing loudly (wheezing).  •Having shortness of breath.     PRINCIPAL DISCHARGE DIAGNOSIS  Diagnosis: Diabetic ketoacidosis  Assessment and Plan of Treatment: Please follow up with your endocrinologist and PCP.   Treatment:   1. Lantus 12 at bedtime  2. Lispro 5 premeals   Advised to aim for 30 gm of carbs with meals.  ***Please  medication at:   [address] [place] [phone]  Diabetic ketoacidosis (DKA) is a life-threatening problem that affects people with diabetes. It occurs when the body starts breaking down fat at a rate that is much too fast. The liver processes the fat into a fuel called ketones, which causes the blood to become acidic. We suspect you most likely went into DKA in the settting of RSV illness.   DKA happens when the signal from insulin in the body is so low that: Glucose (blood sugar) can't go into cells to be used as a fuel source; The liver makes a huge amount of blood sugar; Fat is broken down too rapidly for the body to process.  Common symptoms of DKA can include: Decreased alertness  Deep, rapid breathing, Dry skin and mouth, Flushed face, Frequent urination or thirst that lasts for a day or more, Fruity-smelling breath, Headache, Muscle stiffness or aches, Nausea and vomiting, Stomach pain  If you experience any of these symptoms please check urine ketones and seek medical help immediately      SECONDARY DISCHARGE DIAGNOSES  Diagnosis: Hypocalcemia  Assessment and Plan of Treatment: Calcium at 7.6 Intact PTH elevated to 82. Vit D 22.1, likely vitamin D deficiency  Treatment  1. Cholecalciferol 2000units.  Please follow up with PCP       Diagnosis: RSV infection  Assessment and Plan of Treatment: Respiratory Syncytial Virus Infection, Adult  Respiratory syncytial virus (RSV) infection is an infection caused by RSV, a common virus. This virus is similar to viruses that cause the common cold and the flu. RSV infection can affect the nose, throat, windpipe, and lungs (respiratory system). When the infection is severe, it can cause:  •Bronchiolitis. This condition causes inflammation of the air passages in the lungs (bronchioles).  •Pneumonia. This condition causes inflammation of the air sacs in the lungs  RSV infection spreads from person to person (is contagious) through droplets from coughs and sneezes (respiratory secretions). This condition is rarely serious when it occurs in adults.  What are the causes?  This condition is caused by contact with RSV. This can happen by:  •Breathing respiratory secretions from someone who has the infection.  •Touching something that has been exposed to the virus (is contaminated) and then touching your mouth, nose, or eyes.  •Coming in close contact with someone who has this infection. This may happen if you:  •Hug or kiss.  •Shake or hold hands.  •Eat or drink using the same dishes or utensils.  What increases the risk?  The following factors may make you more likely to develop this condition:  •Being 65 years of age or older.  •Having certain health conditions, including:  •A long-term (chronic) lung condition, such as chronic obstructive pulmonary disease (COPD)  •An immune system that is weak. This is your body's defense system.  •Down syndrome.  •Heart disease.  •Working in a hospital or other health care facility.  •Living in a long-term health care facility  RSV infections are most common from the months of November to April, but they can happen any time of year  What are the signs or symptoms?  Symptoms of this condition include:  •Having a runny nose  •Coughing. You may have a cough that brings up mucus (productive cough).  •Sneezing.  •Having a fever.  •Wanting to eat less than usual.  •Breathing loudly (wheezing).  •Having shortness of breath.

## 2023-12-17 NOTE — DISCHARGE NOTE PROVIDER - NSDCMRMEDTOKEN_GEN_ALL_CORE_FT
alcohol swabs : Apply topically to affected area 4 times a day   Basaglar KwikPen 100 units/mL subcutaneous solution: 12 unit(s) subcutaneous once a day   glucometer (per patient&#x27;s insurance): Test blood sugars four times a day. Dispense #1 glucometer.  Insulin Pen Needles, 4mm: 1 application subcutaneously 4 times a day. ** Use with insulin pen **   lancets: 1 application subcutaneously 4 times a day   Lantus Solostar Pen 100 units/mL subcutaneous solution: 12 unit(s) subcutaneous once a day (at bedtime)   NovoLOG FlexPen 100 units/mL injectable solution: 8 unit(s) subcutaneous 3 times a day (with meals)   test strips (per patient&#x27;s insurance): 1 application subcutaneously 4 times a day. ** Compatible with patient&#x27;s glucometer **   alcohol swabs : Apply topically to affected area 4 times a day   escitalopram 10 mg oral tablet: 1 tab(s) orally once a day  glucometer (per patient&#x27;s insurance): Test blood sugars four times a day. Dispense #1 glucometer.  insulin glargine 100 units/mL subcutaneous solution: 12 unit(s) subcutaneous once a day (at bedtime)  insulin lispro 100 units/mL injectable solution: 5 unit(s) injectable 3 times a day (before meals)  Insulin Pen Needles, 4mm: 1 application subcutaneously 4 times a day. ** Use with insulin pen **   lancets: 1 application subcutaneously 4 times a day   test strips (per patient&#x27;s insurance): 1 application subcutaneously 4 times a day. ** Compatible with patient&#x27;s glucometer **   alcohol swabs : Apply topically to affected area 4 times a day   escitalopram 10 mg oral tablet: 1 tab(s) orally once a day  glucometer (per patient&#x27;s insurance): Test blood sugars four times a day. Dispense #1 glucometer.  insulin glargine 100 units/mL subcutaneous solution: 12 unit(s) subcutaneous once a day (at bedtime)  insulin lispro 100 units/mL injectable solution: 5 unit(s) injectable 3 times a day (before meals)  Insulin Pen Needles, 4mm: 1 application subcutaneously 4 times a day. ** Use with insulin pen **   lancets: 1 application subcutaneously 4 times a day   Lantus Solostar Pen 100 units/mL subcutaneous solution: 12 unit(s) subcutaneous once a day (at bedtime)  test strips (per patient&#x27;s insurance): 1 application subcutaneously 4 times a day. ** Compatible with patient&#x27;s glucometer **

## 2023-12-17 NOTE — DISCHARGE NOTE PROVIDER - CARE PROVIDER_API CALL
[facility]   [facility]  Address: [address] [place] [address] [address], [place] [address]  Phone: [phone]  Phone: (   )    -  Fax: (   )    -  Established Patient  Follow Up Time: 2 weeks   [facility]   [facility]  Address: [address] [place] [address] [address], [place] [address]  Phone: [phone]  Phone: (   )    -  Fax: (   )    -  Established Patient  Follow Up Time: 2 weeks

## 2023-12-17 NOTE — DISCHARGE NOTE PROVIDER - PROVIDER TOKENS
FREE:[LAST:[[facility]],PHONE:[(   )    -],FAX:[(   )    -],ADDRESS:[[facility]  Address: [address] [place] [address] [address], [place] [id number]  Phone: [phone]],FOLLOWUP:[2 weeks],ESTABLISHEDPATIENT:[T]] FREE:[LAST:[[facility]],PHONE:[(   )    -],FAX:[(   )    -],ADDRESS:[[facility]  Address: [address] [place] [address] [address], [place] [id number]  Phone: [phone]],FOLLOWUP:[2 weeks],ESTABLISHEDPATIENT:[T]]

## 2023-12-17 NOTE — DISCHARGE NOTE PROVIDER - HOSPITAL COURSE
#Discharge: do not delete    Hospital Course:  24 y/o female with history of IDDM, depression, anxiety, presented with nausea and vomiting found to have +RSV and DKA (glucose on admission 470, urine ketones 160, urine glucose 1000)  and transferred to [facility] MICU for further management. In MICU, she was put on insulin drips, K was continuously repleted, recieved bicarbonate. Her anion gap closed the morning of 12/15. She was stepped down to [facility] on 12/5 but repeat labs showed her anion gap opened to 21, glucose 292, CO2 10. ICU was consulted. Pt stepped up to telemetry for continued monitoring. Pt insulin stable with 12U lantus at bedtime and 5U lispro prior to meals. Anion gap closed.      # Diabetic ketoacidosis.   ·  Plan: Pt has hx of Type I DM, diagnosed January 2022 at [facility] when she presented with DKA. Currently her 2nd admission for DKA, she was admitted 12/14, on admission, Glucose >400, Ag 27, pH 6.93, precipitant was likely RSV infection. She was on the insulin drip, subsequently anion gap closed x 2 and she was transitioned to Lantus.  FSG remained elevated throughout the day and repeat BMP showed CO2 10 and anion gap 21.    - 12 lantus, 5 lispro   - basal/bolus regimen per endo.  - f/u endocrinology outpt     #RSV infection.   ·  Plan: URI symptoms, found to be RSV+  - Supportive measures.    #Depression, major.   ·  Plan: F/u home dose of Lexapro 10 qD  - c/w home med     #Anemia.   ·  Plan: Hgb 9.8 MCV 76.4. Mentzer index 22.  f/u PCP         #Hypocalcemia.   ·  Plan: Ca at 7.6 Intact PTH elevated to 82. Vit d 22.1, likely vitamin D deficiency  - started cholecalciferol 2000units.      Patient was discharged to: (home/[facility]/acute rehab/hospice, etc, and with what services – home health PT/RN? Home O2?)  New medications:  Changes to old medications: 12 lantus, 5 lispro     Items to follow up as outpatient: Please follow up with your PCP and endocrinologist   Physical exam at the time of discharge:    PHYSICAL EXAM  General: NAD  Head: pupils reactive  Neck: Supple; no JVD  Respiratory: CTAB; no wheezes/rales/rhonchi  Cardiovascular: Regular rhythm/rate; S1/S2+, no murmurs, rubs gallops   Gastrointestinal: Soft; NTND; bowel sounds normal and present  Extremities: WWP; no edema/cyanosis  Neurological: A&Ox3, CNII-XII grossly intact; no obvious focal deficits #Discharge: do not delete    Hospital Course:  24 y/o female with history of IDDM, depression, anxiety, presented with nausea and vomiting found to have +RSV and DKA (glucose on admission 470, urine ketones 160, urine glucose 1000)  and transferred to [facility] MICU for further management. In MICU, she was put on insulin drips, K was continuously repleted, recieved bicarbonate. Her anion gap closed the morning of 12/15. She was stepped down to RMF on 12/5 but repeat labs showed her anion gap opened to 21, glucose 292, CO2 10. ICU was consulted. Pt stepped up to telemetry for continued monitoring. Pt insulin stable with 12U lantus at bedtime and 5U lispro prior to meals. Anion gap closed.      # Diabetic ketoacidosis.   ·  Plan: Pt has hx of Type I DM, diagnosed January 2022 at [facility] when she presented with DKA. Currently her 2nd admission for DKA, she was admitted 12/14, on admission, Glucose >400, Ag 27, pH 6.93, precipitant was likely RSV infection. She was on the insulin drip, subsequently anion gap closed x 2 and she was transitioned to Lantus.  FSG remained elevated throughout the day and repeat BMP showed CO2 10 and anion gap 21.    - 12 lantus, 5 lispro   - basal/bolus regimen per endo.  - f/u endocrinology outpt     #RSV infection.   ·  Plan: URI symptoms, found to be RSV+  - Supportive measures.    #Depression, major.   ·  Plan: F/u home dose of Lexapro 10 qD  - c/w home med     #Anemia.   ·  Plan: Hgb 9.8 MCV 76.4. Mentzer index 22.  f/u PCP         #Hypocalcemia.   ·  Plan: Ca at 7.6 Intact PTH elevated to 82. Vit d 22.1, likely vitamin D deficiency  - started cholecalciferol 2000units.      Patient was discharged to: (home/[facility]/acute rehab/hospice, etc, and with what services – home health PT/RN? Home O2?)  New medications:  Changes to old medications: 12 lantus, 5 lispro     Items to follow up as outpatient: Please follow up with your PCP and endocrinologist   Physical exam at the time of discharge:    PHYSICAL EXAM  General: NAD  Head: pupils reactive  Neck: Supple; no JVD  Respiratory: CTAB; no wheezes/rales/rhonchi  Cardiovascular: Regular rhythm/rate; S1/S2+, no murmurs, rubs gallops   Gastrointestinal: Soft; NTND; bowel sounds normal and present  Extremities: WWP; no edema/cyanosis  Neurological: A&Ox3, CNII-XII grossly intact; no obvious focal deficits

## 2023-12-23 PROBLEM — F32.9 MAJOR DEPRESSIVE DISORDER, SINGLE EPISODE, UNSPECIFIED: Chronic | Status: ACTIVE | Noted: 2023-12-15

## 2023-12-23 PROBLEM — E10.9 TYPE 1 DIABETES MELLITUS WITHOUT COMPLICATIONS: Chronic | Status: ACTIVE | Noted: 2023-12-13

## 2023-12-23 PROBLEM — B33.8 OTHER SPECIFIED VIRAL DISEASES: Chronic | Status: ACTIVE | Noted: 2023-12-15

## 2023-12-23 PROBLEM — E11.10 TYPE 2 DIABETES MELLITUS WITH KETOACIDOSIS WITHOUT COMA: Chronic | Status: ACTIVE | Noted: 2023-12-13

## 2023-12-23 PROBLEM — Z29.9 ENCOUNTER FOR PROPHYLACTIC MEASURES, UNSPECIFIED: Chronic | Status: ACTIVE | Noted: 2023-12-15

## 2024-05-16 NOTE — PATIENT PROFILE ADULT - NSPROPOAURINARYCATHETER_GEN_A_NUR
PHYSICAL THERAPY        Patient Name: Pj Vences  Today's Date: 5/16/2024 05/16/24 1457   PT Last Visit   PT Visit Date 05/16/24   Note Type   Note type Screen   Cancel Reasons Other       Order received and chart review performed. Per OT evaluation, pt is performing functional mobility independently with no device. Pt does not require skilled PT intervention at this time; will d/c PT orders.    Amarilys Centeno     no

## 2024-05-29 NOTE — PATIENT PROFILE ADULT - HAVE YOU RECENTLY LOST WEIGHT WITHOUT TRYING?
- increase lantus to 35 units qam  - change humalog to the following:  Humalog sliding scale with tube feeds with meals and at bedtime  70-90 10 units   14 units   131-150 15 units  151-200 16 units  201-250 17 units  251-300 18 units  301-350 19 units  >351 20 units  - call if glucose is out of target range which is 100-280 in this patient  
Yes...

## 2024-10-01 NOTE — PATIENT PROFILE ADULT - FALL HARM RISK - FALL HARM RISK
Education provided on the pain management plan of care/Side effects of pain management treatment/Activities of daily living, including home environment that might     exacerbate pain or reduce effectiveness of the pain management plan of care as well as strategies to address these issues/Safe use, storage and disposal of opioids when prescribed Coagulation

## 2025-02-15 NOTE — DISCHARGE NOTE NURSING/CASE MANAGEMENT/SOCIAL WORK - FLU SEASON?
Admission Blood cultures labeled same arm/same time are + for MSSA. 2/12/25 TTE negative for vegetation. No PPM, intravascular devices. Acute posterior neck pain predominant starting 2/14/25 and possible deep cervical infection evident on MRI C spine  -continues Cefazolin 2 g IV q 8 hours at present  -follow-up repeat blood cultures Friday  -serial exam  -additional inventions pending clinical course  -anticipate protracted IV antibiotic course      Yes...